# Patient Record
Sex: FEMALE | Race: WHITE | NOT HISPANIC OR LATINO | Employment: FULL TIME | URBAN - METROPOLITAN AREA
[De-identification: names, ages, dates, MRNs, and addresses within clinical notes are randomized per-mention and may not be internally consistent; named-entity substitution may affect disease eponyms.]

---

## 2017-05-22 DIAGNOSIS — E89.0 HYPOTHYROIDISM, POSTSURGICAL: ICD-10-CM

## 2017-05-22 NOTE — TELEPHONE ENCOUNTER
levothyroxine (SYNTHROID) 150 MCG tablet     Last Written Prescription Date: 4/1/2016  Last Quantity: 90, # refills: 3  Last Office Visit with [facility], [facility] or [facility] prescribing provider: 4/1/2016        TSH   Date Value Ref Range Status   02/18/2016 0.89 0.40 - 4.00 mU/L Final

## 2017-05-23 RX ORDER — LEVOTHYROXINE SODIUM 150 UG/1
TABLET ORAL
Qty: 30 TABLET | Refills: 0 | Status: SHIPPED | OUTPATIENT
Start: 2017-05-23 | End: 2017-06-22

## 2017-05-23 NOTE — TELEPHONE ENCOUNTER
Medication is being filled for 1 time refill only due to:  Patient needs labs TSH. Patient needs to be seen because it has been more than one year since last visit.     Lashell Lozano RN

## 2017-06-22 ENCOUNTER — OFFICE VISIT (OUTPATIENT)
Dept: PEDIATRICS | Facility: CLINIC | Age: 42
End: 2017-06-22
Payer: COMMERCIAL

## 2017-06-22 VITALS
HEART RATE: 66 BPM | OXYGEN SATURATION: 99 % | TEMPERATURE: 98.1 F | BODY MASS INDEX: 36.56 KG/M2 | HEIGHT: 66 IN | DIASTOLIC BLOOD PRESSURE: 68 MMHG | SYSTOLIC BLOOD PRESSURE: 102 MMHG | WEIGHT: 227.5 LBS

## 2017-06-22 DIAGNOSIS — Z01.419 ENCOUNTER FOR GYNECOLOGICAL EXAMINATION WITHOUT ABNORMAL FINDING: Primary | ICD-10-CM

## 2017-06-22 DIAGNOSIS — Z11.3 SCREEN FOR STD (SEXUALLY TRANSMITTED DISEASE): ICD-10-CM

## 2017-06-22 DIAGNOSIS — Z12.31 ENCOUNTER FOR SCREENING MAMMOGRAM FOR BREAST CANCER: ICD-10-CM

## 2017-06-22 DIAGNOSIS — E55.9 VITAMIN D DEFICIENCY: ICD-10-CM

## 2017-06-22 DIAGNOSIS — Z83.3 FAMILY HISTORY OF DIABETES MELLITUS: ICD-10-CM

## 2017-06-22 DIAGNOSIS — E89.0 HYPOTHYROIDISM, POSTSURGICAL: ICD-10-CM

## 2017-06-22 DIAGNOSIS — M54.50 CHRONIC BILATERAL LOW BACK PAIN WITHOUT SCIATICA: ICD-10-CM

## 2017-06-22 DIAGNOSIS — G47.00 INSOMNIA, UNSPECIFIED TYPE: ICD-10-CM

## 2017-06-22 DIAGNOSIS — G89.29 CHRONIC BILATERAL LOW BACK PAIN WITHOUT SCIATICA: ICD-10-CM

## 2017-06-22 LAB
HBA1C MFR BLD: 5 % (ref 4.3–6)
T4 FREE SERPL-MCNC: 1.62 NG/DL (ref 0.76–1.46)
TSH SERPL DL<=0.05 MIU/L-ACNC: 0.14 MU/L (ref 0.4–4)

## 2017-06-22 PROCEDURE — 82306 VITAMIN D 25 HYDROXY: CPT | Performed by: INTERNAL MEDICINE

## 2017-06-22 PROCEDURE — 84443 ASSAY THYROID STIM HORMONE: CPT | Performed by: INTERNAL MEDICINE

## 2017-06-22 PROCEDURE — 87491 CHLMYD TRACH DNA AMP PROBE: CPT | Performed by: INTERNAL MEDICINE

## 2017-06-22 PROCEDURE — 36415 COLL VENOUS BLD VENIPUNCTURE: CPT | Performed by: INTERNAL MEDICINE

## 2017-06-22 PROCEDURE — 99396 PREV VISIT EST AGE 40-64: CPT | Performed by: INTERNAL MEDICINE

## 2017-06-22 PROCEDURE — G0145 SCR C/V CYTO,THINLAYER,RESCR: HCPCS | Performed by: INTERNAL MEDICINE

## 2017-06-22 PROCEDURE — 83036 HEMOGLOBIN GLYCOSYLATED A1C: CPT | Performed by: INTERNAL MEDICINE

## 2017-06-22 PROCEDURE — 87591 N.GONORRHOEAE DNA AMP PROB: CPT | Performed by: INTERNAL MEDICINE

## 2017-06-22 PROCEDURE — 84439 ASSAY OF FREE THYROXINE: CPT | Performed by: INTERNAL MEDICINE

## 2017-06-22 PROCEDURE — 87624 HPV HI-RISK TYP POOLED RSLT: CPT | Performed by: INTERNAL MEDICINE

## 2017-06-22 PROCEDURE — 87389 HIV-1 AG W/HIV-1&-2 AB AG IA: CPT | Performed by: INTERNAL MEDICINE

## 2017-06-22 PROCEDURE — 86780 TREPONEMA PALLIDUM: CPT | Performed by: INTERNAL MEDICINE

## 2017-06-22 RX ORDER — LEVOTHYROXINE SODIUM 150 UG/1
150 TABLET ORAL DAILY
Qty: 30 TABLET | Refills: 0 | Status: SHIPPED | OUTPATIENT
Start: 2017-06-22 | End: 2017-06-23

## 2017-06-22 RX ORDER — TRAZODONE HYDROCHLORIDE 100 MG/1
100 TABLET ORAL
Qty: 30 TABLET | Refills: 3 | Status: SHIPPED | OUTPATIENT
Start: 2017-06-22 | End: 2018-09-07

## 2017-06-22 NOTE — NURSING NOTE
"Chief Complaint   Patient presents with     Physical       Initial /68 (BP Location: Right arm, Patient Position: Chair, Cuff Size: Adult Large)  Pulse 66  Temp 98.1  F (36.7  C) (Tympanic)  Ht 5' 5.5\" (1.664 m)  Wt 227 lb 8 oz (103.2 kg)  LMP 05/30/2017  SpO2 99%  BMI 37.28 kg/m2 Estimated body mass index is 37.28 kg/(m^2) as calculated from the following:    Height as of this encounter: 5' 5.5\" (1.664 m).    Weight as of this encounter: 227 lb 8 oz (103.2 kg).  Medication Reconciliation: complete   Luzmaria Paniagua CMA    "

## 2017-06-22 NOTE — PROGRESS NOTES
SUBJECTIVE:     CC: Kaleigh Nam is an 42 year old woman who presents for preventive health visit.     Physical   Annual:     Getting at least 3 servings of Calcium per day::  Yes    Bi-annual eye exam::  Yes    Dental care twice a year::  Yes    Sleep apnea or symptoms of sleep apnea::  None    Diet::  Regular (no restrictions)    Frequency of exercise::  4-5 days/week    Duration of exercise::  45-60 minutes    Taking medications regularly::  Yes    Medication side effects::  Not applicable    Additional concerns today::  YES (spotting after period, back stiffness,STD)    1) back stiffness, worse with getting out of bed in the am,  Has a hard time standing upright.  Has increased in past 6 months.  No injury.  Is working on core strengthening. No radiation of pan down legs, no change in bowel or bladder.    2)  had an affair, wants STD testing.   3) having some spotting intermittently between periods.  Spotting lasts 1-2 days.      Hypothyroidism Follow-up      Since last visit, patient describes the following symptoms: Weight stable, no hair loss, no skin changes, no constipation, no loose stools       Today's PHQ-2 Score:   PHQ-2 ( 1999 Pfizer) 6/22/2017   Q1: Little interest or pleasure in doing things 0   Q2: Feeling down, depressed or hopeless 0   PHQ-2 Score 0   Q1: Little interest or pleasure in doing things Not at all   Q2: Feeling down, depressed or hopeless Not at all   PHQ-2 Score 0       Abuse: Current or Past(Physical, Sexual or Emotional)- NO  Do you feel safe in your environment - YES    Social History   Substance Use Topics     Smoking status: Never Smoker     Smokeless tobacco: Never Used     Alcohol use 1.8 oz/week     3 Standard drinks or equivalent per week      Comment: 1 month     The patient does not drink >3 drinks per day nor >7 drinks per week.    Recent Labs   Lab Test  02/18/16   0954 05/31/12   CHOL  153  172   HDL  74  63   LDL  69  100   TRIG  52  44   NHDL  79   --         Reviewed orders with patient.  Reviewed health maintenance and updated orders accordingly - Yes    Mammo Decision Support:  Patient under age 50, mutual decision reflected in health maintenance.      Pertinent mammograms are reviewed under the imaging tab.  History of abnormal Pap smear: NO - age 30-65 PAP every 5 years with negative HPV co-testing recommended    Reviewed and updated as needed this visit by clinical staff  Tobacco  Allergies  Meds  Med Hx  Surg Hx  Fam Hx  Soc Hx        Reviewed and updated as needed this visit by Provider        Past Medical History:   Diagnosis Date     Allergic rhinitis, cause unspecified      Anxiety state, unspecified      Graves's Disease      Multinodular Goiter      Severe pre-eclampsia, unspecified as to episode of care       Past Surgical History:   Procedure Laterality Date     C  DELIVERY ONLY      , Low Cervical     C  DELIVERY ONLY      , Low Cervical     C  DELIVERY ONLY      , Low Cervical     C THYROIDECTOMY         ROS:  C: NEGATIVE for fever, chills, change in weight  I: NEGATIVE for worrisome rashes, moles or lesions  E: NEGATIVE for vision changes or irritation  ENT: NEGATIVE for ear, mouth and throat problems  R: NEGATIVE for significant cough or SOB  B: NEGATIVE for masses, tenderness or discharge  CV: NEGATIVE for chest pain, palpitations or peripheral edema  GI: NEGATIVE for nausea, abdominal pain, heartburn, or change in bowel habits  : NEGATIVE for unusual urinary or vaginal symptoms. Periods are regular.  M: NEGATIVE for significant arthralgias or myalgia  N: NEGATIVE for weakness, dizziness or paresthesias  P: NEGATIVE for changes in mood or affect    Problem list, Medication list, Allergies, and Medical/Social/Surgical histories reviewed in [facility] and updated as appropriate.  OBJECTIVE:     /68 (BP Location: Right arm, Patient Position: Chair, Cuff Size: Adult Large)  Pulse 66  " Temp 98.1  F (36.7  C) (Tympanic)  Ht 5' 5.5\" (1.664 m)  Wt 227 lb 8 oz (103.2 kg)  LMP 05/30/2017  SpO2 99%  BMI 37.28 kg/m2  EXAM:  GENERAL: healthy, alert and no distress  EYES: Eyes grossly normal to inspection, PERRL and conjunctivae and sclerae normal  HENT: ear canals and TM's normal, nose and mouth without ulcers or lesions  NECK: no adenopathy, no asymmetry, masses, or scars and thyroid normal to palpation  RESP: lungs clear to auscultation - no rales, rhonchi or wheezes  BREAST: normal without masses, tenderness or nipple discharge and no palpable axillary masses or adenopathy  CV: regular rate and rhythm, normal S1 S2, no S3 or S4, no murmur, click or rub, no peripheral edema and peripheral pulses strong  ABDOMEN: soft, nontender, no hepatosplenomegaly, no masses and bowel sounds normal   (female): normal female external genitalia, normal urethral meatus, vaginal mucosa pink, moist, well rugated, and normal cervix/adnexa/uterus without masses or discharge  MS: no gross musculoskeletal defects noted, no edema  SKIN: no suspicious lesions or rashes  NEURO: Normal strength and tone, mentation intact and speech normal  PSYCH: mentation appears normal, affect normal/bright    ASSESSMENT/PLAN:     (Z01.419) Encounter for gynecological examination without abnormal finding  (primary encounter diagnosis)  -pap today  -mammo due  -imm utd  Plan: Pap imaged thin layer screen with HPV -         recommended age 30 - 65 years (select HPV order        below), HPV High Risk Types DNA Cervical        (M54.5,  G89.29) Chronic bilateral low back pain without sciatica  -pain is muscular by history  -will have her start PT to work on stretching and strengthening  -if worsening or not improving would have her see ortho  Plan: [facility] PT, HAND, AND CHIROPRACTIC REFERRAL      (E89.0) Hypothyroidism, postsurgical  -due for labs  Plan: TSH, T4 FREE, levothyroxine         (SYNTHROID/LEVOTHROID) 150 MCG tablet         (G47.00) " "Insomnia, unspecified type  -doing well on trazodone despite multiple social stressors  Plan: traZODone (DESYREL) 100 MG tablet        (E55.9) Vitamin D deficiency  Plan: Vitamin D Deficiency         (Z83.3) Family history of diabetes mellitus  Plan: Hemoglobin A1c          (Z12.31) Encounter for screening mammogram for breast cancer  Plan: *MA Screening Digital Bilateral         (Z11.3) Screen for STD (sexually transmitted disease)  Plan: Neisseria gonorrhoeae PCR, Chlamydia         trachomatis PCR, HIV Antigen Antibody Combo,         Anti Treponema        COUNSELING:  Reviewed preventive health counseling, as reflected in patient instructions       Regular exercise       Healthy diet/nutrition         reports that she has never smoked. She has never used smokeless tobacco.    Estimated body mass index is 37.28 kg/(m^2) as calculated from the following:    Height as of this encounter: 5' 5.5\" (1.664 m).    Weight as of this encounter: 227 lb 8 oz (103.2 kg).   Weight management plan: pt doing weight watchers currently     Counseling Resources:  ATP IV Guidelines  Pooled Cohorts Equation Calculator  Breast Cancer Risk Calculator  FRAX Risk Assessment  ICSI Preventive Guidelines  Dietary Guidelines for Americans, 2010  USDA's MyPlate  ASA Prophylaxis  Lung CA Screening    Anai Dorantes MD  [facility] [place]  "

## 2017-06-22 NOTE — MR AVS SNAPSHOT
After Visit Summary   6/22/2017    Kaleigh Nam    MRN: [id number]           Patient Information     Date Of Birth          1975        Visit Information        Provider Department      6/22/2017 8:40 AM Anai Dorantes MD [facility]        Today's Diagnoses     Encounter for gynecological examination without abnormal finding    -  1    Chronic bilateral low back pain without sciatica        Hypothyroidism, postsurgical        Insomnia, unspecified type        Vitamin D deficiency        Family history of diabetes mellitus        Encounter for screening mammogram for breast cancer        Screen for STD (sexually transmitted disease)          Care Instructions      Preventive Health Recommendations  Female Ages 40 to 49    Yearly exam:     See your health care provider every year in order to  1. Review health changes.   2. Discuss preventive care.    3. Review your medicines if your doctor prescribed any.      Get a Pap test every three years (unless you have an abnormal result and your provider advises testing more often).      If you get Pap tests with HPV test, you only need to test every 5 years, unless you have an abnormal result. You do not need a Pap test if your uterus was removed (hysterectomy) and you have not had cancer.      You should be tested each year for STDs (sexually transmitted diseases), if you're at risk.       Ask your doctor if you should have a mammogram.      Have a colonoscopy (test for colon cancer) if someone in your family has had colon cancer or polyps before age 50.       Have a cholesterol test every 5 years.       Have a diabetes test (fasting glucose) after age 45. If you are at risk for diabetes, you should have this test every 3 years.    Shots: Get a flu shot each year. Get a tetanus shot every 10 years.     Nutrition:     Eat at least 5 servings of fruits and vegetables each day.    Eat whole-grain bread, whole-wheat pasta and brown  rice instead of white grains and rice.    Talk to your provider about Calcium and Vitamin D.     Lifestyle    Exercise at least 150 minutes a week (an average of 30 minutes a day, 5 days a week). This will help you control your weight and prevent disease.    Limit alcohol to one drink per day.    No smoking.     Wear sunscreen to prevent skin cancer.    See your dentist every six months for an exam and cleaning.          Follow-ups after your visit        Additional Services     [facility] PT, HAND, AND CHIROPRACTIC REFERRAL       **This order will print in the [facility] Scheduling Office**    Physical Therapy, Hand Therapy and Chiropractic Care are available through:    *[facility]  *[facility]  *[facility]    Call one number to schedule at any of the above locations: [phone].    Your provider has referred you to: Physical Therapy at [facility] or [facility]    Indication/Reason for Referral: Low Back Pain  Onset of Illness: 6 months   Therapy Orders: Evaluate and Treat  Special Programs: None  Special Request:     Kristen Peterson      Additional Comments for the Therapist or Chiropractor:     Please be aware that coverage of these services is subject to the terms and limitations of your health insurance plan.  Call member services at your health plan with any benefit or coverage questions.      Please bring the following to your appointment:    *Your personal calendar for scheduling future appointments  *Comfortable clothing                  Future tests that were ordered for you today     Open Future Orders        Priority Expected Expires Ordered    *MA Screening Digital Bilateral Routine  6/20/2018 6/22/2017            Who to contact     If you have questions or need follow up information about today's clinic visit or your schedule please contact [facility] [place] directly at [phone].  Normal or non-critical lab and imaging results will be communicated to you by  "MyChart, letter or phone within 4 business days after the clinic has received the results. If you do not hear from us within 7 days, please contact the clinic through [employer] or phone. If you have a critical or abnormal lab result, we will notify you by phone as soon as possible.  Submit refill requests through [employer] or call your pharmacy and they will forward the refill request to us. Please allow 3 business days for your refill to be completed.          Additional Information About Your Visit        [employer] Information     [employer] gives you secure access to your electronic health record. If you see a primary care provider, you can also send messages to your care team and make appointments. If you have questions, please call your primary care clinic.  If you do not have a primary care provider, please call [phone] and they will assist you.        Care EveryWhere ID     This is your Care EveryWhere ID. This could be used by other organizations to access your [facility] medical records  [id number]        Your Vitals Were     Pulse Temperature Height Last Period Pulse Oximetry BMI (Body Mass Index)    66 98.1  F (36.7  C) (Tympanic) 5' 5.5\" (1.664 m) 05/30/2017 99% 37.28 kg/m2       Blood Pressure from Last 3 Encounters:   06/22/17 102/68   04/01/16 110/70   02/18/16 102/68    Weight from Last 3 Encounters:   06/22/17 227 lb 8 oz (103.2 kg)   04/01/16 232 lb 5 oz (105.4 kg)   02/18/16 230 lb 1.6 oz (104.4 kg)              We Performed the Following     Anti Treponema     Chlamydia trachomatis PCR     Hemoglobin A1c     HIV Antigen Antibody Combo     HPV High Risk Types DNA Cervical     [facility] PT, HAND, AND CHIROPRACTIC REFERRAL     Neisseria gonorrhoeae PCR     Pap imaged thin layer screen with HPV - recommended age 30 - 65 years (select HPV order below)     T4 FREE     TSH     Vitamin D Deficiency          Today's Medication Changes          These changes are accurate as of: 6/22/17  9:23 AM.  If you have any " questions, ask your nurse or doctor.               These medicines have changed or have updated prescriptions.        Dose/Directions    levothyroxine 150 MCG tablet   Commonly known as:  SYNTHROID/LEVOTHROID   This may have changed:  See the new instructions.   Used for:  Hypothyroidism, postsurgical   Changed by:  Anai Dorantes MD        Dose:  150 mcg   Take 1 tablet (150 mcg) by mouth daily   Quantity:  30 tablet   Refills:  0            Where to get your medicines      These medications were sent to [facility] - [address]   [address], [place] [address]     Phone:  [phone]     levothyroxine 150 MCG tablet    traZODone 100 MG tablet                Primary Care Provider Office Phone # Fax #    Anai Dorantes -[phone]       [facility] [address] [place] [address]        Equal Access to Services     [facility]: Hadii aad ku hadasho Soomaali, waaxda luqadaha, qaybta kaalmada adeegyada, waxay idiin hayaan lyudmila lela fernandez . So [facility] [phone].    ATENCIÓN: Si habla español, tiene a nina disposición servicios gratuitos de asistencia lingüística. [facility] [phone].    We comply with applicable federal civil rights laws and [place] laws. We do not discriminate on the basis of race, color, national origin, age, disability sex, sexual orientation or gender identity.            Thank you!     Thank you for choosing [facility]  for your care. Our goal is always to provide you with excellent care. Hearing back from our patients is one way we can continue to improve our services. Please take a few minutes to complete the written survey that you may receive in the mail after your visit with us. Thank you!             Your Updated Medication List - Protect others around you: Learn how to safely use, store and throw away your medicines at www.disposemymeds.org.           This list is accurate as of: 6/22/17  9:23 AM.  Always use your most recent med list.                   Brand Name Dispense Instructions for use Diagnosis    ALPRAZolam 0.25 MG tablet    XANAX    10 tablet    Take 1 tablet (0.25 mg) by mouth 3 times daily as needed for anxiety    Adjustment disorder with mixed emotional features       CLARITIN 10 MG tablet   Generic drug:  loratadine      Take 10 mg by mouth daily as needed.        levothyroxine 150 MCG tablet    SYNTHROID/LEVOTHROID    30 tablet    Take 1 tablet (150 mcg) by mouth daily    Hypothyroidism, postsurgical       traZODone 100 MG tablet    DESYREL    30 tablet    Take 1 tablet (100 mg) by mouth nightly as needed for sleep    Insomnia, unspecified type

## 2017-06-23 LAB
C TRACH DNA SPEC QL NAA+PROBE: NORMAL
DEPRECATED CALCIDIOL+CALCIFEROL SERPL-MC: 20 UG/L (ref 20–75)
HIV 1+2 AB+HIV1 P24 AG SERPL QL IA: NORMAL
N GONORRHOEA DNA SPEC QL NAA+PROBE: NORMAL
SPECIMEN SOURCE: NORMAL
SPECIMEN SOURCE: NORMAL
T PALLIDUM IGG+IGM SER QL: NEGATIVE

## 2017-06-23 RX ORDER — LEVOTHYROXINE SODIUM 137 UG/1
137 TABLET ORAL DAILY
Qty: 30 TABLET | Refills: 1 | Status: SHIPPED | OUTPATIENT
Start: 2017-06-23 | End: 2017-09-10

## 2017-06-27 ENCOUNTER — THERAPY VISIT (OUTPATIENT)
Dept: PHYSICAL THERAPY | Facility: CLINIC | Age: 42
End: 2017-06-27
Payer: COMMERCIAL

## 2017-06-27 DIAGNOSIS — M54.50 LUMBAGO: Primary | ICD-10-CM

## 2017-06-27 LAB
COPATH REPORT: NORMAL
PAP: NORMAL

## 2017-06-27 PROCEDURE — 97110 THERAPEUTIC EXERCISES: CPT | Mod: GP | Performed by: PHYSICAL THERAPIST

## 2017-06-27 PROCEDURE — 97161 PT EVAL LOW COMPLEX 20 MIN: CPT | Mod: GP | Performed by: PHYSICAL THERAPIST

## 2017-06-27 NOTE — MR AVS SNAPSHOT
After Visit Summary   6/27/2017    Kaleigh Nam    MRN: [id number]           Patient Information     Date Of Birth          1975        Visit Information        Provider Department      6/27/2017 12:40 PM Michael Panchal PT [facility] [place]        Today's Diagnoses     Lumbago    -  1       Follow-ups after your visit        Your next 10 appointments already scheduled     Jul 03, 2017  1:20 PM CDT   [facility] Spine with Michael Panchal PT   [facility] [place] ([facility] [place]  )    [address] [place] [address]  [address]  [facility] [address]   [phone]            Jul 10, 2017  1:20 PM CDT   [facility] Spine with Michael Panchal PT   [facility] [place] ([facility] [place]  )    [address] [place] [address]  [address]  [facility] [address]   [phone]              Who to contact     If you have questions or need follow up information about today's clinic visit or your schedule please contact [facility] [place] directly at [phone].  Normal or non-critical lab and imaging results will be communicated to you by MyChart, letter or phone within 4 business days after the clinic has received the results. If you do not hear from us within 7 days, please contact the clinic through [employer] or phone. If you have a critical or abnormal lab result, we will notify you by phone as soon as possible.  Submit refill requests through [employer] or call your pharmacy and they will forward the refill request to us. Please allow 3 business days for your refill to be completed.          Additional Information About Your Visit        MyChart Information     [employer] gives you secure access to your electronic health record. If you see a primary care provider, you can also send messages to your care team and make appointments. If you have questions, please call your primary care clinic.  If you do not have a primary care provider, please call [phone] and they will  assist you.        Care EveryWhere ID     This is your Care EveryWhere ID. This could be used by other organizations to access your [facility] medical records  [id number]        Your Vitals Were     Last Period                   05/30/2017            Blood Pressure from Last 3 Encounters:   06/22/17 102/68   04/01/16 110/70   02/18/16 102/68    Weight from Last 3 Encounters:   06/22/17 103.2 kg (227 lb 8 oz)   04/01/16 105.4 kg (232 lb 5 oz)   02/18/16 104.4 kg (230 lb 1.6 oz)              We Performed the Following     HC PT EVAL, LOW COMPLEXITY     [facility] INITIAL EVAL REPORT     THERAPEUTIC EXERCISES        Primary Care Provider Office Phone # Fax #    Anai Dorantes [phone]       [facility] [address] [place] [address]        Equal Access to Services     [facility]: Hadii aad ku hadasho Soomaali, waaxda luqadaha, qaybta kaalmada adeegyada, waxay idiin haydarlinn christal fernandez . So [facility] [phone].    ATENCIÓN: Si habla español, tiene a nina disposición servicios gratuitos de asistencia lingüística. Llame al [phone].    We comply with applicable federal civil rights laws and [place] laws. We do not discriminate on the basis of race, color, national origin, age, disability sex, sexual orientation or gender identity.            Thank you!     Thank you for choosing [facility]  for your care. Our goal is always to provide you with excellent care. Hearing back from our patients is one way we can continue to improve our services. Please take a few minutes to complete the written survey that you may receive in the mail after your visit with us. Thank you!             Your Updated Medication List - Protect others around you: Learn how to safely use, store and throw away your medicines at www.disposemymeds.org.          This list is accurate as of: 6/27/17  1:49 PM.  Always use your most recent med list.                   Brand  Name Dispense Instructions for use Diagnosis    ALPRAZolam 0.25 MG tablet    XANAX    10 tablet    Take 1 tablet (0.25 mg) by mouth 3 times daily as needed for anxiety    Adjustment disorder with mixed emotional features       cholecalciferol 67488 UNITS capsule    VITAMIN D3    8 capsule    Take 1 capsule (50,000 Units) by mouth once a week    Vitamin D deficiency       CLARITIN 10 MG tablet   Generic drug:  loratadine      Take 10 mg by mouth daily as needed.        levothyroxine 137 MCG tablet    SYNTHROID/LEVOTHROID    30 tablet    Take 1 tablet (137 mcg) by mouth daily    Hypothyroidism, postsurgical       traZODone 100 MG tablet    DESYREL    30 tablet    Take 1 tablet (100 mg) by mouth nightly as needed for sleep    Insomnia, unspecified type

## 2017-06-27 NOTE — PROGRESS NOTES
Subjective:    Patient is a 42 year old female presenting with rehab back hpi.   Kaleigh Nam is a 42 year old female with a lumbar condition.      This is a new condition  Patient reports that started to have low back pain about 1/1/17.  Hard time getting straight up in the morning, and gets stiff often.  3-4 episodes in adult life.  All self limited.   Dad had similar problems when she was younger.  Likes to do yoga.  Gets really stiff the next day.   Pain is in central low back (been a little more right lately).  Worse: sitting, bending, yoga, working out, rolling over in bed, bad chairs  Better:  Sitting supported, some movement (walking).    Walking is ok. .        Pain is described as aching and is intermittent and reported as 3/10.        Since onset symptoms are unchanged.        General health as reported by patient is excellent.  Pertinent medical history includes:  Overweight and thyroid problems.  Medical allergies: no.  Other surgeries include:  Other and orthopedic surgery.  Current medications:  Thyroid medication.  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  Patient is working in normal job without restrictions.  Primary job tasks include:  Prolonged sitting.    Barriers include:  None as reported by the patient.    Red flags:  None as reported by the patient.                        Objective:    System         Lumbar/SI Evaluation  ROM:    AROM Lumbar:   Flexion:          Nil restriction  Ext:                    Mod restriction   Side Bend:        Left:     Right:   Rotation:           Left:     Right:   Side Glide:        Left:  Nil    Right:  Nil        Strength: Glute medius 4-/5, glute max 4-/5.   Lumbar Myotomes:  normal            Lumbar DTR's:  not assessed      Cord Signs:  normal    Lumbar Dermtomes:  normal                        Spinal Segmental Conclusions:     Level: noted at L2, L3, L4, L5 and S1                                                     Consuelo Lumbar  Evaluation    Posture:  Sitting: good        Correction of Posture: better      Test Movements:        EIL: During: produces  After: no worse  Mechanical Response: no effect  Repeat EIL: During: produces  After: no worse  Mechanical Response: no effect                                                 ROS    Assessment/Plan:      Patient is a 42 year old female with lumbar complaints.    Patient has the following significant findings with corresponding treatment plan.                Diagnosis 1:  Lumbar Pain   Pain -  hot/cold therapy, US, manual therapy, self management, education, directional preference exercise and home program  Decreased ROM/flexibility - manual therapy, therapeutic exercise and home program  Decreased joint mobility - manual therapy, therapeutic exercise and home program  Decreased strength - therapeutic exercise, therapeutic activities and home program  Impaired muscle performance - neuro re-education and home program  Decreased function - therapeutic activities and home program    Therapy Evaluation Codes:   1) History comprised of:   Personal factors that impact the plan of care:      Time since onset of symptoms.    Comorbidity factors that impact the plan of care are:      None.     Medications impacting care: None.  2) Examination of Body Systems comprised of:   Body structures and functions that impact the plan of care:      Lumbar spine.   Activity limitations that impact the plan of care are:      Bending, Sitting and Sleeping.  3) Clinical presentation characteristics are:   Stable/Uncomplicated.  4) Decision-Making    Low complexity using standardized patient assessment instrument and/or measureable assessment of functional outcome.  Cumulative Therapy Evaluation is: Low complexity.    Previous and current functional limitations:  (See Goal Flow Sheet for this information)    Short term and Long term goals: (See Goal Flow Sheet for this information)     Communication ability:  Patient  appears to be able to clearly communicate and understand verbal and written communication and follow directions correctly.  Treatment Explanation - The following has been discussed with the patient:   RX ordered/plan of care  Anticipated outcomes  Possible risks and side effects  This patient would benefit from PT intervention to resume normal activities.   Rehab potential is excellent.    Frequency:  1 X week, once daily  Duration:  for 5 weeks  Discharge Plan:  Achieve all LTG.  Independent in home treatment program.  Reach maximal therapeutic benefit.    Please refer to the daily flowsheet for treatment today, total treatment time and time spent performing 1:1 timed codes.

## 2017-06-29 LAB
FINAL DIAGNOSIS: NORMAL
HPV HR 12 DNA CVX QL NAA+PROBE: NEGATIVE
HPV16 DNA SPEC QL NAA+PROBE: NEGATIVE
HPV18 DNA SPEC QL NAA+PROBE: NEGATIVE
SPECIMEN DESCRIPTION: NORMAL

## 2017-07-03 ENCOUNTER — THERAPY VISIT (OUTPATIENT)
Dept: PHYSICAL THERAPY | Facility: CLINIC | Age: 42
End: 2017-07-03
Payer: COMMERCIAL

## 2017-07-03 DIAGNOSIS — M54.50 LUMBAGO: ICD-10-CM

## 2017-07-03 PROCEDURE — 97112 NEUROMUSCULAR REEDUCATION: CPT | Mod: GP | Performed by: PHYSICAL THERAPIST

## 2017-07-03 PROCEDURE — 97110 THERAPEUTIC EXERCISES: CPT | Mod: GP | Performed by: PHYSICAL THERAPIST

## 2017-07-17 ENCOUNTER — THERAPY VISIT (OUTPATIENT)
Dept: PHYSICAL THERAPY | Facility: CLINIC | Age: 42
End: 2017-07-17
Payer: COMMERCIAL

## 2017-07-17 DIAGNOSIS — M54.50 LUMBAGO: ICD-10-CM

## 2017-07-17 PROCEDURE — 97110 THERAPEUTIC EXERCISES: CPT | Mod: GP | Performed by: PHYSICAL THERAPIST

## 2017-07-17 PROCEDURE — 97112 NEUROMUSCULAR REEDUCATION: CPT | Mod: GP | Performed by: PHYSICAL THERAPIST

## 2017-07-17 NOTE — MR AVS SNAPSHOT
After Visit Summary   7/17/2017    Kaleigh Nam    MRN: [id number]           Patient Information     Date Of Birth          1975        Visit Information        Provider Department      7/17/2017 2:40 PM Michael Panchal PT [facility]        Today's Diagnoses     Lumbago           Follow-ups after your visit        Who to contact     If you have questions or need follow up information about today's clinic visit or your schedule please contact [facility] directly at [phone].  Normal or non-critical lab and imaging results will be communicated to you by BleepBleepshart, letter or phone within 4 business days after the clinic has received the results. If you do not hear from us within 7 days, please contact the clinic through [employer] or phone. If you have a critical or abnormal lab result, we will notify you by phone as soon as possible.  Submit refill requests through [employer] or call your pharmacy and they will forward the refill request to us. Please allow 3 business days for your refill to be completed.          Additional Information About Your Visit        MyChart Information     [employer] gives you secure access to your electronic health record. If you see a primary care provider, you can also send messages to your care team and make appointments. If you have questions, please call your primary care clinic.  If you do not have a primary care provider, please call [phone] and they will assist you.        Care EveryWhere ID     This is your Care EveryWhere ID. This could be used by other organizations to access your [facility] medical records  [id number]        Your Vitals Were     Last Period                   05/30/2017            Blood Pressure from Last 3 Encounters:   06/22/17 102/68   04/01/16 110/70   02/18/16 102/68    Weight from Last 3 Encounters:   06/22/17 103.2 kg (227 lb 8 oz)   04/01/16 105.4 kg (232 lb 5 oz)   02/18/16  104.4 kg (230 lb 1.6 oz)              We Performed the Following     NEUROMUSCULAR RE-EDUCATION     THERAPEUTIC EXERCISES        Primary Care Provider Office Phone # Fax #    Anai Dorantes [phone]       [facility] [address] [place] [address] [address] [place] [address]        Equal Access to Services     [facility]: Hadii aad ku hadasho Soomaali, waaxda luqadaha, qaybta kaalmada adeegyada, waxay amandeepin rodgern lyudmilamichelle vogel larosalinamandie . So [facility] [phone].    ATENCIÓN: Si habla español, tiene a nina disposición servicios gratuitos de asistencia lingüística. [facility] [phone].    We comply with applicable federal civil rights laws and [place] laws. We do not discriminate on the basis of race, color, national origin, age, disability sex, sexual orientation or gender identity.            Thank you!     Thank you for choosing [facility]  for your care. Our goal is always to provide you with excellent care. Hearing back from our patients is one way we can continue to improve our services. Please take a few minutes to complete the written survey that you may receive in the mail after your visit with us. Thank you!             Your Updated Medication List - Protect others around you: Learn how to safely use, store and throw away your medicines at www.disposemymeds.org.          This list is accurate as of: 7/17/17  3:14 PM.  Always use your most recent med list.                   Brand Name Dispense Instructions for use Diagnosis    ALPRAZolam 0.25 MG tablet    XANAX    10 tablet    Take 1 tablet (0.25 mg) by mouth 3 times daily as needed for anxiety    Adjustment disorder with mixed emotional features       cholecalciferol 86448 UNITS capsule    VITAMIN D3    8 capsule    Take 1 capsule (50,000 Units) by mouth once a week    Vitamin D deficiency       CLARITIN 10 MG tablet   Generic drug:  loratadine      Take 10 mg by mouth daily as needed.         levothyroxine 137 MCG tablet    SYNTHROID/LEVOTHROID    30 tablet    Take 1 tablet (137 mcg) by mouth daily    Hypothyroidism, postsurgical       traZODone 100 MG tablet    DESYREL    30 tablet    Take 1 tablet (100 mg) by mouth nightly as needed for sleep    Insomnia, unspecified type

## 2017-09-06 DIAGNOSIS — E89.0 HYPOTHYROIDISM, POSTSURGICAL: ICD-10-CM

## 2017-09-06 DIAGNOSIS — E55.9 VITAMIN D DEFICIENCY: ICD-10-CM

## 2017-09-06 LAB
T4 FREE SERPL-MCNC: 1.34 NG/DL (ref 0.76–1.46)
TSH SERPL DL<=0.005 MIU/L-ACNC: 1.48 MU/L (ref 0.4–4)

## 2017-09-06 PROCEDURE — 84443 ASSAY THYROID STIM HORMONE: CPT | Performed by: INTERNAL MEDICINE

## 2017-09-06 PROCEDURE — 82306 VITAMIN D 25 HYDROXY: CPT | Performed by: INTERNAL MEDICINE

## 2017-09-06 PROCEDURE — 36415 COLL VENOUS BLD VENIPUNCTURE: CPT | Performed by: INTERNAL MEDICINE

## 2017-09-06 PROCEDURE — 84439 ASSAY OF FREE THYROXINE: CPT | Performed by: INTERNAL MEDICINE

## 2017-09-07 LAB — DEPRECATED CALCIDIOL+CALCIFEROL SERPL-MC: 47 UG/L (ref 20–75)

## 2017-09-10 RX ORDER — LEVOTHYROXINE SODIUM 137 UG/1
137 TABLET ORAL DAILY
Qty: 90 TABLET | Refills: 3 | Status: SHIPPED | OUTPATIENT
Start: 2017-09-10 | End: 2018-09-09

## 2017-09-14 ENCOUNTER — RADIANT APPOINTMENT (OUTPATIENT)
Dept: MAMMOGRAPHY | Facility: CLINIC | Age: 42
End: 2017-09-14
Attending: INTERNAL MEDICINE
Payer: COMMERCIAL

## 2017-09-14 DIAGNOSIS — Z12.31 ENCOUNTER FOR SCREENING MAMMOGRAM FOR BREAST CANCER: ICD-10-CM

## 2017-09-14 PROCEDURE — G0202 SCR MAMMO BI INCL CAD: HCPCS | Mod: TC

## 2017-10-10 PROBLEM — M54.50 LUMBAGO: Status: RESOLVED | Noted: 2017-06-27 | Resolved: 2017-10-10

## 2017-10-10 NOTE — PROGRESS NOTES
Discharge Note    Progress reporting period is from initial eval to Jul 17, 2017.     Kaleigh failed to return for next follow up visit and current status is unknown.  Please see information below for last relevant information on current status.  Patient seen for 3 visits.  SUBJECTIVE  Subjective changes noted by patient:  Worked over the weekend and was a little sore from that.    .  Current pain level is  .     Previous pain level was  3/10.   Changes in function:  Yes (See Goal flowsheet attached for changes in current functional level)  Adverse reaction to treatment or activity: None    OBJECTIVE  Changes noted in objective findings: Glute medius 4+/5, glute max 4/5 bilaterally.  Lumbar flexion min restriction patient has delayed curve reversal in upper lumbar.      ASSESSMENT/PLAN  Diagnosis: Lumbar   DIAGP:  The encounter diagnosis was Lumbago.  Updated problem list and treatment plan:   Pain - HEP  Decreased ROM/flexibility - HEP  Decreased strength - HEP  STG/LTGs have been met or progress has been made towards goals:  Yes, please see goal flowsheet for most current information  Assessment of Progress: current status is unknown.    Last current status: Pt is progressing well   Self Management Plans:  HEP  I have re-evaluated this patient and find that the nature, scope, duration and intensity of the therapy is appropriate for the medical condition of the patient.  Kaleigh continues to require the following intervention to meet STG and LTG's:  HEP.    Recommendations:  Discharge with current home program.  Patient to follow up with MD as needed.    Please refer to the daily flowsheet for treatment today, total treatment time and time spent performing 1:1 timed codes.

## 2018-06-11 ENCOUNTER — OFFICE VISIT (OUTPATIENT)
Dept: DERMATOLOGY | Facility: CLINIC | Age: 43
End: 2018-06-11
Payer: COMMERCIAL

## 2018-06-11 VITALS — DIASTOLIC BLOOD PRESSURE: 85 MMHG | SYSTOLIC BLOOD PRESSURE: 122 MMHG | OXYGEN SATURATION: 100 % | HEART RATE: 55 BPM

## 2018-06-11 DIAGNOSIS — D22.9 MULTIPLE BENIGN NEVI: ICD-10-CM

## 2018-06-11 DIAGNOSIS — L73.8 SENILE SEBACEOUS GLAND HYPERPLASIA: ICD-10-CM

## 2018-06-11 DIAGNOSIS — D48.5 NEOPLASM OF UNCERTAIN BEHAVIOR OF SKIN: ICD-10-CM

## 2018-06-11 DIAGNOSIS — D18.01 CHERRY ANGIOMA: ICD-10-CM

## 2018-06-11 DIAGNOSIS — L71.0 DERMATITIS, PERIORAL: Primary | ICD-10-CM

## 2018-06-11 DIAGNOSIS — L81.4 LENTIGINES: ICD-10-CM

## 2018-06-11 PROCEDURE — 11100 HC BIOPSY SKIN/SUBQ/MUC MEM, SINGLE LESION: CPT | Performed by: PHYSICIAN ASSISTANT

## 2018-06-11 PROCEDURE — 99203 OFFICE O/P NEW LOW 30 MIN: CPT | Mod: 25 | Performed by: PHYSICIAN ASSISTANT

## 2018-06-11 PROCEDURE — 88305 TISSUE EXAM BY PATHOLOGIST: CPT | Mod: TC | Performed by: PHYSICIAN ASSISTANT

## 2018-06-11 RX ORDER — METRONIDAZOLE 7.5 MG/G
LOTION TOPICAL
Qty: 59 ML | Refills: 11 | Status: SHIPPED | OUTPATIENT
Start: 2018-06-11 | End: 2019-04-26

## 2018-06-11 RX ORDER — TACROLIMUS 0.3 MG/G
OINTMENT TOPICAL
Qty: 60 G | Refills: 1 | Status: SHIPPED | OUTPATIENT
Start: 2018-06-11 | End: 2019-04-26

## 2018-06-11 NOTE — PATIENT INSTRUCTIONS
Perioral dermatitis:  Apply topical protopic to affected area on face in the am x 4-6 weeks  Apply topical metronidazole to affected area on face in the pm x 4-6 weeks.                 Wound Care Instructions     FOR SUPERFICIAL WOUNDS     [facility] [phone]    [facility] [phone]          AFTER 24 HOURS YOU SHOULD REMOVE THE BANDAGE AND BEGIN DAILY DRESSING CHANGES AS FOLLOWS:     1) Remove Dressing.     2) Clean and dry the area with tap water using a Q-tip or sterile gauze pad.     3) Apply Vaseline, Aquaphor, Polysporin ointment or Bacitracin ointment over entire wound.  Do NOT use Neosporin ointment.     4) Cover the wound with a band-aid, or a sterile non-stick gauze pad and micropore paper tape      REPEAT THESE INSTRUCTIONS AT LEAST ONCE A DAY UNTIL THE WOUND HAS COMPLETELY HEALED.    It is an old wives tale that a wound heals better when it is exposed to air and allowed to dry out. The wound will heal faster with a better cosmetic result if it is kept moist with ointment and covered with a bandage.    **Do not let the wound dry out.**      Supplies Needed:      *Cotton tipped applicators (Q-tips)    *Polysporin Ointment or Bacitracin Ointment (NOT NEOSPORIN)    *Band-aids or non-stick gauze pads and micropore paper tape.      PATIENT INFORMATION:    During the healing process you will notice a number of changes. All wounds develop a small halo of redness surrounding the wound.  This means healing is occurring. Severe itching with extensive redness usually indicates sensitivity to the ointment or bandage tape used to dress the wound.  You should call our office if this develops.      Swelling  and/or discoloration around your surgical site is common, particularly when performed around the eye.    All wounds normally drain.  The larger the wound the more drainage there will be.  After 7-10 days, you will notice the wound beginning to shrink and new skin will begin to  grow.  The wound is healed when you can see skin has formed over the entire area.  A healed wound has a healthy, shiny look to the surface and is red to dark pink in color to normalize.  Wounds may take approximately 4-6 weeks to heal.  Larger wounds may take 6-8 weeks.  After the wound is healed you may discontinue dressing changes.    You may experience a sensation of tightness as your wound heals. This is normal and will gradually subside.    Your healed wound may be sensitive to temperature changes. This sensitivity improves with time, but if you re having a lot of discomfort, try to avoid temperature extremes.    Patients frequently experience itching after their wound appears to have healed because of the continue healing under the skin.  Plain Vaseline will help relieve the itching.        POSSIBLE COMPLICATIONS    BLEEDIN. Leave the bandage in place.  2. Use tightly rolled up gauze or a cloth to apply direct pressure over the bandage for 30  minutes.  3. Reapply pressure for an additional 30 minutes if necessary  4. Use additional gauze and tape to maintain pressure once the bleeding has stopped.

## 2018-06-11 NOTE — NURSING NOTE
"Initial /85  Pulse 55  SpO2 100% Estimated body mass index is 37.28 kg/(m^2) as calculated from the following:    Height as of 6/22/17: 1.664 m (5' 5.5\").    Weight as of 6/22/17: 103.2 kg (227 lb 8 oz). .  ERENDIRA Merida-BSN  [facility]  [phone]  "

## 2018-06-11 NOTE — LETTER
2018         RE: Kaleigh Nam  [address] [address]        Dear Colleague,    Thank you for referring your patient, Kaleigh Nam, to the [facility]. Please see a copy of my visit note below.    HPI:  Kaleigh Nam is a 43 year old year old female patient here today for red scaly pimples around eyes.    Duration: last few months on and off  Symptoms:  painful     Previous treatments: none     Alleviating/aggravating factors: none    Associated symptoms: none  Additional findings:new spot on left thigh x a few weeks  Patient has no other skin complaints today.  Remainder of the HPI, Meds, PMH, Allergies, FH, and SH was reviewed in chart.      Past Medical History:   Diagnosis Date     Allergic rhinitis, cause unspecified      Anxiety state, unspecified      Graves's Disease      Multinodular Goiter      Severe pre-eclampsia, unspecified as to episode of care        Past Surgical History:   Procedure Laterality Date     C  DELIVERY ONLY      , Low Cervical     C  DELIVERY ONLY      , Low Cervical     C  DELIVERY ONLY      , Low Cervical     C THYROIDECTOMY          Family History   Problem Relation Age of Onset     Thyroid Disease Mother      Other - See Comments Mother      prediabetes     OSTEOPOROSIS Mother      Hypertension Sister      DIABETES Sister      Hypertension Sister      Congenital Anomalies Son      autism, cognitive delay       Social History     Social History     Marital status:      Spouse name: N/A     Number of children: N/A     Years of education: N/A     Occupational History     Not on file.     Social History Main Topics     Smoking status: Never Smoker     Smokeless tobacco: Never Used     Alcohol use 1.8 oz/week     3 Standard drinks or equivalent per week      Comment: 1 month     Drug use: No     Sexual activity: Yes     Partners: Male     Birth control/  protection: Surgical     Other Topics Concern     Parent/Sibling W/ Cabg, Mi Or Angioplasty Before 65f 55m? No     Social History Narrative    , .  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         Outpatient Encounter Prescriptions as of 2018   Medication Sig Dispense Refill     metroNIDAZOLE 0.75 % LOTN Apply to affected area on face in the am x 4-6 weeks 59 mL 11     tacrolimus (PROTOPIC) 0.03 % ointment Apply to affected area at night x 4-6 weeks 60 g 1     ALPRAZolam (XANAX) 0.25 MG tablet Take 1 tablet (0.25 mg) by mouth 3 times daily as needed for anxiety (Patient not taking: Reported on 2018) 10 tablet 0     cholecalciferol (VITAMIN D3) 39419 UNITS capsule Take 1 capsule (50,000 Units) by mouth once a week 8 capsule 0     levothyroxine (SYNTHROID/LEVOTHROID) 137 MCG tablet Take 1 tablet (137 mcg) by mouth daily 90 tablet 3     loratadine (CLARITIN) 10 MG tablet Take 10 mg by mouth daily as needed.       traZODone (DESYREL) 100 MG tablet Take 1 tablet (100 mg) by mouth nightly as needed for sleep (Patient not taking: Reported on 2018) 30 tablet 3     No facility-administered encounter medications on file as of 2018.        Review Of Systems:  Skin: As above  Eyes: negative  Ears/Nose/Throat: negative  Respiratory: No shortness of breath, dyspnea on exertion, cough, or hemoptysis  Cardiovascular: negative  Gastrointestinal: negative  Genitourinary: negative  Musculoskeletal: negative  Neurologic: negative  Psychiatric: negative  Hematologic/Lymphatic/Immunologic: negative  Endocrine: negative      Objective:     /85  Pulse 55  SpO2 100%  Eyes: Conjunctivae/lids: Normal   ENT: Lips:  Normal  MSK: Normal  Cardiovascular: Peripheral edema none  Pulm: Breathing Normal  Neuro/Psych: Orientation: Normal; Mood/Affect: Normal, NAD, WDWN  Following areas examined:   Scalp, face, eyelids, lips, neck, chest, abdomen, back, buttocks, and R&L upper and lower  extremities.      Findings:  1)Well circumscribed macules with symmetric color distribution on trunk and extremities.  2)Flesh colored papule/s with yellow lobules and central depression on face  3) smooth white WD papules perioral  4)Tan WD smooth macules on face, neck, trunk, and extremities.  5) WD dark brown smooth macule on Left medial proximal thigh 0.15cm  6) red papules periorbital    Assessment and Plan:  1-4) benign nevi, cherry angiomas, SGH and lentigines  I discussed the specifics of tumor, prognosis, and genetics of benign lesions.  I explained that treatment of these lesions would be purely cosmetic and not medically neccessary.  I discussed with patient different removal options including excision, cryotherapy, cautery and /or laser.      5)Neoplasm of uncertain behavior on Left medial proximal thigh 0.15cm    Rule out atypical nevus  TANGENTIAL BIOPSY:  After consent, anesthesia with LEC and prep, tangential biopsy performed.  No complications and routine wound care.  May grow back and will get a scar. Based on lesion type may need to completely remove lesion. Patient will be notified in 7-10 days of results. Wound care directions given.    6)  periorbital dermatitis  Disc etiology and course.   Disc topicals and oral agents. Pt elects topicals.   Apply topical protopic to affected area on face in the am x 4-6 weeks  Apply topical metronidazole to affected area on face in the pm x 4-6 weeks.      Signs and Symptoms of non-melanoma skin cancer and ABCDEs of melanoma reviewed with patient. Patient encouraged to perform monthly self skin exams. UV precautions reviewed with patient.       Follow up in 6 weeks. Yearly for FBE      Again, thank you for allowing me to participate in the care of your patient.        Sincerely,        Altagracia Bose PA-C

## 2018-06-11 NOTE — MR AVS SNAPSHOT
After Visit Summary   6/11/2018    Kaleigh Nam    MRN: [id number]           Patient Information     Date Of Birth          1975        Visit Information        Provider Department      6/11/2018 10:40 AM Altagracia Bose PA-C [facility]        Today's Diagnoses     Dermatitis, perioral    -  1    Neoplasm of uncertain behavior of skin          Care Instructions    Perioral dermatitis:  Apply topical protopic to affected area on face in the am x 4-6 weeks  Apply topical metronidazole to affected area on face in the pm x 4-6 weeks.                 Wound Care Instructions     FOR SUPERFICIAL WOUNDS     [facility] [phone]    [facility] [phone]          AFTER 24 HOURS YOU SHOULD REMOVE THE BANDAGE AND BEGIN DAILY DRESSING CHANGES AS FOLLOWS:     1) Remove Dressing.     2) Clean and dry the area with tap water using a Q-tip or sterile gauze pad.     3) Apply Vaseline, Aquaphor, Polysporin ointment or Bacitracin ointment over entire wound.  Do NOT use Neosporin ointment.     4) Cover the wound with a band-aid, or a sterile non-stick gauze pad and micropore paper tape      REPEAT THESE INSTRUCTIONS AT LEAST ONCE A DAY UNTIL THE WOUND HAS COMPLETELY HEALED.    It is an old wives tale that a wound heals better when it is exposed to air and allowed to dry out. The wound will heal faster with a better cosmetic result if it is kept moist with ointment and covered with a bandage.    **Do not let the wound dry out.**      Supplies Needed:      *Cotton tipped applicators (Q-tips)    *Polysporin Ointment or Bacitracin Ointment (NOT NEOSPORIN)    *Band-aids or non-stick gauze pads and micropore paper tape.      PATIENT INFORMATION:    During the healing process you will notice a number of changes. All wounds develop a small halo of redness surrounding the wound.  This means healing is occurring. Severe itching with extensive redness  usually indicates sensitivity to the ointment or bandage tape used to dress the wound.  You should call our office if this develops.      Swelling  and/or discoloration around your surgical site is common, particularly when performed around the eye.    All wounds normally drain.  The larger the wound the more drainage there will be.  After 7-10 days, you will notice the wound beginning to shrink and new skin will begin to grow.  The wound is healed when you can see skin has formed over the entire area.  A healed wound has a healthy, shiny look to the surface and is red to dark pink in color to normalize.  Wounds may take approximately 4-6 weeks to heal.  Larger wounds may take 6-8 weeks.  After the wound is healed you may discontinue dressing changes.    You may experience a sensation of tightness as your wound heals. This is normal and will gradually subside.    Your healed wound may be sensitive to temperature changes. This sensitivity improves with time, but if you re having a lot of discomfort, try to avoid temperature extremes.    Patients frequently experience itching after their wound appears to have healed because of the continue healing under the skin.  Plain Vaseline will help relieve the itching.        POSSIBLE COMPLICATIONS    BLEEDIN. Leave the bandage in place.  2. Use tightly rolled up gauze or a cloth to apply direct pressure over the bandage for 30  minutes.  3. Reapply pressure for an additional 30 minutes if necessary  4. Use additional gauze and tape to maintain pressure once the bleeding has stopped.            Follow-ups after your visit        Who to contact     If you have questions or need follow up information about today's clinic visit or your schedule please contact [facility] directly at [phone].  Normal or non-critical lab and imaging results will be communicated to you by MyChart, letter or phone within 4 business days after the clinic has received  the results. If you do not hear from us within 7 days, please contact the clinic through [employer] or phone. If you have a critical or abnormal lab result, we will notify you by phone as soon as possible.  Submit refill requests through [employer] or call your pharmacy and they will forward the refill request to us. Please allow 3 business days for your refill to be completed.          Additional Information About Your Visit        [employer] Information     [employer] gives you secure access to your electronic health record. If you see a primary care provider, you can also send messages to your care team and make appointments. If you have questions, please call your primary care clinic.  If you do not have a primary care provider, please call [phone] and they will assist you.        Care EveryWhere ID     This is your Care EveryWhere ID. This could be used by other organizations to access your [place] medical records  [id number]        Your Vitals Were     Pulse Pulse Oximetry                55 100%           Blood Pressure from Last 3 Encounters:   06/11/18 122/85   06/22/17 102/68   04/01/16 110/70    Weight from Last 3 Encounters:   06/22/17 103.2 kg (227 lb 8 oz)   04/01/16 105.4 kg (232 lb 5 oz)   02/18/16 104.4 kg (230 lb 1.6 oz)              We Performed the Following     BIOPSY SKIN/SUBQ/MUC MEM, SINGLE LESION     Dermatological path order and indications          Today's Medication Changes          These changes are accurate as of 6/11/18 11:13 AM.  If you have any questions, ask your nurse or doctor.               Start taking these medicines.        Dose/Directions    metroNIDAZOLE 0.75 % Lotn   Used for:  Dermatitis, perioral   Started by:  Altagracia Bose PA-C        Apply to affected area on face in the am x 4-6 weeks   Quantity:  59 mL   Refills:  11       tacrolimus 0.03 % ointment   Commonly known as:  PROTOPIC   Used for:  Dermatitis, perioral   Started by:  Altagracia Bose PA-C         Apply to affected area at night x 4-6 weeks   Quantity:  60 g   Refills:  1            Where to get your medicines      These medications were sent to [facility] [address], [place] [address]     Phone:  [phone]     metroNIDAZOLE 0.75 % Lotn    tacrolimus 0.03 % ointment                Primary Care Provider Office Phone # Fax #    Anai Dorantes [phone]       [address] [place] [address]        Equal Access to Services     [facility]: Hadii aad ku hadasho Soomaali, waaxda luqadaha, qaybta kaalmada adeegyada, waxay idiin hayaan adeeg lela fernandez . So [facility] [phone].    ATENCIÓN: Si habla español, tiene a nina disposición servicios gratuitos de asistencia lingüística. [facility] [phone].    We comply with applicable federal civil rights laws and [place] laws. We do not discriminate on the basis of race, color, national origin, age, disability, sex, sexual orientation, or gender identity.            Thank you!     Thank you for choosing [facility]  for your care. Our goal is always to provide you with excellent care. Hearing back from our patients is one way we can continue to improve our services. Please take a few minutes to complete the written survey that you may receive in the mail after your visit with us. Thank you!             Your Updated Medication List - Protect others around you: Learn how to safely use, store and throw away your medicines at www.disposemymeds.org.          This list is accurate as of 6/11/18 11:13 AM.  Always use your most recent med list.                   Brand Name Dispense Instructions for use Diagnosis    ALPRAZolam 0.25 MG tablet    XANAX    10 tablet    Take 1 tablet (0.25 mg) by mouth 3 times daily as needed for anxiety    Adjustment disorder with mixed emotional features       cholecalciferol 27476 units  capsule    VITAMIN D3    8 capsule    Take 1 capsule (50,000 Units) by mouth once a week    Vitamin D deficiency       CLARITIN 10 MG tablet   Generic drug:  loratadine      Take 10 mg by mouth daily as needed.        levothyroxine 137 MCG tablet    SYNTHROID/LEVOTHROID    90 tablet    Take 1 tablet (137 mcg) by mouth daily    Hypothyroidism, postsurgical       metroNIDAZOLE 0.75 % Lotn     59 mL    Apply to affected area on face in the am x 4-6 weeks    Dermatitis, perioral       tacrolimus 0.03 % ointment    PROTOPIC    60 g    Apply to affected area at night x 4-6 weeks    Dermatitis, perioral       traZODone 100 MG tablet    DESYREL    30 tablet    Take 1 tablet (100 mg) by mouth nightly as needed for sleep    Insomnia, unspecified type

## 2018-06-11 NOTE — PROGRESS NOTES
HPI:  Kaleigh Nam is a 43 year old year old female patient here today for red scaly pimples around eyes.    Duration: last few months on and off  Symptoms:  painful     Previous treatments: none     Alleviating/aggravating factors: none    Associated symptoms: none  Additional findings:new spot on left thigh x a few weeks  Patient has no other skin complaints today.  Remainder of the HPI, Meds, PMH, Allergies, FH, and SH was reviewed in chart.      Past Medical History:   Diagnosis Date     Allergic rhinitis, cause unspecified      Anxiety state, unspecified      Graves's Disease      Multinodular Goiter      Severe pre-eclampsia, unspecified as to episode of care        Past Surgical History:   Procedure Laterality Date     C  DELIVERY ONLY      , Low Cervical     C  DELIVERY ONLY      , Low Cervical     C  DELIVERY ONLY      , Low Cervical     C THYROIDECTOMY          Family History   Problem Relation Age of Onset     Thyroid Disease Mother      Other - See Comments Mother      prediabetes     OSTEOPOROSIS Mother      Hypertension Sister      DIABETES Sister      Hypertension Sister      Congenital Anomalies Son      autism, cognitive delay       Social History     Social History     Marital status:      Spouse name: N/A     Number of children: N/A     Years of education: N/A     Occupational History     Not on file.     Social History Main Topics     Smoking status: Never Smoker     Smokeless tobacco: Never Used     Alcohol use 1.8 oz/week     3 Standard drinks or equivalent per week      Comment: 1 month     Drug use: No     Sexual activity: Yes     Partners: Male     Birth control/ protection: Surgical     Other Topics Concern     Parent/Sibling W/ Cabg, Mi Or Angioplasty Before 65f 55m? No     Social History Narrative    , .  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         Outpatient Encounter Prescriptions as of 2018    Medication Sig Dispense Refill     metroNIDAZOLE 0.75 % LOTN Apply to affected area on face in the am x 4-6 weeks 59 mL 11     tacrolimus (PROTOPIC) 0.03 % ointment Apply to affected area at night x 4-6 weeks 60 g 1     ALPRAZolam (XANAX) 0.25 MG tablet Take 1 tablet (0.25 mg) by mouth 3 times daily as needed for anxiety (Patient not taking: Reported on 6/11/2018) 10 tablet 0     cholecalciferol (VITAMIN D3) 23724 UNITS capsule Take 1 capsule (50,000 Units) by mouth once a week 8 capsule 0     levothyroxine (SYNTHROID/LEVOTHROID) 137 MCG tablet Take 1 tablet (137 mcg) by mouth daily 90 tablet 3     loratadine (CLARITIN) 10 MG tablet Take 10 mg by mouth daily as needed.       traZODone (DESYREL) 100 MG tablet Take 1 tablet (100 mg) by mouth nightly as needed for sleep (Patient not taking: Reported on 6/11/2018) 30 tablet 3     No facility-administered encounter medications on file as of 6/11/2018.        Review Of Systems:  Skin: As above  Eyes: negative  Ears/Nose/Throat: negative  Respiratory: No shortness of breath, dyspnea on exertion, cough, or hemoptysis  Cardiovascular: negative  Gastrointestinal: negative  Genitourinary: negative  Musculoskeletal: negative  Neurologic: negative  Psychiatric: negative  Hematologic/Lymphatic/Immunologic: negative  Endocrine: negative      Objective:     /85  Pulse 55  SpO2 100%  Eyes: Conjunctivae/lids: Normal   ENT: Lips:  Normal  MSK: Normal  Cardiovascular: Peripheral edema none  Pulm: Breathing Normal  Neuro/Psych: Orientation: Normal; Mood/Affect: Normal, NAD, WDWN  Following areas examined:   Scalp, face, eyelids, lips, neck, chest, abdomen, back, buttocks, and R&L upper and lower extremities.      Findings:  1)Well circumscribed macules with symmetric color distribution on trunk and extremities.  2)Flesh colored papule/s with yellow lobules and central depression on face  3) smooth white WD papules perioral  4)Tan WD smooth macules on face, neck, trunk, and  extremities.  5) WD dark brown smooth macule on Left medial proximal thigh 0.15cm  6) red papules periorbital    Assessment and Plan:  1-4) benign nevi, cherry angiomas, SGH and lentigines  I discussed the specifics of tumor, prognosis, and genetics of benign lesions.  I explained that treatment of these lesions would be purely cosmetic and not medically neccessary.  I discussed with patient different removal options including excision, cryotherapy, cautery and /or laser.      5)Neoplasm of uncertain behavior on Left medial proximal thigh 0.15cm    Rule out atypical nevus  TANGENTIAL BIOPSY:  After consent, anesthesia with LEC and prep, tangential biopsy performed.  No complications and routine wound care.  May grow back and will get a scar. Based on lesion type may need to completely remove lesion. Patient will be notified in 7-10 days of results. Wound care directions given.    6)  periorbital dermatitis  Disc etiology and course.   Disc topicals and oral agents. Pt elects topicals.   Apply topical protopic to affected area on face in the am x 4-6 weeks  Apply topical metronidazole to affected area on face in the pm x 4-6 weeks.      Signs and Symptoms of non-melanoma skin cancer and ABCDEs of melanoma reviewed with patient. Patient encouraged to perform monthly self skin exams. UV precautions reviewed with patient.       Follow up in 6 weeks. Yearly for FBE

## 2018-06-16 LAB — COPATH REPORT: NORMAL

## 2018-06-18 ENCOUNTER — TELEPHONE (OUTPATIENT)
Dept: DERMATOLOGY | Facility: CLINIC | Age: 43
End: 2018-06-18

## 2018-06-18 NOTE — TELEPHONE ENCOUNTER
Called and LMf or patient to call back in regards to biopsy results.  [place] RN-BSN  [facility]  [phone]

## 2018-06-18 NOTE — TELEPHONE ENCOUNTER
Notes Recorded by Altagracia Bose PA-C on 6/18/2018 at 11:39 AM  Skin, left medial proximal thigh:   - Lentiginous compound melanocytic nevus (normal benign mole)  This is a benign lesion that does not need any additional treatment. Please let me know if you have any questions.

## 2018-06-18 NOTE — TELEPHONE ENCOUNTER
bubl Message Sent:    Dimitry Del Rosario-    Your biopsy results came back on your left medial proximal thigh:     This is a benign lesion that does not need any additional treatment.    Please let me know if you have any questions.     ERENDIRA Merida-BSN  [facility]  [phone]

## 2018-07-19 ENCOUNTER — RADIANT APPOINTMENT (OUTPATIENT)
Dept: GENERAL RADIOLOGY | Facility: CLINIC | Age: 43
End: 2018-07-19
Attending: FAMILY MEDICINE
Payer: COMMERCIAL

## 2018-07-19 ENCOUNTER — OFFICE VISIT (OUTPATIENT)
Dept: ORTHOPEDICS | Facility: CLINIC | Age: 43
End: 2018-07-19
Payer: COMMERCIAL

## 2018-07-19 VITALS
DIASTOLIC BLOOD PRESSURE: 78 MMHG | BODY MASS INDEX: 36.48 KG/M2 | SYSTOLIC BLOOD PRESSURE: 114 MMHG | WEIGHT: 227 LBS | HEIGHT: 66 IN

## 2018-07-19 DIAGNOSIS — M25.561 ARTHRALGIA OF RIGHT LOWER LEG: Primary | ICD-10-CM

## 2018-07-19 DIAGNOSIS — M25.561 ARTHRALGIA OF RIGHT LOWER LEG: ICD-10-CM

## 2018-07-19 DIAGNOSIS — M22.2X1 PATELLOFEMORAL PAIN SYNDROME OF RIGHT KNEE: ICD-10-CM

## 2018-07-19 DIAGNOSIS — R29.898 WEAKNESS OF RIGHT HIP: ICD-10-CM

## 2018-07-19 PROCEDURE — 73562 X-RAY EXAM OF KNEE 3: CPT

## 2018-07-19 PROCEDURE — 99213 OFFICE O/P EST LOW 20 MIN: CPT | Performed by: FAMILY MEDICINE

## 2018-07-19 NOTE — MR AVS SNAPSHOT
After Visit Summary   7/19/2018    Kaleigh Nam    MRN: [id number]           Patient Information     Date Of Birth          1975        Visit Information        Provider Department      7/19/2018 1:20 PM Cecilia Almanza, DO [facility]        Today's Diagnoses     Arthralgia of right lower leg    -  1    Patellofemoral pain syndrome of right knee        Weakness of right hip          Care Instructions    1. Arthralgia of right lower leg    2. Patellofemoral pain syndrome of right knee    3. Weakness of right hip      Discussed xray - no significant arthritis, lateral position to your kneecap with effects how it tracks in the groove (patellofemoral joint)  Discussed exam - Need to strengthen your thigh, hip and buttock. Knee is otherwise stable and no fluid in the joint  Would also benefit from kinesiotaping and therapy can show you how to do this  Activity modification as discussed - squats, lunges, stair, sit-to-stand and kneeling will bother you  Would not do more than 3x/week with squats/lunges for exercise. Use lighter weight and more reps.  Physical therapy: [facility] - [phone]  Can take up to 8 weeks to change your strength    Follow up after 8 PT sessions.          Follow-ups after your visit        Additional Services     [facility] PT, HAND, AND CHIROPRACTIC REFERRAL       **This order will print in the [facility] Scheduling Office**    Physical Therapy, Hand Therapy and Chiropractic Care are available through:    *[facility]  *[facility]  *[facility]    Call one number to schedule at any of the above locations: [phone].    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 Please also include pain-free closed chain/isometric/isotonic strengthening with use of modalities (including kinesioptaping) as needed/deemed appropriate with home exercise prescription.      Please be aware that coverage of these services is subject to the terms and limitations of your health insurance plan.  Call member services at your health plan with any benefit or coverage questions.      Please bring the following to your appointment:    *Your personal calendar for scheduling future appointments  *Comfortable clothing                  Who to contact     If you have questions or need follow up information about today's clinic visit or your schedule please contact [facility] directly at [phone].  Normal or non-critical lab and imaging results will be communicated to you by Azendo[employer]art, letter or phone within 4 business days after the clinic has received the results. If you do not hear from us within 7 days, please contact the clinic through [employer] or phone. If you have a critical or abnormal lab result, we will notify you by phone as soon as possible.  Submit refill requests through [employer] or call your pharmacy and they will forward the refill request to us. Please allow 3 business days for your refill to be completed.          Additional Information About Your Visit        [employer] Information     [employer] gives you secure access to your electronic health record. If you see a primary care provider, you can also send messages to your care team and make appointments. If you have questions, please call your primary care clinic.  If you do not have a primary care provider, please call [phone] and they will assist you.        Care EveryWhere ID     This is your Care  "EveryWhere ID. This could be used by other organizations to access your [place] medical records  [id number]        Your Vitals Were     Height BMI (Body Mass Index)                5' 5.5\" (1.664 m) 37.2 kg/m2           Blood Pressure from Last 3 Encounters:   07/19/18 114/78   06/11/18 122/85   06/22/17 102/68    Weight from Last 3 Encounters:   07/19/18 227 lb (103 kg)   06/22/17 227 lb 8 oz (103.2 kg)   04/01/16 232 lb 5 oz (105.4 kg)              We Performed the Following     [facility] PT, HAND, AND CHIROPRACTIC REFERRAL        Primary Care Provider Office Phone # Fax #    Anai Dorantes [phone] [address] [place] [address]        Equal Access to Services     [facility]: Hadii aad ku hadasho Soomaali, waaxda luqadaha, qaybta kaalmada adeegyada, gómez wilson haygeovany fernandez . So [facility] [phone].    ATENCIÓN: Si habla español, tiene a nina disposición servicios gratuitos de asistencia lingüística. Llame al [phone].    We comply with applicable federal civil rights laws and [place] laws. We do not discriminate on the basis of race, color, national origin, age, disability, sex, sexual orientation, or gender identity.            Thank you!     Thank you for choosing [facility]  for your care. Our goal is always to provide you with excellent care. Hearing back from our patients is one way we can continue to improve our services. Please take a few minutes to complete the written survey that you may receive in the mail after your visit with us. Thank you!             Your Updated Medication List - Protect others around you: Learn how to safely use, store and throw away your medicines at www.disposemymeds.org.          This list is accurate as of 7/19/18  2:01 PM.  Always use your most recent med list.                   Brand Name Dispense Instructions for use Diagnosis    ALPRAZolam 0.25 MG tablet    XANAX    10 tablet    Take 1 " tablet (0.25 mg) by mouth 3 times daily as needed for anxiety    Adjustment disorder with mixed emotional features       cholecalciferol 97537 units capsule    VITAMIN D3    8 capsule    Take 1 capsule (50,000 Units) by mouth once a week    Vitamin D deficiency       CLARITIN 10 MG tablet   Generic drug:  loratadine      Take 10 mg by mouth daily as needed.        levothyroxine 137 MCG tablet    SYNTHROID/LEVOTHROID    90 tablet    Take 1 tablet (137 mcg) by mouth daily    Hypothyroidism, postsurgical       metroNIDAZOLE 0.75 % Lotn     59 mL    Apply to affected area on face in the am x 4-6 weeks    Dermatitis, perioral       tacrolimus 0.03 % ointment    PROTOPIC    60 g    Apply to affected area at night x 4-6 weeks    Dermatitis, perioral       traZODone 100 MG tablet    DESYREL    30 tablet    Take 1 tablet (100 mg) by mouth nightly as needed for sleep    Insomnia, unspecified type

## 2018-07-19 NOTE — LETTER
7/19/2018         RE: Kaleigh Nam  [address]  [place] [address]        Dear Colleague,    Thank you for referring your patient, Kaleigh Nam, to the [facility]. Please see a copy of my visit note below.    ASSESSMENT & PLAN    1. Arthralgia of right lower leg    2. Patellofemoral pain syndrome of right knee    3. Weakness of right hip      Discussed xray - no significant arthritis, lateral patellar tilt/subluxation  Discussed exam - Need to strengthen your thigh, hip and buttock. Knee is otherwise stable and no effusion  Would also benefit from kinesiotaping   Activity modification as discussed - squats, lunges, stair, sit-to-stand and kneeling will bother you  Would not do more than 3x/week with squats/lunges for exercise. Use lighter weight and more reps.  Physical therapy: [facility] - [phone]  Can take up to 8 weeks to change your strength    Follow up after 8 PT sessions.    -----    SUBJECTIVE  Kaleigh Nam is a/an 43 year old female who is seen as a self referral for evaluation of right knee pain. The patient is seen by themselves.    Onset: 5 month(s) ago. Denies any trauma, started exercising more the beginning of the year including a lot of lunges and squats  Location of Pain: right anterior knee pain that has spread to the medial and lateral joint lines  Rating of Pain at worst: 7/10  Rating of Pain Currently: 1/10  Worsened by: stairs, squatting, hiking, pounding exercises, mowing, walking on uneven ground  Better with: rest  Treatments tried: rest/activity avoidance, ice and stretching  Associated symptoms: mild swelling,  loss of knee flexion, weakness/instability, catching  Orthopedic history: left knee meniscal tear  Relevant surgical history: L knee arthroscopy, partial lateral meniscectomy, DOS 4/6/2016, Dr. Fernandez  Patient Social History: works as a NICU nurse    Patient's past medical, surgical, social, and family  "histories were reviewed today and no changes are noted.    REVIEW OF SYSTEMS:  10 point ROS is negative other than symptoms noted above in HPI, Past Medical History or as stated below  Constitutional: NEGATIVE for fever, chills, change in weight  Skin: NEGATIVE for worrisome rashes, moles or lesions  GI/: NEGATIVE for bowel or bladder changes  Neuro: NEGATIVE for weakness, dizziness or paresthesias    OBJECTIVE:  /78  Ht 5' 5.5\" (1.664 m)  Wt 227 lb (103 kg)  BMI 37.2 kg/m2   General: healthy, alert and in no distress  HEENT: no scleral icterus or conjunctival erythema  Skin: no suspicious lesions or rash. No jaundice.  CV: no pedal edema  Resp: normal respiratory effort without conversational dyspnea   Psych: normal mood and affect  Gait: normal steady gait with appropriate coordination and balance  Neuro: Normal light sensory exam of lower extremity  MSK:  RIGHT KNEE  Inspection:    normal alignment  Palpation:    Tender about the medial patellar facet and medial joint line. Remainder of bony and ligamentous landmarks are nontender.    No effusion is present    Patellofemoral crepitus is Present  Range of Motion:     00 extension to 1350 flexion  Strength:    Quadriceps and gluteus weakness     Extensor mechanism intact  Special Tests:    Negative: MCL/valgus stress (0 & 30 deg), LCL/varus stress (0 & 30 deg), Lachman's, posterior drawer, Jeaneth's    Independent visualization of the below image:  Recent Results (from the past 24 hour(s))   XR Knee Standing AP Bilat [place] Bilat Lat Right    Narrative    KNEE STANDING AP BILATERAL SUNRISE BILATERAL LATERAL RIGHT 7/19/2018  1:47 PM     HISTORY: Arthralgia of right lower leg.       Impression    IMPRESSION:  1. Right knee 3 views: Joint effusion. There appears to be cystic  resorptive change at the tibial spine region. Mild lateral patellar  subluxation and tilting.  2. Left knee 2 views: Mild lateral compartment narrowing.    RA MANZO MD "     Patient's conditions were thoroughly discussed during today's visit with greater than 50% of the visit spent counseling the patient with total time spent face-to-face with the patient being 20 minutes.    Cecilia Almanza DO [facility]      Again, thank you for allowing me to participate in the care of your patient.        Sincerely,        Cecilia Almanza, DO

## 2018-07-19 NOTE — PATIENT INSTRUCTIONS
1. Arthralgia of right lower leg    2. Patellofemoral pain syndrome of right knee    3. Weakness of right hip      Discussed xray - no significant arthritis, lateral position to your kneecap with effects how it tracks in the groove (patellofemoral joint)  Discussed exam - Need to strengthen your thigh, hip and buttock. Knee is otherwise stable and no fluid in the joint  Would also benefit from kinesiotaping and therapy can show you how to do this  Activity modification as discussed - squats, lunges, stair, sit-to-stand and kneeling will bother you  Would not do more than 3x/week with squats/lunges for exercise. Use lighter weight and more reps.  Physical therapy: [facility] - [phone]  Can take up to 8 weeks to change your strength    Follow up after 8 PT sessions.

## 2018-08-06 ENCOUNTER — THERAPY VISIT (OUTPATIENT)
Dept: PHYSICAL THERAPY | Facility: CLINIC | Age: 43
End: 2018-08-06
Payer: COMMERCIAL

## 2018-08-06 DIAGNOSIS — M22.2X1 PATELLOFEMORAL PAIN SYNDROME OF RIGHT KNEE: Primary | ICD-10-CM

## 2018-08-06 PROCEDURE — 97161 PT EVAL LOW COMPLEX 20 MIN: CPT | Mod: GP | Performed by: PHYSICAL THERAPIST

## 2018-08-06 PROCEDURE — 97112 NEUROMUSCULAR REEDUCATION: CPT | Mod: GP | Performed by: PHYSICAL THERAPIST

## 2018-08-06 PROCEDURE — 97110 THERAPEUTIC EXERCISES: CPT | Mod: GP | Performed by: PHYSICAL THERAPIST

## 2018-08-06 NOTE — PROGRESS NOTES
[facility] Initial Evaluation  Subjective:  Patient is a 43 year old female presenting with rehab right knee hpi. The history is provided by the patient.   Kaleigh Nam is a 43 year old female with a right knee condition.  Condition occurred with:  Repetition/overuse.  Condition occurred: during recreation/sport.  This is a new condition  Patient began having knee pain in April while working out which involved high repetition weighted squats and lunges and some pyrometrics. She was also having pain with lawn care. She has had reduced symptoms recently as she has limited her activity. MD referral on 7/19/18.  .    Patient reports pain:  Anterior and lateral.    Pain is described as aching and is intermittent and reported as 4/10.  Associated symptoms:  Loss of strength, edema and loss of motion/stiffness. Pain is the same all the time.  Symptoms are exacerbated by ascending stairs, descending stairs, sitting, kneeling, bending/squatting, standing and running and relieved by rest and NSAID's.  Since onset symptoms are gradually improving.  Special tests:  X-ray.      General health as reported by patient is good.  Pertinent medical history includes:  Overweight and thyroid problems.  Medical allergies: no.  Other surgeries include:  Other.  Current medications:  Thyroid medication.  Current occupation is RN in the NICU.  Patient is working in normal job without restrictions.  Primary job tasks include:  Prolonged standing, prolonged sitting and repetitive tasks.    Barriers include:  None as reported by the patient.    Red flags:  Pain at night/rest.                        Objective:  Standing Alignment:              Knee:  Genu valgus R and genu valgus L (Laterally titled and shifted patella bilaterally.)                                                       Hip Evaluation    Hip Strength:      Extension:  Left: 4/5  Pain:Right: 4/5    Pain:    Abduction:  Left: 4/5     Pain:Right: 4/5     Pain:      External Rotation:  Left: 4/5   Pain:  Right: 4/5   Pain:                     Knee Evaluation:  ROM:  AROM: normal  Strength:  Normal            Ligament Testing:  Normal                Special Tests: Normal      Palpation:  Normal      Edema:  Normal    Mobility Testing:  Normal            Functional Testing:          Quad:    Single Leg Squat:  Left:       Right:        Bilateral Leg Squat:  65 degrees  Femoral IR and normal control              General     ROS    Assessment/Plan:    Patient is a 43 year old female with right side knee complaints.    Patient has the following significant findings with corresponding treatment plan.                Diagnosis 1:  R Patellofemoral Pain Syndrome  Pain -  hot/cold therapy, US, electric stimulation, manual therapy, splint/taping/bracing/orthotics, self management, education and home program  Impaired muscle performance - electric stimulation, neuro re-education and home program  Decreased function - therapeutic activities and home program  Impaired posture - neuro re-education, therapeutic activities and home program    Therapy Evaluation Codes:   1) History comprised of:   Personal factors that impact the plan of care:      Time since onset of symptoms.    Comorbidity factors that impact the plan of care are:      Overweight, Pain at night/rest and Thyroid problems.     Medications impacting care: Thyroid.  2) Examination of Body Systems comprised of:   Body structures and functions that impact the plan of care:      Knee.   Activity limitations that impact the plan of care are:      Lifting, Running, Sports, Squatting/kneeling, Stairs, Standing and Working.  3) Clinical presentation characteristics are:   Stable/Uncomplicated.  4) Decision-Making    Low complexity using standardized patient assessment instrument and/or measureable assessment of functional outcome.  Cumulative Therapy Evaluation is: Low complexity.    Previous and current functional  limitations:  (See Goal Flow Sheet for this information)    Short term and Long term goals: (See Goal Flow Sheet for this information)     Communication ability:  Patient appears to be able to clearly communicate and understand verbal and written communication and follow directions correctly.  Treatment Explanation - The following has been discussed with the patient:   RX ordered/plan of care  Anticipated outcomes  Possible risks and side effects  This patient would benefit from PT intervention to resume normal activities.   Rehab potential is good.    Frequency:  1 X week, once daily  Duration:  for 4 weeks tapering to 2 X a month over 4 weeks  Discharge Plan:  Achieve all LTG.  Independent in home treatment program.  Reach maximal therapeutic benefit.    Please refer to the daily flowsheet for treatment today, total treatment time and time spent performing 1:1 timed codes.

## 2018-08-07 ASSESSMENT — ACTIVITIES OF DAILY LIVING (ADL)
KNEE_ACTIVITY_OF_DAILY_LIVING_SCORE: 85.71
SWELLING: THE SYMPTOM AFFECTS MY ACTIVITY SLIGHTLY
PAIN: I DO NOT HAVE THE SYMPTOM
GO DOWN STAIRS: ACTIVITY IS MINIMALLY DIFFICULT
SIT WITH YOUR KNEE BENT: ACTIVITY IS MINIMALLY DIFFICULT
STAND: ACTIVITY IS NOT DIFFICULT
WALK: ACTIVITY IS NOT DIFFICULT
GIVING WAY, BUCKLING OR SHIFTING OF KNEE: I DO NOT HAVE THE SYMPTOM
WEAKNESS: THE SYMPTOM AFFECTS MY ACTIVITY SLIGHTLY
SQUAT: ACTIVITY IS NOT DIFFICULT
AS_A_RESULT_OF_YOUR_KNEE_INJURY,_HOW_WOULD_YOU_RATE_YOUR_CURRENT_LEVEL_OF_DAILY_ACTIVITY?: ABNORMAL
HOW_WOULD_YOU_RATE_THE_OVERALL_FUNCTION_OF_YOUR_KNEE_DURING_YOUR_USUAL_DAILY_ACTIVITIES?: ABNORMAL
RAW_SCORE: 60
STIFFNESS: THE SYMPTOM AFFECTS MY ACTIVITY SLIGHTLY
KNEEL ON THE FRONT OF YOUR KNEE: ACTIVITY IS MINIMALLY DIFFICULT
GO UP STAIRS: ACTIVITY IS NOT DIFFICULT
HOW_WOULD_YOU_RATE_THE_CURRENT_FUNCTION_OF_YOUR_KNEE_DURING_YOUR_USUAL_DAILY_ACTIVITIES_ON_A_SCALE_FROM_0_TO_100_WITH_100_BEING_YOUR_LEVEL_OF_KNEE_FUNCTION_PRIOR_TO_YOUR_INJURY_AND_0_BEING_THE_INABILITY_TO_PERFORM_ANY_OF_YOUR_USUAL_DAILY_ACTIVITIES?: 80
RISE FROM A CHAIR: ACTIVITY IS MINIMALLY DIFFICULT
LIMPING: I DO NOT HAVE THE SYMPTOM
KNEE_ACTIVITY_OF_DAILY_LIVING_SUM: 60

## 2018-09-07 ENCOUNTER — OFFICE VISIT (OUTPATIENT)
Dept: PEDIATRICS | Facility: CLINIC | Age: 43
End: 2018-09-07
Payer: COMMERCIAL

## 2018-09-07 VITALS
HEIGHT: 66 IN | BODY MASS INDEX: 37.24 KG/M2 | SYSTOLIC BLOOD PRESSURE: 118 MMHG | HEART RATE: 64 BPM | TEMPERATURE: 98.4 F | DIASTOLIC BLOOD PRESSURE: 78 MMHG | WEIGHT: 231.7 LBS | OXYGEN SATURATION: 99 %

## 2018-09-07 DIAGNOSIS — Z12.31 ENCOUNTER FOR SCREENING MAMMOGRAM FOR BREAST CANCER: ICD-10-CM

## 2018-09-07 DIAGNOSIS — Z00.00 ROUTINE GENERAL MEDICAL EXAMINATION AT A HEALTH CARE FACILITY: Primary | ICD-10-CM

## 2018-09-07 DIAGNOSIS — E89.0 HYPOTHYROIDISM, POSTSURGICAL: ICD-10-CM

## 2018-09-07 DIAGNOSIS — E66.9 OBESITY (BMI 30-39.9): ICD-10-CM

## 2018-09-07 PROCEDURE — 99396 PREV VISIT EST AGE 40-64: CPT | Performed by: INTERNAL MEDICINE

## 2018-09-07 PROCEDURE — 84443 ASSAY THYROID STIM HORMONE: CPT | Performed by: INTERNAL MEDICINE

## 2018-09-07 PROCEDURE — 36415 COLL VENOUS BLD VENIPUNCTURE: CPT | Performed by: INTERNAL MEDICINE

## 2018-09-07 PROCEDURE — 84439 ASSAY OF FREE THYROXINE: CPT | Performed by: INTERNAL MEDICINE

## 2018-09-07 ASSESSMENT — ENCOUNTER SYMPTOMS
PARESTHESIAS: 0
ARTHRALGIAS: 0
CHILLS: 0
DYSURIA: 0
HEMATOCHEZIA: 0
FREQUENCY: 0
BREAST MASS: 0
JOINT SWELLING: 0
COUGH: 0
WEAKNESS: 0
SORE THROAT: 1
ABDOMINAL PAIN: 0
CONSTIPATION: 0
FEVER: 0
HEMATURIA: 0
HEADACHES: 0
DIARRHEA: 0
PALPITATIONS: 0
NERVOUS/ANXIOUS: 0
MYALGIAS: 0
NAUSEA: 0
SHORTNESS OF BREATH: 0
EYE PAIN: 0
HEARTBURN: 0
DIZZINESS: 0

## 2018-09-07 NOTE — PROGRESS NOTES
SUBJECTIVE:   CC: Kaleigh Nam is an 43 year old woman who presents for preventive health visit.     Physical   Annual:     Getting at least 3 servings of Calcium per day:  Yes    Bi-annual eye exam:  Yes    Dental care twice a year:  Yes    Sleep apnea or symptoms of sleep apnea:  None    Diet:  Regular (no restrictions)    Frequency of exercise:  4-5 days/week    Duration of exercise:  30-45 minutes    Taking medications regularly:  Yes    Medication side effects:  None    Additional concerns today:  No    Patient reports she is still having pain in her knees. Is able to exercise with less weight and has improved pain. Eats a healthy balanced diet, likes vegetables and whole grains; walks and does yoga and pillaties for exercise.         Today's PHQ-2 Score:   PHQ-2 ( 1999 [employer]) 9/7/2018   Q1: Little interest or pleasure in doing things 0   Q2: Feeling down, depressed or hopeless 0   PHQ-2 Score 0   Q1: Little interest or pleasure in doing things Not at all   Q2: Feeling down, depressed or hopeless Not at all   PHQ-2 Score 0       Abuse: Current or Past(Physical, Sexual or Emotional)- No  Do you feel safe in your environment - Yes    Social History   Substance Use Topics     Smoking status: Never Smoker     Smokeless tobacco: Never Used     Alcohol use 1.8 oz/week     3 Standard drinks or equivalent per week      Comment: 1 month     Alcohol Use 9/7/2018   If you drink alcohol do you typically have greater than 3 drinks per day OR greater than 7 drinks per week? No   No flowsheet data found.    Reviewed orders with patient.  Reviewed health maintenance and updated orders accordingly - Yes  Labs reviewed in EPIC  BP Readings from Last 3 Encounters:   09/07/18 118/78   07/19/18 114/78   06/11/18 122/85    Wt Readings from Last 3 Encounters:   09/07/18 105.1 kg (231 lb 11.2 oz)   07/19/18 103 kg (227 lb)   06/22/17 103.2 kg (227 lb 8 oz)                  Patient Active Problem List   Diagnosis      CARDIOVASCULAR SCREENING; LDL GOAL LESS THAN 160     Hypothyroidism, postsurgical     Family history of diabetes mellitus     Obesity (BMI 30-39.9)     Vitamin D deficiency     Patellofemoral pain syndrome of right knee     Past Surgical History:   Procedure Laterality Date     C  DELIVERY ONLY      , Low Cervical     C  DELIVERY ONLY      , Low Cervical     C  DELIVERY ONLY      , Low Cervical     C THYROIDECTOMY         Social History   Substance Use Topics     Smoking status: Never Smoker     Smokeless tobacco: Never Used     Alcohol use 1.8 oz/week     3 Standard drinks or equivalent per week      Comment: 1 month     Family History   Problem Relation Age of Onset     Thyroid Disease Mother      Other - See Comments Mother      prediabetes     Osteoporosis Mother      Hypertension Sister      Diabetes Sister      Hypertension Sister      Congenital Anomalies Son      autism, cognitive delay         Current Outpatient Prescriptions   Medication Sig Dispense Refill     cholecalciferol (VITAMIN D3) 50414 UNITS capsule Take 1 capsule (50,000 Units) by mouth once a week 8 capsule 0     levothyroxine (SYNTHROID/LEVOTHROID) 137 MCG tablet Take 1 tablet (137 mcg) by mouth daily 90 tablet 3     loratadine (CLARITIN) 10 MG tablet Take 10 mg by mouth daily as needed.       metroNIDAZOLE 0.75 % LOTN Apply to affected area on face in the am x 4-6 weeks (Patient not taking: Reported on 2018) 59 mL 11     tacrolimus (PROTOPIC) 0.03 % ointment Apply to affected area at night x 4-6 weeks (Patient not taking: Reported on 2018) 60 g 1     Allergies   Allergen Reactions     No Known Drug Allergies        Patient under age 50, mutual decision reflected in health maintenance.      Pertinent mammograms are reviewed under the imaging tab.  History of abnormal Pap smear:   Last 3 Pap Results:   PAP (no units)   Date Value   2017 NIL   2014 NIL   2007  "NIL     PAP / HPV Latest Ref Rng & Units 6/22/2017 7/24/2014 12/4/2007   PAP - NIL NIL NIL   HPV 16 DNA NEG Negative - -   HPV 18 DNA NEG Negative - -   OTHER HR HPV NEG Negative - -     Reviewed and updated as needed this visit by clinical staff  Tobacco  Allergies  Meds  Med Hx  Surg Hx  Fam Hx  Soc Hx        Reviewed and updated as needed this visit by Provider            Review of Systems   Constitutional: Negative for chills and fever.   HENT: Positive for sore throat. Negative for congestion, ear pain and hearing loss.    Eyes: Negative for pain and visual disturbance.   Respiratory: Negative for cough and shortness of breath.    Cardiovascular: Negative for chest pain, palpitations and peripheral edema.   Gastrointestinal: Negative for abdominal pain, constipation, diarrhea, heartburn, hematochezia and nausea.   Breasts:  Negative for tenderness, breast mass and discharge.   Genitourinary: Positive for vaginal bleeding. Negative for dysuria, frequency, genital sores, hematuria, pelvic pain, urgency and vaginal discharge.   Musculoskeletal: Negative for arthralgias, joint swelling and myalgias.   Skin: Negative for rash.   Neurological: Negative for dizziness, weakness, headaches and paresthesias.   Psychiatric/Behavioral: Negative for mood changes. The patient is not nervous/anxious.        This document serves as a record of the services and decisions personally performed and made by Anai Dorantes MD. It was created on her behalf by Florencia Siddiqui, a trained medical scribe. The creation of this document is based the provider's statements to the medical scribe.    Florencia Siddiqui September 7, 2018 11:41 AM   OBJECTIVE:   /78 (BP Location: Right arm, Patient Position: Sitting, Cuff Size: Adult Regular)  Pulse 64  Temp 98.4  F (36.9  C) (Oral)  Ht 1.664 m (5' 5.5\")  Wt 105.1 kg (231 lb 11.2 oz)  SpO2 99%  BMI 37.97 kg/m2  Physical Exam  GENERAL: healthy, alert and no distress  EYES: Eyes " grossly normal to inspection, PERRL and conjunctivae and sclerae normal  HENT: ear canals and TM's normal, nose and mouth without ulcers or lesions  NECK: no adenopathy, no asymmetry, masses, or scars and thyroid normal to palpation  RESP: lungs clear to auscultation - no rales, rhonchi or wheezes  BREAST: normal without masses, tenderness or nipple discharge and no palpable axillary masses or adenopathy  CV: regular rate and rhythm, normal S1 S2, no S3 or S4, no murmur, click or rub   ABDOMEN: soft, nontender,  and bowel sounds normal.  Unable to assess HSM or masses due to body habitus.    MS: no gross musculoskeletal defects noted, no edema  SKIN: no suspicious lesions or rashes  NEURO: Normal strength and tone, mentation intact and speech normal  PSYCH: mentation appears normal, affect normal/bright    Diagnostic Test Results:  No results found for this or any previous visit (from the past 24 hour(s)).    ASSESSMENT/PLAN:   (Z00.00) Routine general medical examination at a health care facility  (primary encounter diagnosis)  -- immunizations utd; flu declined   -- pap utd; mammo due   -- encouraged regular exercise and balanced diet         (E89.0) Hypothyroidism, postsurgical  -- recheck thyroid labs today   -- will refill medication after results   Plan: TSH, T4 FREE            (E66.9) Obesity (BMI 30-39.9)  -- encouraged regular exercise and balanced diet     (Z12.31) Encounter for screening mammogram for breast cancer  -- schedule mammogram   -- recommended 3D mammogram if covered since patient has dense breasts   Plan: *MA Screening Digital Bilateral        Follow up for annual care or as needed       COUNSELING:  Reviewed preventive health counseling, as reflected in patient instructions       Regular exercise       Healthy diet/nutrition    BP Readings from Last 1 Encounters:   09/07/18 118/78     Estimated body mass index is 37.97 kg/(m^2) as calculated from the following:    Height as of this encounter:  "1.664 m (5' 5.5\").    Weight as of this encounter: 105.1 kg (231 lb 11.2 oz).      Weight management plan: Discussed healthy diet and exercise guidelines and patient will follow up in 12 months in clinic to re-evaluate.     reports that she has never smoked. She has never used smokeless tobacco.      Counseling Resources:  ATP IV Guidelines  Pooled Cohorts Equation Calculator  Breast Cancer Risk Calculator  FRAX Risk Assessment  ICSI Preventive Guidelines  Dietary Guidelines for Americans, 2010  USDA's MyPlate  ASA Prophylaxis  Lung CA Screening    The information in this document, created by the medical scribe for me, accurately reflects the services I personally performed and the decisions made by me. I have reviewed and approved this document for accuracy prior to leaving the patient care area.  Anai Dorantes MD  [facility]  Answers for HPI/ROS submitted by the patient on 9/7/2018   PHQ-2 Score: 0    "

## 2018-09-07 NOTE — MR AVS SNAPSHOT
After Visit Summary   9/7/2018    Kaleigh Nam    MRN: [id number]           Patient Information     Date Of Birth          1975        Visit Information        Provider Department      9/7/2018 11:00 AM Anai Dorantes MD [facility]        Today's Diagnoses     Routine general medical examination at a health care facility    -  1    Hypothyroidism, postsurgical        Obesity (BMI 30-39.9)        Encounter for screening mammogram for breast cancer          Care Instructions    Schedule mammogram.       Preventive Health Recommendations  Female Ages 40 to 49    Yearly exam:     See your health care provider every year in order to  1. Review health changes.   2. Discuss preventive care.    3. Review your medicines if your doctor prescribed any.      Get a Pap test every three years (unless you have an abnormal result and your provider advises testing more often).      If you get Pap tests with HPV test, you only need to test every 5 years, unless you have an abnormal result. You do not need a Pap test if your uterus was removed (hysterectomy) and you have not had cancer.      You should be tested each year for STDs (sexually transmitted diseases), if you're at risk.     Ask your doctor if you should have a mammogram.      Have a colonoscopy (test for colon cancer) if someone in your family has had colon cancer or polyps before age 50.       Have a cholesterol test every 5 years.       Have a diabetes test (fasting glucose) after age 45. If you are at risk for diabetes, you should have this test every 3 years.    Shots: Get a flu shot each year. Get a tetanus shot every 10 years.     Nutrition:     Eat at least 5 servings of fruits and vegetables each day.    Eat whole-grain bread, whole-wheat pasta and brown rice instead of white grains and rice.    Get adequate Calcium and Vitamin D.      Lifestyle    Exercise at least 150 minutes a week (an average of 30 minutes a day,  5 days a week). This will help you control your weight and prevent disease.    Limit alcohol to one drink per day.    No smoking.     Wear sunscreen to prevent skin cancer.    See your dentist every six months for an exam and cleaning.          Follow-ups after your visit        Follow-up notes from your care team     Return in about 1 year (around 9/7/2019) for Physical Exam.      Future tests that were ordered for you today     Open Future Orders        Priority Expected Expires Ordered    *MA Screening Digital Bilateral Routine  9/7/2019 9/7/2018            Who to contact     If you have questions or need follow up information about today's clinic visit or your schedule please contact [facility] directly at [phone].  Normal or non-critical lab and imaging results will be communicated to you by GraphOnhart, letter or phone within 4 business days after the clinic has received the results. If you do not hear from us within 7 days, please contact the clinic through GraphOnhart or phone. If you have a critical or abnormal lab result, we will notify you by phone as soon as possible.  Submit refill requests through Alltuition or call your pharmacy and they will forward the refill request to us. Please allow 3 business days for your refill to be completed.          Additional Information About Your Visit        GraphOnharModanisa Information     Alltuition gives you secure access to your electronic health record. If you see a primary care provider, you can also send messages to your care team and make appointments. If you have questions, please call your primary care clinic.  If you do not have a primary care provider, please call [phone] and they will assist you.        Care EveryWhere ID     This is your Care EveryWhere ID. This could be used by other organizations to access your [place] medical records  [id number]        Your Vitals Were     Pulse Temperature Height Pulse Oximetry BMI (Body Mass Index)       64 98.4  " F (36.9  C) (Oral) 5' 5.5\" (1.664 m) 99% 37.97 kg/m2        Blood Pressure from Last 3 Encounters:   09/07/18 118/78   07/19/18 114/78   06/11/18 122/85    Weight from Last 3 Encounters:   09/07/18 231 lb 11.2 oz (105.1 kg)   07/19/18 227 lb (103 kg)   06/22/17 227 lb 8 oz (103.2 kg)              We Performed the Following     T4 FREE     TSH          Today's Medication Changes          These changes are accurate as of 9/7/18 11:48 AM.  If you have any questions, ask your nurse or doctor.               Stop taking these medicines if you haven't already. Please contact your care team if you have questions.     ALPRAZolam 0.25 MG tablet   Commonly known as:  XANAX   Stopped by:  Anai Dorantes MD           traZODone 100 MG tablet   Commonly known as:  DESYREL   Stopped by:  Anai Dorantes MD                    Primary Care Provider Office Phone # Fax #    Anai Dorantes [phone] [address] [place] [address]        Equal Access to Services     [facility] AH: Hadii moises costa hadkumaro Sodesmondali, waaxda luqadaha, qaybta kaalmada adeegyada, gómez wen. So [facility] [phone].    ATENCIÓN: Si habla español, tiene a nina disposición servicios gratuitos de asistencia lingüística. [facility] [phone].    We comply with applicable federal civil rights laws and [place] laws. We do not discriminate on the basis of race, color, national origin, age, disability, sex, sexual orientation, or gender identity.            Thank you!     Thank you for choosing [facility]  for your care. Our goal is always to provide you with excellent care. Hearing back from our patients is one way we can continue to improve our services. Please take a few minutes to complete the written survey that you may receive in the mail after your visit with us. Thank you!             Your Updated Medication List - Protect others around you: Learn how " to safely use, store and throw away your medicines at www.disposemymeds.org.          This list is accurate as of 9/7/18 11:48 AM.  Always use your most recent med list.                   Brand Name Dispense Instructions for use Diagnosis    cholecalciferol 69917 units capsule    VITAMIN D3    8 capsule    Take 1 capsule (50,000 Units) by mouth once a week    Vitamin D deficiency       CLARITIN 10 MG tablet   Generic drug:  loratadine      Take 10 mg by mouth daily as needed.        levothyroxine 137 MCG tablet    SYNTHROID/LEVOTHROID    90 tablet    Take 1 tablet (137 mcg) by mouth daily    Hypothyroidism, postsurgical       metroNIDAZOLE 0.75 % Lotn     59 mL    Apply to affected area on face in the am x 4-6 weeks    Dermatitis, perioral       tacrolimus 0.03 % ointment    PROTOPIC    60 g    Apply to affected area at night x 4-6 weeks    Dermatitis, perioral

## 2018-09-07 NOTE — PATIENT INSTRUCTIONS
Schedule mammogram.       Preventive Health Recommendations  Female Ages 40 to 49    Yearly exam:     See your health care provider every year in order to  1. Review health changes.   2. Discuss preventive care.    3. Review your medicines if your doctor prescribed any.      Get a Pap test every three years (unless you have an abnormal result and your provider advises testing more often).      If you get Pap tests with HPV test, you only need to test every 5 years, unless you have an abnormal result. You do not need a Pap test if your uterus was removed (hysterectomy) and you have not had cancer.      You should be tested each year for STDs (sexually transmitted diseases), if you're at risk.     Ask your doctor if you should have a mammogram.      Have a colonoscopy (test for colon cancer) if someone in your family has had colon cancer or polyps before age 50.       Have a cholesterol test every 5 years.       Have a diabetes test (fasting glucose) after age 45. If you are at risk for diabetes, you should have this test every 3 years.    Shots: Get a flu shot each year. Get a tetanus shot every 10 years.     Nutrition:     Eat at least 5 servings of fruits and vegetables each day.    Eat whole-grain bread, whole-wheat pasta and brown rice instead of white grains and rice.    Get adequate Calcium and Vitamin D.      Lifestyle    Exercise at least 150 minutes a week (an average of 30 minutes a day, 5 days a week). This will help you control your weight and prevent disease.    Limit alcohol to one drink per day.    No smoking.     Wear sunscreen to prevent skin cancer.    See your dentist every six months for an exam and cleaning.

## 2018-09-08 LAB
T4 FREE SERPL-MCNC: 1.28 NG/DL (ref 0.76–1.46)
TSH SERPL DL<=0.005 MIU/L-ACNC: 1.62 MU/L (ref 0.4–4)

## 2018-09-09 RX ORDER — LEVOTHYROXINE SODIUM 137 UG/1
137 TABLET ORAL DAILY
Qty: 90 TABLET | Refills: 3 | Status: SHIPPED | OUTPATIENT
Start: 2018-09-09 | End: 2019-09-03

## 2018-09-19 ENCOUNTER — RADIANT APPOINTMENT (OUTPATIENT)
Dept: MAMMOGRAPHY | Facility: CLINIC | Age: 43
End: 2018-09-19
Attending: INTERNAL MEDICINE
Payer: COMMERCIAL

## 2018-09-19 DIAGNOSIS — Z12.31 ENCOUNTER FOR SCREENING MAMMOGRAM FOR BREAST CANCER: ICD-10-CM

## 2018-09-19 PROCEDURE — 77063 BREAST TOMOSYNTHESIS BI: CPT | Mod: TC

## 2018-09-19 PROCEDURE — 77067 SCR MAMMO BI INCL CAD: CPT | Mod: TC

## 2018-12-05 ENCOUNTER — E-VISIT (OUTPATIENT)
Dept: PEDIATRICS | Facility: CLINIC | Age: 43
End: 2018-12-05

## 2018-12-05 ENCOUNTER — TELEPHONE (OUTPATIENT)
Dept: PEDIATRICS | Facility: CLINIC | Age: 43
End: 2018-12-05

## 2018-12-05 DIAGNOSIS — F32.9 MAJOR DEPRESSIVE DISORDER WITH CURRENT ACTIVE EPISODE, UNSPECIFIED DEPRESSION EPISODE SEVERITY, UNSPECIFIED WHETHER RECURRENT: Primary | ICD-10-CM

## 2018-12-05 PROCEDURE — 99207 ZZC NO BILLABLE SERVICE THIS VISIT: CPT | Performed by: INTERNAL MEDICINE

## 2018-12-05 ASSESSMENT — ANXIETY QUESTIONNAIRES
2. NOT BEING ABLE TO STOP OR CONTROL WORRYING: NEARLY EVERY DAY
GAD7 TOTAL SCORE: 12
5. BEING SO RESTLESS THAT IT IS HARD TO SIT STILL: NOT AT ALL
7. FEELING AFRAID AS IF SOMETHING AWFUL MIGHT HAPPEN: NEARLY EVERY DAY
6. BECOMING EASILY ANNOYED OR IRRITABLE: NOT AT ALL
GAD7 TOTAL SCORE: 12
7. FEELING AFRAID AS IF SOMETHING AWFUL MIGHT HAPPEN: NEARLY EVERY DAY
GAD7 TOTAL SCORE: 12
1. FEELING NERVOUS, ANXIOUS, OR ON EDGE: NEARLY EVERY DAY
3. WORRYING TOO MUCH ABOUT DIFFERENT THINGS: NEARLY EVERY DAY
4. TROUBLE RELAXING: NOT AT ALL

## 2018-12-05 ASSESSMENT — PATIENT HEALTH QUESTIONNAIRE - PHQ9
10. IF YOU CHECKED OFF ANY PROBLEMS, HOW DIFFICULT HAVE THESE PROBLEMS MADE IT FOR YOU TO DO YOUR WORK, TAKE CARE OF THINGS AT HOME, OR GET ALONG WITH OTHER PEOPLE: VERY DIFFICULT
SUM OF ALL RESPONSES TO PHQ QUESTIONS 1-9: 6
SUM OF ALL RESPONSES TO PHQ QUESTIONS 1-9: 7
SUM OF ALL RESPONSES TO PHQ QUESTIONS 1-9: 6

## 2018-12-05 NOTE — TELEPHONE ENCOUNTER
Please call patient ASAP.  She submitted an evisit for depression with suicidal ideation.  This is not an appropriate evisit.  She needs evaluation today for intent and plan for SI, and either schedule an in person visit or I can do a phone visit after 5 pm today.    Anai Dorantes MD

## 2018-12-05 NOTE — TELEPHONE ENCOUNTER
Spoke with the pt.  She states that has been feeling depressed and overwhelmed the past couple of days.  She has tried to manage these symptoms with self care techniques: exercise, eating healthy, and sleeping, with no improvement.    She is still able to carry out day to day obligations and work.  She is feeling overwhelmed and then becomes increasingly tearful.  She has been needing to support and advocate for her son in making appt's,  and is experiencing difficulty in doing this.  She did schedule an appt at the end of the month with a therapist.  The pt has a history of anxiety, but not depression.    Denies respiratory distress, chest pain, abdominal pain, suicide ideations, plan to harm self, plan to harm others, new onset of confusion or delusional thinking.  Pt requesting an appt.  Scheduled appt for pt on 12/6/18 at 0900, with the verbal contract/ agreement, that she will not harm herself.      Frances Matamoros RN

## 2018-12-06 ENCOUNTER — OFFICE VISIT (OUTPATIENT)
Dept: PEDIATRICS | Facility: CLINIC | Age: 43
End: 2018-12-06
Payer: COMMERCIAL

## 2018-12-06 VITALS
TEMPERATURE: 98.7 F | HEIGHT: 66 IN | SYSTOLIC BLOOD PRESSURE: 120 MMHG | DIASTOLIC BLOOD PRESSURE: 80 MMHG | HEART RATE: 70 BPM | BODY MASS INDEX: 38.01 KG/M2 | OXYGEN SATURATION: 97 % | WEIGHT: 236.5 LBS

## 2018-12-06 DIAGNOSIS — F33.1 MODERATE EPISODE OF RECURRENT MAJOR DEPRESSIVE DISORDER (H): Primary | ICD-10-CM

## 2018-12-06 PROCEDURE — 99214 OFFICE O/P EST MOD 30 MIN: CPT | Performed by: INTERNAL MEDICINE

## 2018-12-06 RX ORDER — ESCITALOPRAM OXALATE 20 MG/1
TABLET ORAL
Qty: 30 TABLET | Refills: 0 | Status: SHIPPED | OUTPATIENT
Start: 2018-12-06 | End: 2018-12-13

## 2018-12-06 ASSESSMENT — ANXIETY QUESTIONNAIRES
2. NOT BEING ABLE TO STOP OR CONTROL WORRYING: NEARLY EVERY DAY
6. BECOMING EASILY ANNOYED OR IRRITABLE: SEVERAL DAYS
1. FEELING NERVOUS, ANXIOUS, OR ON EDGE: NEARLY EVERY DAY
GAD7 TOTAL SCORE: 14
3. WORRYING TOO MUCH ABOUT DIFFERENT THINGS: NEARLY EVERY DAY
5. BEING SO RESTLESS THAT IT IS HARD TO SIT STILL: NOT AT ALL
7. FEELING AFRAID AS IF SOMETHING AWFUL MIGHT HAPPEN: NEARLY EVERY DAY

## 2018-12-06 ASSESSMENT — PATIENT HEALTH QUESTIONNAIRE - PHQ9
SUM OF ALL RESPONSES TO PHQ QUESTIONS 1-9: 8
5. POOR APPETITE OR OVEREATING: SEVERAL DAYS

## 2018-12-06 NOTE — PATIENT INSTRUCTIONS
Nice to meet you today - I'm really glad that you came in.     I think your mind is telling us that we need to slow down and focus on taking care of you.   Give yourself sorin to focus on taking care of yourself, your son, and things that give you josh. Okay to say no to other things or delagate.     - start lexapro  - takes 4-6 weeks to see an effect  - take with food, if you have GI side effects these usually go away in 1-2 weeks  - referral for therapy (CBT). [facility] should call you to help schedule. You can also call them (# below).     - sleep  - healthy food  - exercise  ---> all giving your brain good fuel to heal    Phone visit to check in in 1 week, plan for office visit in 1 month.     The [facility] Crisis Response Unit (CRU) provides 24-hour phone and face-to-face crisis intervention and consultation. Call [phone].   Other crisis resources:   Crisis Connection (24-hour hotline for mental health): [phone]   [facility] Hotline for Youth Crisis and Suicide: 7-574-YDAZBSP (1-395.636.5221)

## 2018-12-06 NOTE — PROGRESS NOTES
"  SUBJECTIVE:   Kaleigh Nam is a 43 year old female who presents to clinic today for the following health issues:    Abnormal Mood Symptoms    Duration: years but in the last 2 weeks things have becoming unmanageable     Description:  Depression: YES  Anxiety: YES  Panic attacks: YES     Accompanying signs and symptoms: see PHQ-9 and SAMUEL scores    History (similar episodes/previous evaluation): Yes, about 15 years was on medication then but was dx with hypothyroidism, doesn't remember if meds helped    Precipitating or alleviating factors: Stressors     Therapies tried and outcome: Therapy, meds about 15 years ago    PHQ-9 SCORE 12/5/2018 12/5/2018   PHQ-9 Total Score MyChart - 6 (Mild depression)   PHQ-9 Total Score 6 7     SAMUEL-7 SCORE 5/29/2015 12/5/2018   Total Score 15 -   Total Score - 12 (moderate anxiety)   Total Score - 12     Here today to talk about depression.    She feels like things have been building for months but the last few weeks they have just overfllown.  In general she is a slightly anxious person.  She has a 21-year-old son who is autistic and special needs.  She recently found out that his day program will be ending and they are working to figure out what comes next.  She is spending lots of time trying to navigate the system.  His father is not as helpful.  He will take him but does not adjust his work schedule, does not do any of the phone calls, etc.    She has good social support from girlfriend and her friends and sister.    She denies any SI or HI.  She does sometimes wish that things were just easier.  She does not have any guns in the home.    She works as a NICU nurse and overall think she is still functioning okay.  She is double checking everything at work that she can.  She has the next week off of work.  She works straight day hours.    Had a therapist and \"let this go\" 2 months ago. This person has been helpful as she also has a special needs child however it is not CBT. " She did have CBT several years ago and has been recalling some of these skills. She would be interested in working with someone else with CBT.    14-15 years ago had MH issues, son was nonverbal and it was a lot of stress  - recalls being on Lexapro and seroquel - the lexapro was helpful.  - Was having obsessive thoughts - not struggling with that now    Not sleeping great but only waking up some nights. Not having a lot of trouble going to sleep.  Did used to take trazodone - didn't help her stay asleep    Working on exercise (kickboxing), good nutrition, and sleep.    Problem list and histories reviewed & adjusted, as indicated.  Additional history: as documented    Patient Active Problem List   Diagnosis     CARDIOVASCULAR SCREENING; LDL GOAL LESS THAN 160     Hypothyroidism, postsurgical     Family history of diabetes mellitus     Obesity (BMI 30-39.9)     Vitamin D deficiency     Patellofemoral pain syndrome of right knee     Past Surgical History:   Procedure Laterality Date     C  DELIVERY ONLY      , Low Cervical     C  DELIVERY ONLY      , Low Cervical     C  DELIVERY ONLY      , Low Cervical     C THYROIDECTOMY         Social History   Substance Use Topics     Smoking status: Never Smoker     Smokeless tobacco: Never Used     Alcohol use 1.8 oz/week     3 Standard drinks or equivalent per week      Comment: 1 month     Family History   Problem Relation Age of Onset     Thyroid Disease Mother      Other - See Comments Mother      prediabetes     Osteoporosis Mother      Hypertension Sister      Diabetes Sister      Hypertension Sister      Congenital Anomalies Son      autism, cognitive delay         Current Outpatient Prescriptions   Medication Sig Dispense Refill     cholecalciferol (VITAMIN D3) 46034 UNITS capsule Take 1 capsule (50,000 Units) by mouth once a week 8 capsule 0     escitalopram (LEXAPRO) 20 MG tablet Take 1/2 tablet (10 mg) for 1-2  "weeks, then increase to 1 tablet orally daily 30 tablet 0     levothyroxine (SYNTHROID/LEVOTHROID) 137 MCG tablet Take 1 tablet (137 mcg) by mouth daily 90 tablet 3     loratadine (CLARITIN) 10 MG tablet Take 10 mg by mouth daily as needed.       metroNIDAZOLE 0.75 % LOTN Apply to affected area on face in the am x 4-6 weeks (Patient not taking: Reported on 9/7/2018) 59 mL 11     tacrolimus (PROTOPIC) 0.03 % ointment Apply to affected area at night x 4-6 weeks (Patient not taking: Reported on 9/7/2018) 60 g 1     Allergies   Allergen Reactions     No Known Drug Allergies        Reviewed and updated as needed this visit by clinical staff  Tobacco  Allergies  Meds       Reviewed and updated as needed this visit by Provider         ROS:  Constitutional, HEENT, cardiovascular, pulmonary, gi and gu systems are negative, except as otherwise noted.    OBJECTIVE:     /80  Pulse 70  Temp 98.7  F (37.1  C) (Oral)  Ht 5' 5.5\" (1.664 m)  Wt 236 lb 8 oz (107.3 kg)  SpO2 97%  Breastfeeding? No  BMI 38.76 kg/m2  Body mass index is 38.76 kg/(m^2).  GENERAL: healthy, alert and no distress  PSYCH: mentation appears normal, very tearful crying throughout visit, no active si/hi    Diagnostic Test Results:  Reviewed last normal TSH in Sept 2018, no dose change    ASSESSMENT/PLAN:       ICD-10-CM    1. Moderate episode of recurrent major depressive disorder (H) F33.1 escitalopram (LEXAPRO) 20 MG tablet     MENTAL HEALTH REFERRAL  - Adult; Outpatient Treatment; Individual/Couples/Family/Group Therapy/Health Psychology; Other: [facility] [phone]; We will contact you to schedule the appointment or please call with any questi...     Certainly with acute stress but depression/anxiety has been building over the past few months.  She is doing a great job with working on sleep, exercise, and diet.   Will add in therapy (felt like CBT was most helpful, current therapist doesn't do this, will refer) and " restart lexapro. Considered restarting trazodone for sleep but isn't having too much difficulty going to sleep and want to start one medication at a time. See PI for counseling.   Discussed taking a leave from work to do an intensive outpatient program but she didn't feel this was necessary at this point. She thinks the week off of work now will be enough of a break.   At the end of our visit she remarked she was relived that there was a plan.     Will f/u with a phone visit in 1 week, plan for OV 1 month after starting lexapro.     Patient Instructions   Nice to meet you today - I'm really glad that you came in.     I think your mind is telling us that we need to slow down and focus on taking care of you.   Give yourself sorin to focus on taking care of yourself, your son, and things that give you josh. Okay to say no to other things or delagate.     - start lexapro  - takes 4-6 weeks to see an effect  - take with food, if you have GI side effects these usually go away in 1-2 weeks  - referral for therapy (CBT). [facility] should call you to help schedule. You can also call them (# below).     - sleep  - healthy food  - exercise  ---> all giving your brain good fuel to heal    Phone visit to check in in 1 week, plan for office visit in 1 month.     The [facility] Crisis Response Unit (CRU) provides 24-hour phone and face-to-face crisis intervention and consultation. Call [phone].   Other crisis resources:   Crisis Connection (24-hour hotline for mental health): 100.170.6642   [facility] Hotline for Youth Crisis and Suicide: 5-383-PMAIQSZ (1-630.575.8058)        Dusty Bang MD  [facility]

## 2018-12-06 NOTE — MR AVS SNAPSHOT
After Visit Summary   12/6/2018    Kaleigh Nam    MRN: [id number]           Patient Information     Date Of Birth          1975        Visit Information        Provider Department      12/6/2018 1:30 PM Dusty Bang MD [facility] [place]        Today's Diagnoses     Severe episode of recurrent major depressive disorder, without psychotic features (H)    -  1      Care Instructions    Nice to meet you today - I'm really glad that you came in.     I think your mind is telling us that we need to slow down and focus on taking care of you.   Give yourself sorin to focus on taking care of yourself, your son, and things that give you josh. Okay to say no to other things or delagate.     - start lexapro  - takes 4-6 weeks to see an effect  - take with food, if you have GI side effects these usually go away in 1-2 weeks  - referral for therapy (CBT). Behavioral Healthcare Providers should call you to help schedule. You can also call them (# below).     - sleep  - healthy food  - exercise  ---> all giving your brain good fuel to heal    Phone visit to check in in 1 week, plan for office visit in 1 month.     The [facility] Crisis Response Unit (CRU) provides 24-hour phone and face-to-face crisis intervention and consultation. Call [phone].   Other crisis resources:   Crisis Connection (24-hour hotline for mental health): 580.832.3373   [facility] Hotline for Youth Crisis and Suicide: 7-702-YYHWAQI (1-532.608.2673)            Follow-ups after your visit        Additional Services     MENTAL HEALTH REFERRAL  - Adult; Outpatient Treatment; Individual/Couples/Family/Group Therapy/Health Psychology; Other: Behavioral Healthcare Providers [phone]; We will contact you to schedule the appointment or please call with any questi...       All scheduling is subject to the client's specific insurance plan & benefits, provider/location availability, and provider clinical  specialities.  Please arrive 15 minutes early for your first appointment and bring your completed paperwork.    Please be aware that coverage of these services is subject to the terms and limitations of your health insurance plan.  Call member services at your health plan with any benefit or coverage questions.                            Follow-up notes from your care team     Return in about 7 weeks (around 1/24/2019) for Mental Health Follow Up.      Your next 10 appointments already scheduled     Dec 13, 2018 11:30 AM CST   Telephone Visit with Dusty Bang MD   [facility]    [address] [place] [address]  [address]  [place] [address] [phone]           Note: this is not an onsite visit; there is no need to come to the facility.            Panda 10, 2019 11:10 AM CST   SHORT with Dusty Bang MD   [facility] ([facility])    [address] [place] [address]  [address]  [place] [address] [phone]              Who to contact     If you have questions or need follow up information about today's clinic visit or your schedule please contact [facility] directly at [phone].  Normal or non-critical lab and imaging results will be communicated to you by MyChart, letter or phone within 4 business days after the clinic has received the results. If you do not hear from us within 7 days, please contact the clinic through MyChart or phone. If you have a critical or abnormal lab result, we will notify you by phone as soon as possible.  Submit refill requests through [employer] or call your pharmacy and they will forward the refill request to us. Please allow 3 business days for your refill to be completed.          Additional Information About Your Visit        [employer] Information     [employer] gives you secure access to your electronic health record. If you see a primary care provider, you can also send messages to  "your care team and make appointments. If you have questions, please call your primary care clinic.  If you do not have a primary care provider, please call [phone] and they will assist you.        Care EveryWhere ID     This is your Care EveryWhere ID. This could be used by other organizations to access your [place] medical records  [id number]        Your Vitals Were     Pulse Temperature Height Pulse Oximetry Breastfeeding? BMI (Body Mass Index)    70 98.7  F (37.1  C) (Oral) 5' 5.5\" (1.664 m) 97% No 38.76 kg/m2       Blood Pressure from Last 3 Encounters:   12/06/18 120/80   09/07/18 118/78   07/19/18 114/78    Weight from Last 3 Encounters:   12/06/18 236 lb 8 oz (107.3 kg)   09/07/18 231 lb 11.2 oz (105.1 kg)   07/19/18 227 lb (103 kg)              We Performed the Following     MENTAL HEALTH REFERRAL  - Adult; Outpatient Treatment; Individual/Couples/Family/Group Therapy/Health Psychology; Other: Behavioral Healthcare Providers [phone]; We will contact you to schedule the appointment or please call with any questi...          Today's Medication Changes          These changes are accurate as of 12/6/18  2:17 PM.  If you have any questions, ask your nurse or doctor.               Start taking these medicines.        Dose/Directions    escitalopram 20 MG tablet   Commonly known as:  LEXAPRO   Used for:  Severe episode of recurrent major depressive disorder, without psychotic features (H)   Started by:  Dusty Bang MD        Take 1/2 tablet (10 mg) for 1-2 weeks, then increase to 1 tablet orally daily   Quantity:  30 tablet   Refills:  0            Where to get your medicines      These medications were sent to [facility] - [address]   [address], [place] [address]     Phone:  [phone]     escitalopram 20 MG tablet                Primary Care Provider Office Phone # Fax #    Anai Dorantes [phone] " [phone]       [address] [place] [address]        Equal Access to Services     [facility] : Hadii aad ku hadkumaraidan Almamelida, waaxda lumosheadaha, qaybta vestaaryada radha, góemz nashberthalan wen. So [facility] [phone].    ATENCIÓN: Si habla español, tiene a nina disposición servicios gratuitos de asistencia lingüística. Johnnyame al [phone].    We comply with applicable federal civil rights laws and [place] laws. We do not discriminate on the basis of race, color, national origin, age, disability, sex, sexual orientation, or gender identity.            Thank you!     Thank you for choosing [facility]  for your care. Our goal is always to provide you with excellent care. Hearing back from our patients is one way we can continue to improve our services. Please take a few minutes to complete the written survey that you may receive in the mail after your visit with us. Thank you!             Your Updated Medication List - Protect others around you: Learn how to safely use, store and throw away your medicines at www.disposemymeds.org.          This list is accurate as of 12/6/18  2:17 PM.  Always use your most recent med list.                   Brand Name Dispense Instructions for use Diagnosis    CLARITIN 10 MG tablet   Generic drug:  loratadine      Take 10 mg by mouth daily as needed.        escitalopram 20 MG tablet    LEXAPRO    30 tablet    Take 1/2 tablet (10 mg) for 1-2 weeks, then increase to 1 tablet orally daily    Severe episode of recurrent major depressive disorder, without psychotic features (H)       levothyroxine 137 MCG tablet    SYNTHROID/LEVOTHROID    90 tablet    Take 1 tablet (137 mcg) by mouth daily    Hypothyroidism, postsurgical       metroNIDAZOLE 0.75 % external lotion    METROLOTION    59 mL    Apply to affected area on face in the am x 4-6 weeks    Dermatitis, perioral       tacrolimus 0.03 % external ointment    PROTOPIC    60 g    Apply to  affected area at night x 4-6 weeks    Dermatitis, perioral       vitamin D3 52080 units capsule    CHOLECALCIFEROL    8 capsule    Take 1 capsule (50,000 Units) by mouth once a week    Vitamin D deficiency

## 2018-12-07 ASSESSMENT — PATIENT HEALTH QUESTIONNAIRE - PHQ9: SUM OF ALL RESPONSES TO PHQ QUESTIONS 1-9: 6

## 2018-12-07 ASSESSMENT — ANXIETY QUESTIONNAIRES
GAD7 TOTAL SCORE: 12
GAD7 TOTAL SCORE: 14

## 2018-12-10 PROBLEM — F33.1 MODERATE EPISODE OF RECURRENT MAJOR DEPRESSIVE DISORDER (H): Status: ACTIVE | Noted: 2018-12-10

## 2018-12-10 PROBLEM — F33.1 MODERATE EPISODE OF RECURRENT MAJOR DEPRESSIVE DISORDER (H): Status: ACTIVE | Noted: 2018-12-06

## 2018-12-13 ENCOUNTER — VIRTUAL VISIT (OUTPATIENT)
Dept: PEDIATRICS | Facility: CLINIC | Age: 43
End: 2018-12-13
Payer: COMMERCIAL

## 2018-12-13 DIAGNOSIS — F33.1 MODERATE EPISODE OF RECURRENT MAJOR DEPRESSIVE DISORDER (H): Primary | ICD-10-CM

## 2018-12-13 DIAGNOSIS — F41.9 ANXIETY: ICD-10-CM

## 2018-12-13 PROCEDURE — 99441 ZZC PHYSICIAN TELEPHONE EVALUATION 5-10 MIN: CPT | Performed by: INTERNAL MEDICINE

## 2018-12-13 RX ORDER — ESCITALOPRAM OXALATE 20 MG/1
20 TABLET ORAL DAILY
Qty: 90 TABLET | Refills: 0 | Status: SHIPPED | OUTPATIENT
Start: 2018-12-13 | End: 2019-03-20

## 2018-12-13 ASSESSMENT — ANXIETY QUESTIONNAIRES
1. FEELING NERVOUS, ANXIOUS, OR ON EDGE: SEVERAL DAYS
3. WORRYING TOO MUCH ABOUT DIFFERENT THINGS: NOT AT ALL
2. NOT BEING ABLE TO STOP OR CONTROL WORRYING: SEVERAL DAYS
GAD7 TOTAL SCORE: 3
5. BEING SO RESTLESS THAT IT IS HARD TO SIT STILL: NOT AT ALL
7. FEELING AFRAID AS IF SOMETHING AWFUL MIGHT HAPPEN: SEVERAL DAYS
6. BECOMING EASILY ANNOYED OR IRRITABLE: NOT AT ALL

## 2018-12-13 ASSESSMENT — PATIENT HEALTH QUESTIONNAIRE - PHQ9
5. POOR APPETITE OR OVEREATING: NOT AT ALL
SUM OF ALL RESPONSES TO PHQ QUESTIONS 1-9: 2

## 2018-12-13 NOTE — PROGRESS NOTES
"Kaleigh Nam is a 43 year old female who is being evaluated via a telephone visit.      The patient has been notified of following (by JB Fernández.     \"We have found that certain health care needs can be provided without the need for a physical exam.  This service lets us provide the care you need with a short phone conversation.  If a prescription is necessary we can send it directly to your pharmacy.  If lab work is needed we can place an order for that and you can then stop by our lab to have the test done at a later time.    This telephone visit will be conducted via 3 way call with the you (the patient) , the physician/provider, and a me all on the line at the same time.  This allows your physician/provider to have the phone conversation with you while I will be taking notes for your medical record.  We will have full access to your [place] medical record during this entire phone call.    Since this is like an office visit,  will bill your insurance company for this service.  Please check with your medical insurance if this type of telephone/virtual is covered . You may be responsible for the cost of this service if insurance coverage is denied.  The typical cost is $30 (10min), $59(11-20min) and $85 (21-30min)     If during the course of the call the physician/provider feels a telephone visit is not appropriate, you will not be charged for this service\"    Consent has been obtained for this service by care team member: yes.  See the scanned image in the medical record.    SUBJECTIVE:   Kaleigh Nam is a 43 year old female who presents to clinic today for the following health issues:      Depression and Anxiety Follow-Up    Status since last visit: Improved     Other associated symptoms:None    Complicating factors:     Significant life event: No     Current substance abuse: None    PHQ-9 SCORE 12/5/2018 12/6/2018 12/13/2018   PHQ-9 Total Score MyChart 6 (Mild depression) - -   PHQ-9 Total " Score 7 8 2     SAMUEL-7 SCORE 12/5/2018 12/6/2018 12/13/2018   Total Score - - -   Total Score 12 (moderate anxiety) - -   Total Score 12 14 3     PHQ-9  English  PHQ-9   Any Language  SAMUEL-7  Suicide Assessment Five-step Evaluation and Treatment (SAFE-T)    Problems taking medications regularly: No    Medication side effects: none    ===================================================    I have reviewed the note as documented above.  This accurately captures the substance of my conversation with the patient Kaleigh Nam.    Everything feels a little bit less hopeless. She's really happy with how things have turned around.    Felt like medication started working right away. Just went up to full tab of lexapro yesterday.  Had a big meeting about her son's health yesterday and made it through without crying. This was huge to her- first time she felt in control of her emotions in some time.  Goes back to work tomorrow - feels like it will be okay.    Saw her old therapist on Monday, has another appointment next week. Didn't feel the [facility] therapist was going to be a good fit. Will ask her therapist about CBT.    Waking up in the middle of the night 2-3 nights a week and does have trouble going back to sleep.     Still finding time for kickboxing, walking. Added in some yoga.     Assessment/Plan:  (F33.1) Moderate episode of recurrent major depressive disorder (H)  (primary encounter diagnosis)  (F41.9) Anxiety  Comment: Doing much better than 1 week ago. Feeling more in control of her emotions. Feels like the lexapro is helping though it has only been a week; working with therapist weekly. No SI/HI. Will continue lexapro for now. Consider addition of trazodone if still having trouble with sleep. Continue focus on wellness - yoga, diet, exercise, etc. F/u in 1 month or sooner if needed.  Plan: escitalopram (LEXAPRO) 20 MG tablet    Total time of call between patient and provider was 9 minutes     SUREKHA He  MD [facility]  Internal Medicine-Pediatrics

## 2018-12-14 ASSESSMENT — ANXIETY QUESTIONNAIRES: GAD7 TOTAL SCORE: 3

## 2019-01-18 ENCOUNTER — OFFICE VISIT (OUTPATIENT)
Dept: PODIATRY | Facility: CLINIC | Age: 44
End: 2019-01-18
Payer: COMMERCIAL

## 2019-01-18 ENCOUNTER — ANCILLARY PROCEDURE (OUTPATIENT)
Dept: GENERAL RADIOLOGY | Facility: CLINIC | Age: 44
End: 2019-01-18
Payer: COMMERCIAL

## 2019-01-18 VITALS — SYSTOLIC BLOOD PRESSURE: 118 MMHG | WEIGHT: 234.6 LBS | BODY MASS INDEX: 38.45 KG/M2 | DIASTOLIC BLOOD PRESSURE: 78 MMHG

## 2019-01-18 DIAGNOSIS — M79.671 RIGHT FOOT PAIN: ICD-10-CM

## 2019-01-18 DIAGNOSIS — M21.41 PES PLANUS OF BOTH FEET: ICD-10-CM

## 2019-01-18 DIAGNOSIS — M21.42 PES PLANUS OF BOTH FEET: ICD-10-CM

## 2019-01-18 DIAGNOSIS — G57.61 MORTON'S NEUROMA, RIGHT: ICD-10-CM

## 2019-01-18 DIAGNOSIS — M79.671 RIGHT FOOT PAIN: Primary | ICD-10-CM

## 2019-01-18 PROCEDURE — 64455 NJX AA&/STRD PLTR COM DG NRV: CPT | Mod: RT | Performed by: PODIATRIST

## 2019-01-18 PROCEDURE — 99203 OFFICE O/P NEW LOW 30 MIN: CPT | Mod: 25 | Performed by: PODIATRIST

## 2019-01-18 PROCEDURE — 73630 X-RAY EXAM OF FOOT: CPT | Mod: RT

## 2019-01-18 ASSESSMENT — PAIN SCALES - GENERAL: PAINLEVEL: MODERATE PAIN (5)

## 2019-01-18 NOTE — PROGRESS NOTES
PATIENT HISTORY:  Kaleigh Nam is a 43 year old female who presents to clinic for pain to the outside of the right foot. Notes that she has had it for about 3 months. Denies injury. Pain is 6/10, usually after she has been on it for a while. Will ache, and stab or cause shooting pain. Has tired icing and stretching without relief. Wondering what is causing the pain and what can be done about it.     Review of Systems:  Patient denies fever, chills, rash, wound, stiffness, numbness, weakness, heart burn, blood in stool, chest pain with activity, calf pain when walking, shortness of breath with activity, chronic cough, easy bleeding/bruising, swelling of ankles, excessive thirst, fatigue, depression, anxiety.  Patient admits to limping at times. .     PAST MEDICAL HISTORY:   Past Medical History:   Diagnosis Date     Allergic rhinitis, cause unspecified      Anxiety state, unspecified      Graves's Disease      Multinodular Goiter      Severe pre-eclampsia, unspecified as to episode of care         PAST SURGICAL HISTORY:   Past Surgical History:   Procedure Laterality Date     C  DELIVERY ONLY      , Low Cervical     C  DELIVERY ONLY      , Low Cervical     C  DELIVERY ONLY      , Low Cervical     C THYROIDECTOMY          MEDICATIONS:   Current Outpatient Medications:      cholecalciferol (VITAMIN D3) 69515 UNITS capsule, Take 1 capsule (50,000 Units) by mouth once a week, Disp: 8 capsule, Rfl: 0     escitalopram (LEXAPRO) 20 MG tablet, Take 1 tablet (20 mg) by mouth daily, Disp: 90 tablet, Rfl: 0     levothyroxine (SYNTHROID/LEVOTHROID) 137 MCG tablet, Take 1 tablet (137 mcg) by mouth daily, Disp: 90 tablet, Rfl: 3     loratadine (CLARITIN) 10 MG tablet, Take 10 mg by mouth daily as needed., Disp: , Rfl:      metroNIDAZOLE 0.75 % LOTN, Apply to affected area on face in the am x 4-6 weeks (Patient not taking: Reported on 2018), Disp: 59 mL, Rfl:  11     tacrolimus (PROTOPIC) 0.03 % ointment, Apply to affected area at night x 4-6 weeks (Patient not taking: Reported on 2018), Disp: 60 g, Rfl: 1     ALLERGIES:    Allergies   Allergen Reactions     No Known Drug Allergies         SOCIAL HISTORY:   Social History     Socioeconomic History     Marital status:      Spouse name: Not on file     Number of children: Not on file     Years of education: Not on file     Highest education level: Not on file   Social Needs     Financial resource strain: Not on file     Food insecurity - worry: Not on file     Food insecurity - inability: Not on file     Transportation needs - medical: Not on file     Transportation needs - non-medical: Not on file   Occupational History     Not on file   Tobacco Use     Smoking status: Never Smoker     Smokeless tobacco: Never Used   Substance and Sexual Activity     Alcohol use: Yes     Alcohol/week: 1.8 oz     Types: 3 Standard drinks or equivalent per week     Comment: 1 month     Drug use: No     Sexual activity: Yes     Partners: Male     Birth control/protection: Surgical   Other Topics Concern     Parent/sibling w/ CABG, MI or angioplasty before 65F 55M? No   Social History Narrative    , .  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          FAMILY HISTORY:   Family History   Problem Relation Age of Onset     Thyroid Disease Mother      Other - See Comments Mother         prediabetes     Osteoporosis Mother      Hypertension Sister      Diabetes Sister      Hypertension Sister      Congenital Anomalies Son         autism, cognitive delay        EXAM:Vitals: Wt 106.4 kg (234 lb 9.6 oz)   BMI 38.45 kg/m    BMI= Body mass index is 38.45 kg/m .    General appearance: Patient is alert and fully cooperative with history & exam.  No sign of distress is noted during the visit.     Psychiatric: Affect is pleasant & appropriate.  Patient appears motivated to improve health.     Respiratory: Breathing is regular &  unlabored while sitting.     HEENT: Hearing is intact to spoken word.  Speech is clear.  No gross evidence of visual impairment that would impact ambulation.     Dermatologic: Skin is intact to both lower extremities without significant lesions, rash or abrasion.  No paronychia or evidence of soft tissue infection is noted.     Vascular: DP & PT pulses are intact & regular bilaterally.  No significant edema or varicosities noted.  CFT and skin temperature is normal to both lower extremities.     Neurologic: Lower extremity sensation is intact to light touch.  No evidence of weakness or contracture in the lower extremities.  No evidence of neuropathy.     Musculoskeletal: Patient is ambulatory without assistive device or brace.  Pain on palpation of the right 3rd intermetatarsal space and with lateral compression of the foot. Decrease arch height.     Radiographs:  I personally reviewed the left footxrays. - no fractures noted. Decrease calcaneal inclination angle. Minimal arthritic changes to midfoot.      ASSESSMENT:    Right foot pain  Tang's neuroma, right  Pes planus of both feet     PLAN:  Reviewed patient[facility] in [facility]. Reviewed xrays.  We discussed causes and treatments of neuromas.  These included shoe gear, orthotics, metatarsal pads, cortisone injections, ice, physical therapy, and possible surgical removal.  Patient would like to try injection today.. Also recommended orthotics, nsaids and topical pain cream. If pain continues, recommend MRI.     Procedure:  After verbal consent, the right 3rd inter metatarsal space was marked out.  The foot was prepped and draped using sterile technique.  A mixture of 1cc of 2% lidocaine plain and 1 1/2 cc of kenalog -40 was injected into the max point of tenderness.  Patient tolerated procedure and anesthesia well.        Brittani Reed DPM, Podiatry/Foot and Ankle Surgery    Weight management plan: Patient was referred to their PCP to discuss a diet and exercise  plan.    Patient to follow up with Primary Care provider regarding elevated blood pressure.

## 2019-01-18 NOTE — PATIENT INSTRUCTIONS
Thank you for choosing [facility]!    DR. BULLARD'S CLINIC SCHEDULE  MONDAY AM - [place] TUESDAY - [place]   [address] [address] [place] [address] [address] [place] [address]   [phone] / [phone] / [phone]       WEDNESDAY - [place] FRIDAY AM - WOUND CENTER   [address]   [place] [address] [place] [address] [address]   [phone] / [phone]       FRIDAY PM - [place] SCHEDULE SURGERY: [phone]   [address] BILLING QUESTIONS: [phone]   [place] [address] AFTER HOURS: [phone]   [phone] / [phone] APPOINTMENTS: [phone]     Consumer Price Line (CPL) [phone]       TANG'S NEUROMA   Tang's neuroma is an enlargement or thickening of a nerve in the foot. It is also sometimes referred to as an intermetatarsal neuroma, interdigital neuroma, Tang's metatarsalgia (pain in the metatarsal head area), dominique-neural fibrosis (scar tissue around a nerve) or entrapment neuropathy (abnormal nerve due to compression). A Tang's neuroma most commonly occurs in the third interspace between the third and fourth toes, followed by the second interspace between the second and third toes. Tang's neuromas have also occurred in the fourth and first interspaces, but these are rare. If you have a Tang's neuroma, there is a 15% chance it will occur bilaterally (on both feet). Tang's neuromas occur most commonly in women who are between 30 to 50 years old. The reason they are more common in women is thought to be due to the shoes women wear.   CAUSES: A Tang's neuroma is thought to be caused by trauma to the nerve, but scientists are still not sure about the exact cause of the trauma. The trauma may be caused by the metatarsal heads, the deep transverse intermetatarsal ligament (holds the metatarsal heads together) or an intermetatarsal bursa (fluid-filled sac).  "All of these structures can cause compression/trauma on the nerve which initially causes swelling and injury in the nerve. Over time if the compression/trauma continues, the nerve repairs itself with very fibrous tissue that leads to enlargement and thickening of the nerve. Other causes of trauma to the nerve may include; overpronation (foot rolls inward), hypermobility (too much motion), cavo varus (high arch foot) and excessive dorsiflexion (toes bend upward) of the toes. These biomechanical (howthe foot moves) factors may cause trauma to the nerve with every step. If the nerve becomes irritated and enlarged then it takes up more space and gets even more compressed and irritated. It becomes a vicious cycle.   SIGNS & SYMPTOMS  - Pain (sharp, stabbing, throbbing, shooting)   - Numbness   - Tingling or \"pins & needles\"   - Burning   - Cramping   - Feeling that you are stepping on something or that something is in your shoe   - Initially the symptoms may happen once in a while, but as the condition gets     worse, the symptoms may happen all of the time   - It usually feels better by taking off your shoe and massaging your foot     DIAGNOSIS: Your podiatrist will ask many questions about your signs and symptoms and will perform a physical exam. Some of the exams may include a web space compression test. This is done by squeezing the metatarsals together with one hand and using the thumb and index finger of the other hand to compress the affected web space to reproduce the pain/symptoms. A palpable click (Ivy's click) is usually present. This test may also cause pain to shoot into the toes and that is called a Tinel's sign. Eva's test involves squeezing the metatarsals together and moving the toes up and down for 30 seconds. This will usually cause pain or it will bring on your other symptoms. Hughes's sign is positive when you stand and the affected toes spread apart. A Tang's neuroma is usually diagnosed " based on the history and physical exam findings, but sometimes other tests such as an x-ray, ultrasound or an MRI are needed.   TREATMENT  1.  Footwear Changes: Wear shoes that are wide and deep in the toe box so they  do not put pressure on your toes and metatarsals. Avoid wearing high heels because they cause increased pressure on the ball of your foot (forefoot).    2.  Metatarsal Pads: These help to lift and separate the metatarsal heads to take pressure off of the nerve. They are placed just behind where you feel the pain, not on top of the painful spot.   3.  Activity modification: For example, you may try swimming instead of running until your symptoms go away.   4.  Taping   5.  Icing   6.  NSAIDs (anti-inflammatories): aleve, ibuprofen, etc.   7.  Arch Supports or Orthotics: These help to control some of the abnormal motion in your feet. The abnormal motion can lead to extra torque and pressure on the nerve.   8.  Physical Therapy  9.  Cortisone Injection: Helps to decrease the size of the irritated, enlarged nerve.   10.  Sclerosing Alcohol Injection: Helps to destroy the nerve chemically, which causes permanent numbness    SURGERY  If conservative treatment does not help surgery may be needed. Surgery may involve cutting out the nerve or cutting the intermetatarsalligament. Studies have shown surgery has an 80-85% success rate.  Will result in numbness    PREVENTION  -Avoid wearing narrow, pointed toe shoes, or high heels      Body Mass Index (BMI)  Many things can cause foot and ankle problems. Foot structure, activity level, foot mechanics and injuries are common causes of pain.  One very important issue that often goes unmentioned, is body weight. Extra weight can cause increased stress on muscles, ligaments, bones and tendons.  Sometimes just a few extra pounds is all it takes to put one over her/his threshold. Without reducing that stress, it can be difficult to alleviate pain. Some people are  uncomfortable addressing this issue, but we feel it is important for you to think about it. As Foot &  Ankle specialists, our job is addressing the lower extremity problem and possible causes. Regarding extra body weight, we encourage patients to discuss diet and weight management plans with their primary care doctors. It is this team approach that gives you the best opportunity for pain relief and getting you back on your feet.

## 2019-01-18 NOTE — LETTER
2019         RE: Kaleigh Nam  [address] [facility] [phone]        Dear Colleague,    Thank you for referring your patient, Kaleigh Nam, to the [facility]. Please see a copy of my visit note below.    PATIENT HISTORY:  Kaleigh Nam is a 43 year old female who presents to clinic for pain to the outside of the right foot. Notes that she has had it for about 3 months. Denies injury. Pain is 6/10, usually after she has been on it for a while. Will ache, and stab or cause shooting pain. Has tired icing and stretching without relief. Wondering what is causing the pain and what can be done about it.     Review of Systems:  Patient denies fever, chills, rash, wound, stiffness, numbness, weakness, heart burn, blood in stool, chest pain with activity, calf pain when walking, shortness of breath with activity, chronic cough, easy bleeding/bruising, swelling of ankles, excessive thirst, fatigue, depression, anxiety.  Patient admits to limping at times. .     PAST MEDICAL HISTORY:   Past Medical History:   Diagnosis Date     Allergic rhinitis, cause unspecified      Anxiety state, unspecified      Graves's Disease      Multinodular Goiter      Severe pre-eclampsia, unspecified as to episode of care         PAST SURGICAL HISTORY:   Past Surgical History:   Procedure Laterality Date     C  DELIVERY ONLY      , Low Cervical     C  DELIVERY ONLY      , Low Cervical     C  DELIVERY ONLY      , Low Cervical     C THYROIDECTOMY          MEDICATIONS:   Current Outpatient Medications:      cholecalciferol (VITAMIN D3) 60331 UNITS capsule, Take 1 capsule (50,000 Units) by mouth once a week, Disp: 8 capsule, Rfl: 0     escitalopram (LEXAPRO) 20 MG tablet, Take 1 tablet (20 mg) by mouth daily, Disp: 90 tablet, Rfl: 0     levothyroxine (SYNTHROID/LEVOTHROID) 137 MCG tablet, Take 1 tablet (137 mcg) by mouth daily, Disp: 90 tablet, Rfl:  3     loratadine (CLARITIN) 10 MG tablet, Take 10 mg by mouth daily as needed., Disp: , Rfl:      metroNIDAZOLE 0.75 % LOTN, Apply to affected area on face in the am x 4-6 weeks (Patient not taking: Reported on 2018), Disp: 59 mL, Rfl: 11     tacrolimus (PROTOPIC) 0.03 % ointment, Apply to affected area at night x 4-6 weeks (Patient not taking: Reported on 2018), Disp: 60 g, Rfl: 1     ALLERGIES:    Allergies   Allergen Reactions     No Known Drug Allergies         SOCIAL HISTORY:   Social History     Socioeconomic History     Marital status:      Spouse name: Not on file     Number of children: Not on file     Years of education: Not on file     Highest education level: Not on file   Social Needs     Financial resource strain: Not on file     Food insecurity - worry: Not on file     Food insecurity - inability: Not on file     Transportation needs - medical: Not on file     Transportation needs - non-medical: Not on file   Occupational History     Not on file   Tobacco Use     Smoking status: Never Smoker     Smokeless tobacco: Never Used   Substance and Sexual Activity     Alcohol use: Yes     Alcohol/week: 1.8 oz     Types: 3 Standard drinks or equivalent per week     Comment: 1 month     Drug use: No     Sexual activity: Yes     Partners: Male     Birth control/protection: Surgical   Other Topics Concern     Parent/sibling w/ CABG, MI or angioplasty before 65F 55M? No   Social History Narrative    , .  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          FAMILY HISTORY:   Family History   Problem Relation Age of Onset     Thyroid Disease Mother      Other - See Comments Mother         prediabetes     Osteoporosis Mother      Hypertension Sister      Diabetes Sister      Hypertension Sister      Congenital Anomalies Son         autism, cognitive delay        EXAM:Vitals: Wt 106.4 kg (234 lb 9.6 oz)   BMI 38.45 kg/m     BMI= Body mass index is 38.45 kg/m .    General appearance: Patient is  alert and fully cooperative with history & exam.  No sign of distress is noted during the visit.     Psychiatric: Affect is pleasant & appropriate.  Patient appears motivated to improve health.     Respiratory: Breathing is regular & unlabored while sitting.     HEENT: Hearing is intact to spoken word.  Speech is clear.  No gross evidence of visual impairment that would impact ambulation.     Dermatologic: Skin is intact to both lower extremities without significant lesions, rash or abrasion.  No paronychia or evidence of soft tissue infection is noted.     Vascular: DP & PT pulses are intact & regular bilaterally.  No significant edema or varicosities noted.  CFT and skin temperature is normal to both lower extremities.     Neurologic: Lower extremity sensation is intact to light touch.  No evidence of weakness or contracture in the lower extremities.  No evidence of neuropathy.     Musculoskeletal: Patient is ambulatory without assistive device or brace.  Pain on palpation of the right 3rd intermetatarsal space and with lateral compression of the foot. Decrease arch height.     Radiographs:  I personally reviewed the left footxrays. - no fractures noted. Decrease calcaneal inclination angle. Minimal arthritic changes to midfoot.      ASSESSMENT:    Right foot pain  Tang's neuroma, right  Pes planus of both feet     PLAN:  Reviewed [facility] in [facility]. Reviewed xrays.  We discussed causes and treatments of neuromas.  These included shoe gear, orthotics, metatarsal pads, cortisone injections, ice, physical therapy, and possible surgical removal.  Patient would like to try injection today.. Also recommended orthotics, nsaids and topical pain cream. If pain continues, recommend MRI.     Procedure:  After verbal consent, the right 3rd inter metatarsal space was marked out.  The foot was prepped and draped using sterile technique.  A mixture of 1cc of 2% lidocaine plain and 1 1/2 cc of kenalog -40 was injected  into the max point of tenderness.  Patient tolerated procedure and anesthesia well.        Brittani Reed DPM, Podiatry/Foot and Ankle Surgery    Weight management plan: Patient was referred to their PCP to discuss a diet and exercise plan.    Patient to follow up with Primary Care provider regarding elevated blood pressure.        Again, thank you for allowing me to participate in the care of your patient.        Sincerely,        Brittani Reed DPM, Podiatry/Foot and Ankle Surgery

## 2019-03-11 PROBLEM — M22.2X1 PATELLOFEMORAL PAIN SYNDROME OF RIGHT KNEE: Status: RESOLVED | Noted: 2018-08-06 | Resolved: 2019-03-11

## 2019-03-19 DIAGNOSIS — F33.1 MODERATE EPISODE OF RECURRENT MAJOR DEPRESSIVE DISORDER (H): ICD-10-CM

## 2019-03-19 NOTE — TELEPHONE ENCOUNTER
"Requested Prescriptions   Pending Prescriptions Disp Refills     escitalopram (LEXAPRO) 20 MG tablet  Last Written Prescription Date:  12/13/2018  Last Fill Quantity: 90 tablet,  # refills: 0   Last office visit: 12/13/2018 with prescribing provider:  Dusty Bang MD    Future Office Visit:     90 tablet 0     Sig: Take 1 tablet (20 mg) by mouth daily    SSRIs Protocol Passed - 3/19/2019  2:13 PM       Passed - PHQ-9 score less than 5 in past 6 months    Please review last PHQ-9 score.   PHQ-9 SCORE 12/5/2018 12/6/2018 12/13/2018   PHQ-9 Total Score MyChart 6 (Mild depression) - -   PHQ-9 Total Score 7 8 2     SAMUEL-7 SCORE 12/5/2018 12/6/2018 12/13/2018   Total Score - - -   Total Score 12 (moderate anxiety) - -   Total Score 12 14 3              Passed - Medication is active on med list       Passed - Patient is age 18 or older       Passed - No active pregnancy on record       Passed - No positive pregnancy test in last 12 months       Passed - Recent (6 mo) or future (30 days) visit within the authorizing provider's specialty    Patient had office visit in the last 6 months or has a visit in the next 30 days with authorizing provider or within the authorizing provider's specialty.  See \"Patient Info\" tab in inbasket, or \"Choose Columns\" in Meds & Orders section of the refill encounter.              "

## 2019-03-20 RX ORDER — ESCITALOPRAM OXALATE 20 MG/1
20 TABLET ORAL DAILY
Qty: 30 TABLET | Refills: 0 | Status: SHIPPED | OUTPATIENT
Start: 2019-03-20 | End: 2019-04-26

## 2019-04-25 NOTE — PROGRESS NOTES
SUBJECTIVE:   Kaleigh Nam is a 44 year old female who presents to clinic today for the following health issues:    Depression Followup    Status since last visit: Improved     See PHQ-9 for current symptoms.  Other associated symptoms: None    Complicating factors:   Significant life event:  No   Current substance abuse:  None  Anxiety or Panic symptoms:  No    PHQ 12/6/2018 12/13/2018 4/26/2019   PHQ-9 Total Score 8 2 1   Q9: Thoughts of better off dead/self-harm past 2 weeks Several days Not at all Not at all   F/U: Thoughts of suicide or self-harm - No -   F/U: Safety concerns - No -     PHQ-9  English  PHQ-9   Any Language  Suicide Assessment Five-step Evaluation and Treatment (SAFE-T)    Amount of exercise or physical activity: less than normal    Problems taking medications regularly: No    Medication side effects: none    Diet: regular (no restrictions)    OV 12/6/18 depression/anxiety -   - start lexapro  - takes 4-6 weeks to see an effect  - take with food, if you have GI side effects these usually go away in 1-2 weeks  - referral for therapy (CBT). Behavioral Healthcare Providers should call you to help schedule. You can also call them (# below).   - sleep  - healthy food  - exercise  ---> all giving your brain good fuel to heal    Phone visit 12/13/18   Doing much better than 1 week ago. Feeling more in control of her emotions. Feels like the lexapro is helping though it has only been a week; working with therapist weekly. No SI/HI. Will continue lexapro for now. Consider addition of trazodone if still having trouble with sleep. Continue focus on wellness - yoga, diet, exercise, etc. F/u in 1 month or sooner if needed.  Plan: escitalopram (LEXAPRO) 20 MG tablet    Has been feeling really good since February.     Things are going really well with her son- autism and cognitive disability. Was able to explain things at the big meeting. Had the right people at the table.     Has been going to  counseling periodically.    Periods have been a little wonky. + weight gain. Hasn't worked out as much - needs to get back to Podiatry to see if she needs another injection in her foot.     Working straight days in the NICU.    Annual visit in September with Dr. Dorantes.    Additional history: as documented    Reviewed  and updated as needed this visit by clinical staff  Tobacco  Allergies  Meds  Med Hx  Surg Hx  Fam Hx  Soc Hx        Reviewed and updated as needed this visit by Provider         Patient Active Problem List   Diagnosis     CARDIOVASCULAR SCREENING; LDL GOAL LESS THAN 160     Hypothyroidism, postsurgical     Family history of diabetes mellitus     BMI > 40.0 (H)     Vitamin D deficiency     Recurrent major depressive disorder, in full remission (H)     Past Surgical History:   Procedure Laterality Date     C  DELIVERY ONLY      , Low Cervical     C  DELIVERY ONLY      , Low Cervical     C  DELIVERY ONLY      , Low Cervical     C THYROIDECTOMY         Social History     Tobacco Use     Smoking status: Never Smoker     Smokeless tobacco: Never Used   Substance Use Topics     Alcohol use: Yes     Alcohol/week: 1.8 oz     Types: 3 Standard drinks or equivalent per week     Comment: 1 month     Family History   Problem Relation Age of Onset     Thyroid Disease Mother      Other - See Comments Mother         prediabetes     Osteoporosis Mother      Hypertension Sister      Diabetes Sister      Hypertension Sister      Congenital Anomalies Son         autism, cognitive delay         Current Outpatient Medications   Medication Sig Dispense Refill     cholecalciferol (VITAMIN D3) 02880 UNITS capsule Take 1 capsule (50,000 Units) by mouth once a week 8 capsule 0     escitalopram (LEXAPRO) 20 MG tablet Take 1 tablet (20 mg) by mouth daily 90 tablet 1     levothyroxine (SYNTHROID/LEVOTHROID) 137 MCG tablet Take 1 tablet (137 mcg) by mouth daily 90  "tablet 3     loratadine (CLARITIN) 10 MG tablet Take 10 mg by mouth daily as needed.       Allergies   Allergen Reactions     No Known Drug Allergies        ROS:  Constitutional, HEENT, cardiovascular, pulmonary, gi and gu systems are negative, except as otherwise noted.    OBJECTIVE:     /68 (BP Location: Right arm, Patient Position: Sitting, Cuff Size: Adult Regular)   Pulse 64   Temp 97.7  F (36.5  C) (Oral)   Ht 1.67 m (5' 5.75\")   Wt 112.6 kg (248 lb 4.8 oz)   LMP 04/01/2019 (Approximate)   SpO2 98%   Breastfeeding? No   BMI 40.38 kg/m    Body mass index is 40.38 kg/m .  GENERAL: healthy, alert and no distress  RESP: lungs clear to auscultation - no rales, rhonchi or wheezes  CV: regular rate and rhythm, normal S1 S2, no S3 or S4, no murmur, click or rub, no peripheral edema and peripheral pulses strong  SKIN: webs of bilateral hands dry and cracking.  PSYCH: mentation appears normal, affect normal/bright    Diagnostic Test Results:  See results note.      ASSESSMENT/PLAN:     1. Recurrent major depressive disorder, in full remission (H)  2. Anxiety  Doing really well. Keep meds the same for now - can talk about weaning this fall at [facility].  - escitalopram (LEXAPRO) 20 MG tablet; Take 1 tablet (20 mg) by mouth daily  Dispense: 90 tablet; Refill: 1    3. Weight gain  4. BMI > 40.0 (H)  Likely related to diet, decreased activity. Is having some symptoms of thyroid like irregular periods so will recheck today.   - TSH with free T4 reflex    5. Hypothyroidism, postsurgical  - TSH with free T4 reflex    6. Dermatitis  Likely related to foam at work. Talked about stepping up hydration (vaseline + gloves at night) and using new  at work.     Patient Instructions   It was nice to see you in clinic.    I'm glad that things are better- you did good hard work for yourself and for your son.     Keep the lexapro the same for now - let's give you 6 months of really good. Can talk to Dr. Dorantes this " fall about weaning if you'd like.     Therapy is always there.     Goal to lose some weight this summer. Walks! As much as possible avoid the NICU snacking.     Checking thyroid today. I'll be in touch via MyChart.    Dusty Bang MD  [facility] [place]

## 2019-04-26 ENCOUNTER — OFFICE VISIT (OUTPATIENT)
Dept: PEDIATRICS | Facility: CLINIC | Age: 44
End: 2019-04-26
Payer: COMMERCIAL

## 2019-04-26 VITALS
HEART RATE: 64 BPM | OXYGEN SATURATION: 98 % | HEIGHT: 66 IN | DIASTOLIC BLOOD PRESSURE: 68 MMHG | BODY MASS INDEX: 39.91 KG/M2 | WEIGHT: 248.3 LBS | SYSTOLIC BLOOD PRESSURE: 106 MMHG | TEMPERATURE: 97.7 F

## 2019-04-26 DIAGNOSIS — R63.5 WEIGHT GAIN: ICD-10-CM

## 2019-04-26 DIAGNOSIS — L30.9 DERMATITIS: ICD-10-CM

## 2019-04-26 DIAGNOSIS — F33.42 RECURRENT MAJOR DEPRESSIVE DISORDER, IN FULL REMISSION (H): Primary | ICD-10-CM

## 2019-04-26 DIAGNOSIS — F41.9 ANXIETY: ICD-10-CM

## 2019-04-26 DIAGNOSIS — E89.0 HYPOTHYROIDISM, POSTSURGICAL: ICD-10-CM

## 2019-04-26 DIAGNOSIS — E66.01 MORBID OBESITY (H): ICD-10-CM

## 2019-04-26 PROBLEM — F33.1 MODERATE EPISODE OF RECURRENT MAJOR DEPRESSIVE DISORDER (H): Status: RESOLVED | Noted: 2018-12-06 | Resolved: 2019-04-26

## 2019-04-26 LAB — TSH SERPL DL<=0.005 MIU/L-ACNC: 1.42 MU/L (ref 0.4–4)

## 2019-04-26 PROCEDURE — 84443 ASSAY THYROID STIM HORMONE: CPT | Performed by: INTERNAL MEDICINE

## 2019-04-26 PROCEDURE — 36415 COLL VENOUS BLD VENIPUNCTURE: CPT | Performed by: INTERNAL MEDICINE

## 2019-04-26 PROCEDURE — 99214 OFFICE O/P EST MOD 30 MIN: CPT | Performed by: INTERNAL MEDICINE

## 2019-04-26 RX ORDER — ESCITALOPRAM OXALATE 20 MG/1
20 TABLET ORAL DAILY
Qty: 90 TABLET | Refills: 1 | Status: SHIPPED | OUTPATIENT
Start: 2019-04-26 | End: 2019-11-06

## 2019-04-26 ASSESSMENT — PATIENT HEALTH QUESTIONNAIRE - PHQ9: SUM OF ALL RESPONSES TO PHQ QUESTIONS 1-9: 1

## 2019-04-26 ASSESSMENT — MIFFLIN-ST. JEOR: SCORE: 1779.03

## 2019-04-26 NOTE — PATIENT INSTRUCTIONS
It was nice to see you in clinic.    I'm glad that things are better- you did good hard work for yourself and for your son.     Keep the lexapro the same for now - let's give you 6 months of really good. Can talk to Dr. Dorantes this fall about weaning if you'd like.     Therapy is always there.     Goal to lose some weight this summer. Walks! As much as possible avoid the NICU snacking.     Checking thyroid today. I'll be in touch via vidIQ.

## 2019-09-03 DIAGNOSIS — E89.0 HYPOTHYROIDISM, POSTSURGICAL: ICD-10-CM

## 2019-09-04 RX ORDER — LEVOTHYROXINE SODIUM 137 UG/1
TABLET ORAL
Qty: 90 TABLET | Refills: 1 | Status: SHIPPED | OUTPATIENT
Start: 2019-09-04 | End: 2020-03-19

## 2019-09-27 ENCOUNTER — HEALTH MAINTENANCE LETTER (OUTPATIENT)
Age: 44
End: 2019-09-27

## 2019-10-14 ENCOUNTER — ANCILLARY PROCEDURE (OUTPATIENT)
Dept: GENERAL RADIOLOGY | Facility: CLINIC | Age: 44
End: 2019-10-14
Attending: FAMILY MEDICINE
Payer: COMMERCIAL

## 2019-10-14 ENCOUNTER — OFFICE VISIT (OUTPATIENT)
Dept: ORTHOPEDICS | Facility: CLINIC | Age: 44
End: 2019-10-14
Payer: COMMERCIAL

## 2019-10-14 VITALS
HEIGHT: 66 IN | BODY MASS INDEX: 39.86 KG/M2 | WEIGHT: 248 LBS | DIASTOLIC BLOOD PRESSURE: 78 MMHG | SYSTOLIC BLOOD PRESSURE: 118 MMHG

## 2019-10-14 DIAGNOSIS — M25.561 ARTHRALGIA OF RIGHT LOWER LEG: ICD-10-CM

## 2019-10-14 DIAGNOSIS — M25.561 ARTHRALGIA OF RIGHT LOWER LEG: Primary | ICD-10-CM

## 2019-10-14 PROCEDURE — 99204 OFFICE O/P NEW MOD 45 MIN: CPT | Performed by: FAMILY MEDICINE

## 2019-10-14 PROCEDURE — 73590 X-RAY EXAM OF LOWER LEG: CPT | Mod: RT | Performed by: FAMILY MEDICINE

## 2019-10-14 ASSESSMENT — MIFFLIN-ST. JEOR: SCORE: 1777.7

## 2019-10-14 ASSESSMENT — ENCOUNTER SYMPTOMS: MYALGIAS: 1

## 2019-10-14 NOTE — PROGRESS NOTES
[facility]   Clinic Visit s Oct 14, 2019    PCP: Anai Dorantes Heide      Kaleigh is a 44 year old female who is seen as self referral for   Chief Complaint   Patient presents with     Right Leg - Pain       Injury: Reports insidious onset without acute precipitating event.    Location of Pain: right lower leg posterior, nonradiating   Duration of Pain: acute, 3 week(s), worse in the last week  Rating of Pain at worst: 8/10  Rating of Pain Currently: 4/10  Pain is better with: activity avoidance   Pain is worse with: stair climbing and walking   Treatment so far consists of:  ice, rest, foam rolling  Associated symptoms: no distal numbness or tingling; denies swelling or warmth  Recent imaging completed: No recent imaging completed.  Prior History of related problems: ongoing knee and feet issues    No family history of DVT, on no hormonal supplementation, positive immobilization for long car ride to [place] prior to onset.    Social History: is employed as a/an nurse      Past Medical History:   Diagnosis Date     Allergic rhinitis, cause unspecified      Anxiety state, unspecified      Graves's Disease      Moderate episode of recurrent major depressive disorder (H) 12/6/2018     Multinodular Goiter      Severe pre-eclampsia, unspecified as to episode of care        Patient Active Problem List    Diagnosis Date Noted     Recurrent major depressive disorder, in full remission (H) 04/26/2019     Priority: Medium     Vitamin D deficiency 06/28/2013     Priority: Medium     Problem list name updated by automated process. Provider to review and confirm  Imo Update utility       Hypothyroidism, postsurgical 06/26/2013     Priority: Low     Multinodular goiter removed, hyperthyroid previous- some low parathyroid function after surgery.        Family history of diabetes mellitus 06/26/2013     Priority: Low     BMI > 40.0 (H) 06/26/2013     Priority: Low     CARDIOVASCULAR SCREENING;  LDL GOAL LESS THAN 160 02/10/2010     Priority: Low       Family History   Problem Relation Age of Onset     Thyroid Disease Mother      Other - See Comments Mother         prediabetes     Osteoporosis Mother      Hypertension Sister      Diabetes Sister      Hypertension Sister      Congenital Anomalies Son         autism, cognitive delay       Social History     Socioeconomic History     Marital status:      Spouse name: Not on file     Number of children: Not on file     Years of education: Not on file     Highest education level: Not on file   Occupational History     Not on file   Social Needs     Financial resource strain: Not on file     Food insecurity:     Worry: Not on file     Inability: Not on file     Transportation needs:     Medical: Not on file     Non-medical: Not on file   Tobacco Use     Smoking status: Never Smoker     Smokeless tobacco: Never Used   Substance and Sexual Activity     Alcohol use: Yes     Alcohol/week: 3.0 standard drinks     Types: 3 Standard drinks or equivalent per week     Comment: 1 month     Drug use: No     Sexual activity: Yes     Partners: Male     Birth control/protection: Surgical   Lifestyle     Physical activity:     Days per week: Not on file     Minutes per session: Not on file     Stress: Not on file   Relationships     Social connections:     Talks on phone: Not on file     Gets together: Not on file     Attends Zoroastrian service: Not on file     Active member of club or organization: Not on file     Attends meetings of clubs or organizations: Not on file     Relationship status: Not on file     Intimate partner violence:     Fear of current or ex partner: Not on file     Emotionally abused: Not on file     Physically abused: Not on file     Forced sexual activity: Not on file   Other Topics Concern     Parent/sibling w/ CABG, MI or angioplasty before 65F 55M? No   Social History Narrative    , .  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    "      Past Surgical History:   Procedure Laterality Date     C  DELIVERY ONLY  2005    , Low Cervical     C  DELIVERY ONLY      , Low Cervical     C  DELIVERY ONLY      , Low Cervical     HC THYROIDECTOMY             Review of Systems   Musculoskeletal: Positive for myalgias.   All other systems reviewed and are negative.        Physical Exam   /78   Ht 1.67 m (5' 5.75\")   Wt 112.5 kg (248 lb)   BMI 40.33 kg/m    Constitutional:well-developed, well-nourished, and in no distress.   Cardiovascular: Intact distal pulses.    Neurological: alert. Gait Abnormal:   Antalgic gait  Skin: Skin is warm and dry.   Psychiatric: Mood and affect normal.   Respiratory: unlabored, speaks in full sentences  Lymph: no LAD, no lymphangitis            Right Ankle Exam     Tenderness   Right ankle tenderness location: Posterior medial mid calf.    Range of Motion   Dorsiflexion: normal   Plantar flexion: normal   Eversion: normal   Inversion: normal     Muscle Strength   Dorsiflexion:  5/5  Plantar flexion:  5/5  Anterior tibial:  5/5  Posterior tibial:  5/5  Gastrocsoleus:  5/5  Peroneal muscle:  5/5    Tests   Anterior drawer: negative  Varus tilt: negative    Other   Erythema: absent  Scars: absent  Sensation: normal  Pulse: present     Comments:  Painful toe raise on the right.  No swelling.  Circulation intact by palpation.               X-ray images Ordered and independently reviewed by me in the office today with the patient. X-ray shows:     Recent Results (from the past 744 hour(s))   XR Tibia & Fibula Right 2 Views    Narrative    10/14/2019    No fractures or lesion. Normal gross alignment. No soft tissue   abnormalities.       ASSESSMENT/PLAN    ICD-10-CM    1. Arthralgia of right lower leg M25.561 XR Tibia & Fibula Right 2 Views     US Lower Extremity Venous Duplex Right     With history of antecedent relative immobilization in long car drive, DVT is of " consideration.  Venous ultrasound ordered.  If positive, will send patient to ER, if negative, will return for further evaluation.

## 2019-10-14 NOTE — LETTER
10/14/2019         RE: Kaleigh Nam  [address]  [place] [address]        Dear Colleague,    Thank you for referring your patient, Kaleigh Nam, to the  SP[facility]TS MEDICINE. Please see a copy of my visit note below.    [facility]   Clinic Visit s Oct 14, 2019    PCP: Anai Dorantes Heide      Kaleigh is a 44 year old female who is seen as self referral for   Chief Complaint   Patient presents with     Right Leg - Pain       Injury: Reports insidious onset without acute precipitating event.    Location of Pain: right lower leg posterior, nonradiating   Duration of Pain: acute, 3 week(s), worse in the last week  Rating of Pain at worst: 8/10  Rating of Pain Currently: 4/10  Pain is better with: activity avoidance   Pain is worse with: stair climbing and walking   Treatment so far consists of:  ice, rest, foam rolling  Associated symptoms: no distal numbness or tingling; denies swelling or warmth  Recent imaging completed: No recent imaging completed.  Prior History of related problems: ongoing knee and feet issues    No family history of DVT, on no hormonal supplementation, positive immobilization for long car ride to [place] prior to onset.    Social History: is employed as a/an nurse      Past Medical History:   Diagnosis Date     Allergic rhinitis, cause unspecified      Anxiety state, unspecified      Graves's Disease      Moderate episode of recurrent major depressive disorder (H) 12/6/2018     Multinodular Goiter      Severe pre-eclampsia, unspecified as to episode of care        Patient Active Problem List    Diagnosis Date Noted     Recurrent major depressive disorder, in full remission (H) 04/26/2019     Priority: Medium     Vitamin D deficiency 06/28/2013     Priority: Medium     Problem list name updated by automated process. Provider to review and confirm  Imo Update utility       Hypothyroidism, postsurgical 06/26/2013     Priority: Low      Multinodular goiter removed, hyperthyroid previous- some low parathyroid function after surgery.        Family history of diabetes mellitus 06/26/2013     Priority: Low     BMI > 40.0 (H) 06/26/2013     Priority: Low     CARDIOVASCULAR SCREENING; LDL GOAL LESS THAN 160 02/10/2010     Priority: Low       Family History   Problem Relation Age of Onset     Thyroid Disease Mother      Other - See Comments Mother         prediabetes     Osteoporosis Mother      Hypertension Sister      Diabetes Sister      Hypertension Sister      Congenital Anomalies Son         autism, cognitive delay       Social History     Socioeconomic History     Marital status:      Spouse name: Not on file     Number of children: Not on file     Years of education: Not on file     Highest education level: Not on file   Occupational History     Not on file   Social Needs     Financial resource strain: Not on file     Food insecurity:     Worry: Not on file     Inability: Not on file     Transportation needs:     Medical: Not on file     Non-medical: Not on file   Tobacco Use     Smoking status: Never Smoker     Smokeless tobacco: Never Used   Substance and Sexual Activity     Alcohol use: Yes     Alcohol/week: 3.0 standard drinks     Types: 3 Standard drinks or equivalent per week     Comment: 1 month     Drug use: No     Sexual activity: Yes     Partners: Male     Birth control/protection: Surgical   Lifestyle     Physical activity:     Days per week: Not on file     Minutes per session: Not on file     Stress: Not on file   Relationships     Social connections:     Talks on phone: Not on file     Gets together: Not on file     Attends Tenriism service: Not on file     Active member of club or organization: Not on file     Attends meetings of clubs or organizations: Not on file     Relationship status: Not on file     Intimate partner violence:     Fear of current or ex partner: Not on file     Emotionally abused: Not on file      "Physically abused: Not on file     Forced sexual activity: Not on file   Other Topics Concern     Parent/sibling w/ CABG, MI or angioplasty before 65F 55M? No   Social History Narrative    , .  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         Past Surgical History:   Procedure Laterality Date     C  DELIVERY ONLY      , Low Cervical     C  DELIVERY ONLY      , Low Cervical     C  DELIVERY ONLY      , Low Cervical     HC THYROIDECTOMY             Review of Systems   Musculoskeletal: Positive for myalgias.   All other systems reviewed and are negative.        Physical Exam   /78   Ht 1.67 m (5' 5.75\")   Wt 112.5 kg (248 lb)   BMI 40.33 kg/m     Constitutional:well-developed, well-nourished, and in no distress.   Cardiovascular: Intact distal pulses.    Neurological: alert. Gait Abnormal:   Antalgic gait  Skin: Skin is warm and dry.   Psychiatric: Mood and affect normal.   Respiratory: unlabored, speaks in full sentences  Lymph: no LAD, no lymphangitis            Right Ankle Exam     Tenderness   Right ankle tenderness location: Posterior medial mid calf.    Range of Motion   Dorsiflexion: normal   Plantar flexion: normal   Eversion: normal   Inversion: normal     Muscle Strength   Dorsiflexion:  5/5  Plantar flexion:  5/5  Anterior tibial:  5/5  Posterior tibial:  5/5  Gastrocsoleus:  5/5  Peroneal muscle:  5/5    Tests   Anterior drawer: negative  Varus tilt: negative    Other   Erythema: absent  Scars: absent  Sensation: normal  Pulse: present     Comments:  Painful toe raise on the right.  No swelling.  Circulation intact by palpation.               X-ray images Ordered and independently reviewed by me in the office today with the patient. X-ray shows:     Recent Results (from the past 744 hour(s))   XR Tibia & Fibula Right 2 Views    Narrative    10/14/2019    No fractures or lesion. Normal gross alignment. No soft tissue "   abnormalities.       ASSESSMENT/PLAN    ICD-10-CM    1. Arthralgia of right lower leg M25.561 XR Tibia & Fibula Right 2 Views     US Lower Extremity Venous Duplex Right     With history of antecedent relative immobilization in long car drive, DVT is of consideration.  Venous ultrasound ordered.  If positive, will send patient to ER, if negative, will return for further evaluation.      Again, thank you for allowing me to participate in the care of your patient.        Sincerely,        Sandro Eller MD

## 2019-10-15 ENCOUNTER — TELEPHONE (OUTPATIENT)
Dept: ORTHOPEDICS | Facility: CLINIC | Age: 44
End: 2019-10-15

## 2019-10-15 ENCOUNTER — HOSPITAL ENCOUNTER (OUTPATIENT)
Dept: ULTRASOUND IMAGING | Facility: CLINIC | Age: 44
Discharge: HOME | End: 2019-10-15
Attending: FAMILY MEDICINE | Admitting: FAMILY MEDICINE
Payer: COMMERCIAL

## 2019-10-15 DIAGNOSIS — M25.561 ARTHRALGIA OF RIGHT LOWER LEG: ICD-10-CM

## 2019-10-15 PROCEDURE — 93971 EXTREMITY STUDY: CPT | Mod: RT

## 2019-10-15 NOTE — TELEPHONE ENCOUNTER
Notified of normal venous ultrasound indicating no DVT.  Advised cold compresses up to 50 minutes/h.  Recheck appointment requested.

## 2019-10-21 ENCOUNTER — OFFICE VISIT (OUTPATIENT)
Dept: ORTHOPEDICS | Facility: CLINIC | Age: 44
End: 2019-10-21
Payer: COMMERCIAL

## 2019-10-21 VITALS
DIASTOLIC BLOOD PRESSURE: 73 MMHG | SYSTOLIC BLOOD PRESSURE: 110 MMHG | HEIGHT: 66 IN | BODY MASS INDEX: 39.86 KG/M2 | WEIGHT: 248 LBS

## 2019-10-21 DIAGNOSIS — S86.811A STRAIN OF CALF MUSCLE, RIGHT, INITIAL ENCOUNTER: ICD-10-CM

## 2019-10-21 DIAGNOSIS — E66.01 MORBID OBESITY (H): Primary | ICD-10-CM

## 2019-10-21 PROCEDURE — 99213 OFFICE O/P EST LOW 20 MIN: CPT | Performed by: FAMILY MEDICINE

## 2019-10-21 ASSESSMENT — MIFFLIN-ST. JEOR: SCORE: 1777.7

## 2019-10-21 ASSESSMENT — ENCOUNTER SYMPTOMS: MYALGIAS: 1

## 2019-10-21 NOTE — LETTER
10/21/2019         RE: Kaleigh Nam  [address]  [place] [address]        Dear Colleague,    Thank you for referring your patient, Kaleigh Nam, to the  [facility]TS [facility]. Please see a copy of my visit note below.    Musculoskeletal Problem   Associated symptoms include myalgias.        [facility]   Follow-up Visit s Oct 21, 2019    PCP: Anai Dorantes      Subjective:  Kaleigh is a 44 year old female who is seen in follow up for evaluation of   Chief Complaint   Patient presents with     Right Leg - Pain     Her last visit was on 10/14/2019.  Since that time, symptoms have been better than before. Kaleigh Nam is accompanied today by self.       Patient had a US since last visit., neg for DVT.   Patient reports 50% improvement since last visit.  Patient has noticed improved symptoms with rest treatment.    Pain is located right posterior lower leg, medial, getting progressively better.   Still no recall of any type of injury despite symptoms of calf strain.  She talks about difficulties with exercising, and weight gain, and would like to find a way to exercise better.  Patient is using no aids.    Patient denies any new injuries.    Patient's past medical, surgical, social and family histories are reviewed today.    History from previous visit on 10/14/2019  Injury: Reports insidious onset without acute precipitating event.    Location of Pain: right lower leg posterior, nonradiating   Duration of Pain: acute, 3 week(s), worse in the last week  Rating of Pain at worst: 8/10  Rating of Pain Currently: 4/10  Pain is better with: activity avoidance   Pain is worse with: stair climbing and walking   Treatment so far consists of:  ice, rest, foam rolling  Associated symptoms: no distal numbness or tingling; denies swelling or warmth  Recent imaging completed: No recent imaging completed.  Prior History of related problems: ongoing knee and feet issues    No  family history of DVT, on no hormonal supplementation, positive immobilization for long car ride to [place] prior to onset.    Social History: is employed as a/an nurse      Past Medical History:   Diagnosis Date     Allergic rhinitis, cause unspecified      Anxiety state, unspecified      Graves's Disease      Moderate episode of recurrent major depressive disorder (H) 12/6/2018     Multinodular Goiter      Severe pre-eclampsia, unspecified as to episode of care        Patient Active Problem List    Diagnosis Date Noted     Recurrent major depressive disorder, in full remission (H) 04/26/2019     Priority: Medium     Vitamin D deficiency 06/28/2013     Priority: Medium     Problem list name updated by automated process. Provider to review and confirm  Imo Update utility       Hypothyroidism, postsurgical 06/26/2013     Priority: Low     Multinodular goiter removed, hyperthyroid previous- some low parathyroid function after surgery.        Family history of diabetes mellitus 06/26/2013     Priority: Low     BMI > 40.0 (H) 06/26/2013     Priority: Low     CARDIOVASCULAR SCREENING; LDL GOAL LESS THAN 160 02/10/2010     Priority: Low       Family History   Problem Relation Age of Onset     Thyroid Disease Mother      Other - See Comments Mother         prediabetes     Osteoporosis Mother      Hypertension Sister      Diabetes Sister      Hypertension Sister      Congenital Anomalies Son         autism, cognitive delay       Social History     Socioeconomic History     Marital status:      Spouse name: Not on file     Number of children: Not on file     Years of education: Not on file     Highest education level: Not on file   Occupational History     Not on file   Social Needs     Financial resource strain: Not on file     Food insecurity:     Worry: Not on file     Inability: Not on file     Transportation needs:     Medical: Not on file     Non-medical: Not on file   Tobacco Use     Smoking status: Never Smoker  "    Smokeless tobacco: Never Used   Substance and Sexual Activity     Alcohol use: Yes     Alcohol/week: 3.0 standard drinks     Types: 3 Standard drinks or equivalent per week     Comment: 1 month     Drug use: No     Sexual activity: Yes     Partners: Male     Birth control/protection: Surgical   Lifestyle     Physical activity:     Days per week: Not on file     Minutes per session: Not on file     Stress: Not on file   Relationships     Social connections:     Talks on phone: Not on file     Gets together: Not on file     Attends Druze service: Not on file     Active member of club or organization: Not on file     Attends meetings of clubs or organizations: Not on file     Relationship status: Not on file     Intimate partner violence:     Fear of current or ex partner: Not on file     Emotionally abused: Not on file     Physically abused: Not on file     Forced sexual activity: Not on file   Other Topics Concern     Parent/sibling w/ CABG, MI or angioplasty before 65F 55M? No   Social History Narrative    , .  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         Past Surgical History:   Procedure Laterality Date     C  DELIVERY ONLY      , Low Cervical     C  DELIVERY ONLY      , Low Cervical     C  DELIVERY ONLY      , Low Cervical     HC THYROIDECTOMY             Review of Systems   Musculoskeletal: Positive for myalgias.   All other systems reviewed and are negative.        Physical Exam   /73   Ht 1.67 m (5' 5.75\")   Wt 112.5 kg (248 lb)   BMI 40.33 kg/m     Constitutional:well-developed, well-nourished, and in no distress.   Cardiovascular: Intact distal pulses.    Neurological: alert. Gait Normal:   Gait, station, stance, and balance appear normal for age  Skin: Skin is warm and dry.   Psychiatric: Mood and affect normal.   Respiratory: unlabored, speaks in full sentences  Lymph: no LAD, no lymphangitis            Right Ankle " Exam     Tenderness   Right ankle tenderness location: Posterior medial mid calf.  Swelling: none    Range of Motion   Dorsiflexion: normal   Plantar flexion: normal   Eversion: normal   Inversion: normal     Muscle Strength   Dorsiflexion:  5/5  Plantar flexion:  5/5  Anterior tibial:  5/5  Posterior tibial:  4/5  Gastrocsoleus:  4/5  Peroneal muscle:  5/5    Tests   Anterior drawer: negative  Varus tilt: negative    Other   Erythema: absent  Scars: absent  Sensation: normal  Pulse: present     Comments:  Mild medial gastroc atrophy noted today.  Pain and weakness with toe walking and toe raise.               X-ray images Ordered and independently reviewed by me in the office today with the patient. X-ray shows:     Recent Results (from the past 744 hour(s))   XR Tibia & Fibula Right 2 Views    Narrative    10/14/2019    No fractures or lesion. Normal gross alignment. No soft tissue   abnormalities.     US Lower Extremity Venous Duplex Right    Narrative    ULTRASOUND LOWER EXTREMITY VENOUS DUPLEX RIGHT 10/15/2019 2:20 PM     HISTORY: Acute calf pain for 3 weeks, following long car ride,  negative x-rays, no trauma. Arthralgia of right lower leg.     FINDINGS: The deep veins in the right lower extremity are compressible  throughout. The deep veins demonstrate normal venous augmentation,  waveforms and color Doppler flow. No evidence of superficial  thrombophlebitis.      Impression    IMPRESSION: No evidence of DVT.    SANDY NGUYEN MD     ASSESSMENT/PLAN    ICD-10-CM    1. BMI > 40.0 (H) E66.01 [facility]   2. Strain of calf muscle, right, initial encounter S86.811A [facility]     Reassurance, no sign of DVT.  Symptomatology still consistent with medial gastroc strain, etiology uncertain.  Will proceed with physical therapy for strengthening of the calf, and she is very interested in alter G treadmill use to assist with conditioning to promote activity and  weight loss.  Referral placed.    Again, thank you for allowing me to participate in the care of your patient.        Sincerely,        Sandro Eller MD

## 2019-10-21 NOTE — PROGRESS NOTES
Musculoskeletal Problem   Associated symptoms include myalgias.        [facility]   Follow-up Visit s Oct 21, 2019    PCP: Anai Dorantes      Subjective:  Kaleigh is a 44 year old female who is seen in follow up for evaluation of   Chief Complaint   Patient presents with     Right Leg - Pain     Her last visit was on 10/14/2019.  Since that time, symptoms have been better than before. Kaleigh Nam is accompanied today by self.       Patient had a US since last visit., neg for DVT.   Patient reports 50% improvement since last visit.  Patient has noticed improved symptoms with rest treatment.    Pain is located right posterior lower leg, medial, getting progressively better.   Still no recall of any type of injury despite symptoms of calf strain.  She talks about difficulties with exercising, and weight gain, and would like to find a way to exercise better.  Patient is using no aids.    Patient denies any new injuries.    Patient's past medical, surgical, social and family histories are reviewed today.    History from previous visit on 10/14/2019  Injury: Reports insidious onset without acute precipitating event.    Location of Pain: right lower leg posterior, nonradiating   Duration of Pain: acute, 3 week(s), worse in the last week  Rating of Pain at worst: 8/10  Rating of Pain Currently: 4/10  Pain is better with: activity avoidance   Pain is worse with: stair climbing and walking   Treatment so far consists of:  ice, rest, foam rolling  Associated symptoms: no distal numbness or tingling; denies swelling or warmth  Recent imaging completed: No recent imaging completed.  Prior History of related problems: ongoing knee and feet issues    No family history of DVT, on no hormonal supplementation, positive immobilization for long car ride to [place] prior to onset.    Social History: is employed as a/an nurse      Past Medical History:   Diagnosis Date     Allergic rhinitis, cause  unspecified      Anxiety state, unspecified      Graves's Disease      Moderate episode of recurrent major depressive disorder (H) 12/6/2018     Multinodular Goiter      Severe pre-eclampsia, unspecified as to episode of care        Patient Active Problem List    Diagnosis Date Noted     Recurrent major depressive disorder, in full remission (H) 04/26/2019     Priority: Medium     Vitamin D deficiency 06/28/2013     Priority: Medium     Problem list name updated by automated process. Provider to review and confirm  [employer] Update utility       Hypothyroidism, postsurgical 06/26/2013     Priority: Low     Multinodular goiter removed, hyperthyroid previous- some low parathyroid function after surgery.        Family history of diabetes mellitus 06/26/2013     Priority: Low     BMI > 40.0 (H) 06/26/2013     Priority: Low     CARDIOVASCULAR SCREENING; LDL GOAL LESS THAN 160 02/10/2010     Priority: Low       Family History   Problem Relation Age of Onset     Thyroid Disease Mother      Other - See Comments Mother         prediabetes     Osteoporosis Mother      Hypertension Sister      Diabetes Sister      Hypertension Sister      Congenital Anomalies Son         autism, cognitive delay       Social History     Socioeconomic History     Marital status:      Spouse name: Not on file     Number of children: Not on file     Years of education: Not on file     Highest education level: Not on file   Occupational History     Not on file   Social Needs     Financial resource strain: Not on file     Food insecurity:     Worry: Not on file     Inability: Not on file     Transportation needs:     Medical: Not on file     Non-medical: Not on file   Tobacco Use     Smoking status: Never Smoker     Smokeless tobacco: Never Used   Substance and Sexual Activity     Alcohol use: Yes     Alcohol/week: 3.0 standard drinks     Types: 3 Standard drinks or equivalent per week     Comment: 1 month     Drug use: No     Sexual activity: Yes  "    Partners: Male     Birth control/protection: Surgical   Lifestyle     Physical activity:     Days per week: Not on file     Minutes per session: Not on file     Stress: Not on file   Relationships     Social connections:     Talks on phone: Not on file     Gets together: Not on file     Attends Mu-ism service: Not on file     Active member of club or organization: Not on file     Attends meetings of clubs or organizations: Not on file     Relationship status: Not on file     Intimate partner violence:     Fear of current or ex partner: Not on file     Emotionally abused: Not on file     Physically abused: Not on file     Forced sexual activity: Not on file   Other Topics Concern     Parent/sibling w/ CABG, MI or angioplasty before 65F 55M? No   Social History Narrative    , .  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         Past Surgical History:   Procedure Laterality Date     C  DELIVERY ONLY      , Low Cervical     C  DELIVERY ONLY      , Low Cervical     C  DELIVERY ONLY      , Low Cervical     HC THYROIDECTOMY             Review of Systems   Musculoskeletal: Positive for myalgias.   All other systems reviewed and are negative.        Physical Exam   /73   Ht 1.67 m (5' 5.75\")   Wt 112.5 kg (248 lb)   BMI 40.33 kg/m    Constitutional:well-developed, well-nourished, and in no distress.   Cardiovascular: Intact distal pulses.    Neurological: alert. Gait Normal:   Gait, station, stance, and balance appear normal for age  Skin: Skin is warm and dry.   Psychiatric: Mood and affect normal.   Respiratory: unlabored, speaks in full sentences  Lymph: no LAD, no lymphangitis            Right Ankle Exam     Tenderness   Right ankle tenderness location: Posterior medial mid calf.  Swelling: none    Range of Motion   Dorsiflexion: normal   Plantar flexion: normal   Eversion: normal   Inversion: normal     Muscle Strength   Dorsiflexion:  " 5/5  Plantar flexion:  5/5  Anterior tibial:  5/5  Posterior tibial:  4/5  Gastrocsoleus:  4/5  Peroneal muscle:  5/5    Tests   Anterior drawer: negative  Varus tilt: negative    Other   Erythema: absent  Scars: absent  Sensation: normal  Pulse: present     Comments:  Mild medial gastroc atrophy noted today.  Pain and weakness with toe walking and toe raise.               X-ray images Ordered and independently reviewed by me in the office today with the patient. X-ray shows:     Recent Results (from the past 744 hour(s))   XR Tibia & Fibula Right 2 Views    Narrative    10/14/2019    No fractures or lesion. Normal gross alignment. No soft tissue   abnormalities.     US Lower Extremity Venous Duplex Right    Narrative    ULTRASOUND LOWER EXTREMITY VENOUS DUPLEX RIGHT 10/15/2019 2:20 PM     HISTORY: Acute calf pain for 3 weeks, following long car ride,  negative x-rays, no trauma. Arthralgia of right lower leg.     FINDINGS: The deep veins in the right lower extremity are compressible  throughout. The deep veins demonstrate normal venous augmentation,  waveforms and color Doppler flow. No evidence of superficial  thrombophlebitis.      Impression    IMPRESSION: No evidence of DVT.    SANDY NGUYEN MD     ASSESSMENT/PLAN    ICD-10-CM    1. BMI > 40.0 (H) E66.01 [facility]   2. Strain of calf muscle, right, initial encounter S86.811A [facility]     Reassurance, no sign of DVT.  Symptomatology still consistent with medial gastroc strain, etiology uncertain.  Will proceed with physical therapy for strengthening of the calf, and she is very interested in alter G treadmill use to assist with conditioning to promote activity and weight loss.  Referral placed.

## 2019-10-24 ENCOUNTER — THERAPY VISIT (OUTPATIENT)
Dept: PHYSICAL THERAPY | Facility: CLINIC | Age: 44
End: 2019-10-24
Attending: FAMILY MEDICINE
Payer: COMMERCIAL

## 2019-10-24 DIAGNOSIS — M25.562 CHRONIC PAIN OF BOTH KNEES: ICD-10-CM

## 2019-10-24 DIAGNOSIS — S86.811D STRAIN OF CALF MUSCLE, RIGHT, SUBSEQUENT ENCOUNTER: ICD-10-CM

## 2019-10-24 DIAGNOSIS — M25.561 CHRONIC PAIN OF BOTH KNEES: ICD-10-CM

## 2019-10-24 DIAGNOSIS — G89.29 CHRONIC PAIN OF BOTH KNEES: ICD-10-CM

## 2019-10-24 PROBLEM — S86.811A STRAIN OF CALF MUSCLE, RIGHT, INITIAL ENCOUNTER: Status: ACTIVE | Noted: 2019-10-24

## 2019-10-24 PROCEDURE — 97140 MANUAL THERAPY 1/> REGIONS: CPT | Mod: GP | Performed by: PHYSICAL THERAPIST

## 2019-10-24 PROCEDURE — 97110 THERAPEUTIC EXERCISES: CPT | Mod: GP | Performed by: PHYSICAL THERAPIST

## 2019-10-24 PROCEDURE — 97161 PT EVAL LOW COMPLEX 20 MIN: CPT | Mod: GP | Performed by: PHYSICAL THERAPIST

## 2019-10-24 NOTE — PROGRESS NOTES
Physical Therapy Initial Evaluation    Precautions/Restrictions/MD instructions: PT eval and treat.       Subjective History:    Injury/Condition Details:  Presenting Complaint Carin presents with R) calf pain, ongoing for ~1 month.Notices Achilles pain, feels like she can't walk down stairs normally. Tried to go down reciprocally, felt a pop, couldn't walk without a limp after that. Cleared for blood clot, fracture - told to rest. 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 Still noticing some off and on pain with walking. Notes a history of B) patellofemoral syndrome, started having some foot pain also last summer. Continues to have a variety of aching sensations throughout the body - hips, knees, ankles, back. Previously ran half marathons, active with hiking - but has not been active recently due to pain.   Onset Timing/Date ~1 month ago   Mechanism Going down stairs      Symptom Behavior Details   Primary Pain Symptoms Location: R) calf pain; also B) knees, feet  Quality: aching   Frequency: intermittent   Worst Pain 4/10   Best Pain 0/10   Symptom Provocators Going down stairs, walking long distancse   Symptom Relievers rest   Time of day dependent? Can get worse as day goes on   Symptom change since onset? improved      Prior Testing/Intervention for current condition:  Prior Tests ultrasound   Prior Treatment none      Lifestyle & General Medical History  General Health Reported by Patient good   Employment/Activities Works as RN in NICU   Pertinent medical/surgical history Depression, overweight, c-sections   Patient Goals Be able to walk, return to exercise     ANKLE:     AROM L AROM R MMT L MMT R   Dorsiflexion 8 deg 5 deg 5/5 5/5   Plantarflexion 50 deg 50 deg 5/5 4/5   Inversion WNL WNL  5/5   Eversion WNL WNL  5/5     Edema:    General: no edema noted    Palpation: mild tenderness L) posterior tib tendon, medial gastroc    Gait: normal    Other: hip abduction strength 4/5 on  L, 4-/5 on R; difficulty engaging glutes with bridging and prone activities    Patient is a 44 year old female with both sides knee and right side ankle complaints.    Patient has the following significant findings with corresponding treatment plan.                Diagnosis 1:  R calf strain  Pain -  manual therapy, self management, education and home program  Decreased ROM/flexibility - manual therapy and therapeutic exercise  Decreased strength - therapeutic exercise and therapeutic activities  Decreased proprioception - neuro re-education and therapeutic activities  Impaired muscle performance - neuro re-education  Decreased function - therapeutic activities  Diagnosis 2:  B) knee pain   Pain -  manual therapy, self management, education and home program  Decreased strength - therapeutic exercise and therapeutic activities  Decreased function - therapeutic activities    Therapy Evaluation Codes:   1) History comprised of:   Personal factors that impact the plan of care:      None.    Comorbidity factors that impact the plan of care are:      Depression.     Medications impacting care: Anti-depressant.  2) Examination of Body Systems comprised of:   Body structures and functions that impact the plan of care:      Ankle and Knee.   Activity limitations that impact the plan of care are:      Squatting/kneeling, Stairs, Standing and Walking.  3) Clinical presentation characteristics are:   Stable/Uncomplicated.  4) Decision-Making    Low complexity using standardized patient assessment instrument and/or measureable assessment of functional outcome.  Cumulative Therapy Evaluation is: Low complexity.    Previous and current functional limitations:  (See Goal Flow Sheet for this information)    Short term and Long term goals: (See Goal Flow Sheet for this information)     Communication ability:  Patient appears to be able to clearly communicate and understand verbal and written communication and follow directions  correctly.  Treatment Explanation - The following has been discussed with the patient:   RX ordered/plan of care  Anticipated outcomes  Possible risks and side effects  This patient would benefit from PT intervention to resume normal activities.   Rehab potential is good.    Frequency:  2 X week, once daily x 2 weeks then tapering to 1x week x 6 weeks  Duration:  For 8 weeks  Discharge Plan:  Achieve all LTG.  Independent in home treatment program.  Reach maximal therapeutic benefit.    Please refer to the daily flowsheet for treatment today, total treatment time and time spent performing 1:1 timed codes.

## 2019-10-26 ENCOUNTER — HEALTH MAINTENANCE LETTER (OUTPATIENT)
Age: 44
End: 2019-10-26

## 2019-10-29 ENCOUNTER — THERAPY VISIT (OUTPATIENT)
Dept: PHYSICAL THERAPY | Facility: CLINIC | Age: 44
End: 2019-10-29
Payer: COMMERCIAL

## 2019-10-29 DIAGNOSIS — G89.29 CHRONIC PAIN OF BOTH KNEES: ICD-10-CM

## 2019-10-29 DIAGNOSIS — S86.811D STRAIN OF CALF MUSCLE, RIGHT, SUBSEQUENT ENCOUNTER: ICD-10-CM

## 2019-10-29 DIAGNOSIS — M25.562 CHRONIC PAIN OF BOTH KNEES: ICD-10-CM

## 2019-10-29 DIAGNOSIS — M25.561 CHRONIC PAIN OF BOTH KNEES: ICD-10-CM

## 2019-10-29 PROCEDURE — 97110 THERAPEUTIC EXERCISES: CPT | Mod: GP | Performed by: PHYSICAL THERAPY ASSISTANT

## 2019-10-29 PROCEDURE — 97140 MANUAL THERAPY 1/> REGIONS: CPT | Mod: GP | Performed by: PHYSICAL THERAPY ASSISTANT

## 2019-11-01 ENCOUNTER — THERAPY VISIT (OUTPATIENT)
Dept: PHYSICAL THERAPY | Facility: CLINIC | Age: 44
End: 2019-11-01
Payer: COMMERCIAL

## 2019-11-01 DIAGNOSIS — S86.811D STRAIN OF CALF MUSCLE, RIGHT, SUBSEQUENT ENCOUNTER: ICD-10-CM

## 2019-11-01 DIAGNOSIS — M25.561 CHRONIC PAIN OF BOTH KNEES: ICD-10-CM

## 2019-11-01 DIAGNOSIS — M25.562 CHRONIC PAIN OF BOTH KNEES: ICD-10-CM

## 2019-11-01 DIAGNOSIS — G89.29 CHRONIC PAIN OF BOTH KNEES: ICD-10-CM

## 2019-11-01 PROCEDURE — 97140 MANUAL THERAPY 1/> REGIONS: CPT | Mod: GP | Performed by: PHYSICAL THERAPIST

## 2019-11-01 PROCEDURE — 97110 THERAPEUTIC EXERCISES: CPT | Mod: GP | Performed by: PHYSICAL THERAPIST

## 2019-11-05 ENCOUNTER — THERAPY VISIT (OUTPATIENT)
Dept: PHYSICAL THERAPY | Facility: CLINIC | Age: 44
End: 2019-11-05
Payer: COMMERCIAL

## 2019-11-05 DIAGNOSIS — S86.811D STRAIN OF CALF MUSCLE, RIGHT, SUBSEQUENT ENCOUNTER: ICD-10-CM

## 2019-11-05 DIAGNOSIS — G89.29 CHRONIC PAIN OF BOTH KNEES: ICD-10-CM

## 2019-11-05 DIAGNOSIS — F33.42 RECURRENT MAJOR DEPRESSIVE DISORDER, IN FULL REMISSION (H): ICD-10-CM

## 2019-11-05 DIAGNOSIS — F41.9 ANXIETY: ICD-10-CM

## 2019-11-05 DIAGNOSIS — M25.562 CHRONIC PAIN OF BOTH KNEES: ICD-10-CM

## 2019-11-05 DIAGNOSIS — M25.561 CHRONIC PAIN OF BOTH KNEES: ICD-10-CM

## 2019-11-05 PROCEDURE — 97110 THERAPEUTIC EXERCISES: CPT | Mod: GP | Performed by: PHYSICAL THERAPIST

## 2019-11-05 PROCEDURE — 97140 MANUAL THERAPY 1/> REGIONS: CPT | Mod: GP | Performed by: PHYSICAL THERAPIST

## 2019-11-06 RX ORDER — ESCITALOPRAM OXALATE 20 MG/1
20 TABLET ORAL DAILY
Qty: 90 TABLET | Refills: 0 | Status: SHIPPED | OUTPATIENT
Start: 2019-11-06 | End: 2020-03-09

## 2019-11-06 NOTE — TELEPHONE ENCOUNTER
Routing refill request to provider for review/approval because:  Patient needs to be seen because:  Over due for 6 month depression screening and PHQ-9.        Valentin Mckeon RN, BSN, PHN

## 2019-11-06 NOTE — TELEPHONE ENCOUNTER
"Requested Prescriptions   Pending Prescriptions Disp Refills     escitalopram (LEXAPRO) 20 MG tablet 90 tablet 1     Sig: Take 1 tablet (20 mg) by mouth daily  Last Written Prescription Date:  04/26/2019+  Last Fill Quantity: 90 tablet,  # refills: 1   Last Office Visit: 4/26/2019 Beti  Future Office Visit:            SSRIs Protocol Failed - 11/5/2019  6:52 PM        Failed - PHQ-9 score less than 5 in past 6 months     PHQ-9 SCORE 12/6/2018 12/13/2018 4/26/2019   PHQ-9 Total Score MyChart - - -   PHQ-9 Total Score 8 2 1     SAMUEL-7 SCORE 12/5/2018 12/6/2018 12/13/2018   Total Score - - -   Total Score 12 (moderate anxiety) - -   Total Score 12 14 3               Failed - Recent (6 mo) or future (30 days) visit within the authorizing provider's specialty     Patient had office visit in the last 6 months or has a visit in the next 30 days with authorizing provider or within the authorizing provider's specialty.  See \"Patient Info\" tab in inbasket, or \"Choose Columns\" in Meds & Orders section of the refill encounter.            Passed - Medication is active on med list        Passed - Patient is age 18 or older        Passed - No active pregnancy on record        Passed - No positive pregnancy test in last 12 months        "

## 2019-11-06 NOTE — TELEPHONE ENCOUNTER
rx sent without refills. Due for annual physical. Please help patient schedule.    Thanks,  Chapis Tran MD  Internal Medicine/Pediatrics

## 2019-11-12 ENCOUNTER — THERAPY VISIT (OUTPATIENT)
Dept: PHYSICAL THERAPY | Facility: CLINIC | Age: 44
End: 2019-11-12
Payer: COMMERCIAL

## 2019-11-12 DIAGNOSIS — M25.562 CHRONIC PAIN OF BOTH KNEES: ICD-10-CM

## 2019-11-12 DIAGNOSIS — S86.811D STRAIN OF CALF MUSCLE, RIGHT, SUBSEQUENT ENCOUNTER: ICD-10-CM

## 2019-11-12 DIAGNOSIS — M25.561 CHRONIC PAIN OF BOTH KNEES: ICD-10-CM

## 2019-11-12 DIAGNOSIS — G89.29 CHRONIC PAIN OF BOTH KNEES: ICD-10-CM

## 2019-11-12 PROCEDURE — 97140 MANUAL THERAPY 1/> REGIONS: CPT | Mod: GP | Performed by: PHYSICAL THERAPIST

## 2019-11-12 PROCEDURE — 97110 THERAPEUTIC EXERCISES: CPT | Mod: GP | Performed by: PHYSICAL THERAPIST

## 2019-11-13 NOTE — TELEPHONE ENCOUNTER
I called and spoke to the pt and she is aware of her need for an appointment and she will call back at a later date to schedule.   Christy Bryant on 11/13/2019 at 2:15 PM

## 2019-11-15 ENCOUNTER — TELEPHONE (OUTPATIENT)
Dept: PEDIATRICS | Facility: CLINIC | Age: 44
End: 2019-11-15

## 2019-11-15 NOTE — TELEPHONE ENCOUNTER
"Patient sent a web request today:    \"Physical.\"    Patient would like to schedule with Dr. Tran at [facility] Monday or Tuesdays.    Please contact patient.    Thank you.    Central Scheduling  Bonita UMANZOR"

## 2019-11-19 NOTE — TELEPHONE ENCOUNTER
Called patient preferred to schedule with Dr. Alvarado first available Monday March 9th.     //Bibi Coyne MA// November 19, 2019 9:53 AM

## 2019-12-03 ENCOUNTER — THERAPY VISIT (OUTPATIENT)
Dept: PHYSICAL THERAPY | Facility: CLINIC | Age: 44
End: 2019-12-03
Payer: COMMERCIAL

## 2019-12-03 DIAGNOSIS — M25.562 CHRONIC PAIN OF BOTH KNEES: ICD-10-CM

## 2019-12-03 DIAGNOSIS — S86.811D STRAIN OF CALF MUSCLE, RIGHT, SUBSEQUENT ENCOUNTER: ICD-10-CM

## 2019-12-03 DIAGNOSIS — M25.561 CHRONIC PAIN OF BOTH KNEES: ICD-10-CM

## 2019-12-03 DIAGNOSIS — G89.29 CHRONIC PAIN OF BOTH KNEES: ICD-10-CM

## 2019-12-03 PROCEDURE — 97110 THERAPEUTIC EXERCISES: CPT | Mod: GP | Performed by: PHYSICAL THERAPIST

## 2019-12-03 PROCEDURE — 97140 MANUAL THERAPY 1/> REGIONS: CPT | Mod: GP | Performed by: PHYSICAL THERAPIST

## 2019-12-03 NOTE — PROGRESS NOTES
PROGRESS  REPORT    Progress reporting period is from 10/24/19 to 12/3/19.       SUBJECTIVE  Subjective changes noted by patient: Knee was pretty sore for a couple of weeks after last visit, but woke up last week and it was completely gone. Has been able to walk up and down the stairs. Calf is still a little tight but isn't painful. Still feels like she lacks strength and has a hard time getting up/down from floor.    Current pain level is 1/10.     Previous pain level was 6/10.   Changes in function:  Yes (See Goal flowsheet attached for changes in current functional level)  Adverse reaction to treatment or activity: None    OBJECTIVE  Changes noted in objective findings:  Yes-  Objective: no tenderness noted R) calf today, tolerated MT well; able to perform more advanced strengthening without knee pain but continues to demonstrate weakness through LE     ASSESSMENT/PLAN  Updated problem list and treatment plan: Diagnosis 1:  R calf strain, LE weakness  Pain -  manual therapy, self management, education and home program  Decreased ROM/flexibility - manual therapy and therapeutic exercise  Decreased strength - therapeutic exercise and therapeutic activities  Impaired muscle performance - neuro re-education  Decreased function - therapeutic activities  STG/LTGs have been met or progress has been made towards goals:  Yes (See Goal flow sheet completed today.)  Assessment of Progress: The patient's condition is improving.  Self Management Plans:  Patient has been instructed in a home treatment program.  I have re-evaluated this patient and find that the nature, scope, duration and intensity of the therapy is appropriate for the medical condition of the patient.  Kaleigh continues to require the following intervention to meet STG and LTG's:  PT    Recommendations:  This patient would benefit from continued therapy.     Frequency:  2 X a month, once daily  Duration:  for 10 weeks    Please refer to the daily flowsheet for  treatment today, total treatment time and time spent performing 1:1 timed codes.

## 2019-12-16 ENCOUNTER — OFFICE VISIT (OUTPATIENT)
Dept: PEDIATRICS | Facility: CLINIC | Age: 44
End: 2019-12-16
Payer: COMMERCIAL

## 2019-12-16 ENCOUNTER — ANCILLARY PROCEDURE (OUTPATIENT)
Dept: MAMMOGRAPHY | Facility: CLINIC | Age: 44
End: 2019-12-16
Payer: COMMERCIAL

## 2019-12-16 VITALS
DIASTOLIC BLOOD PRESSURE: 72 MMHG | OXYGEN SATURATION: 98 % | TEMPERATURE: 98.9 F | BODY MASS INDEX: 42.41 KG/M2 | HEART RATE: 88 BPM | HEIGHT: 66 IN | SYSTOLIC BLOOD PRESSURE: 108 MMHG | WEIGHT: 263.9 LBS

## 2019-12-16 DIAGNOSIS — G25.81 RESTLESS LEG SYNDROME: ICD-10-CM

## 2019-12-16 DIAGNOSIS — M79.605 BILATERAL LEG PAIN: Primary | ICD-10-CM

## 2019-12-16 DIAGNOSIS — E89.0 HYPOTHYROIDISM, POSTSURGICAL: ICD-10-CM

## 2019-12-16 DIAGNOSIS — M25.562 CHRONIC PAIN OF BOTH KNEES: ICD-10-CM

## 2019-12-16 DIAGNOSIS — F33.42 RECURRENT MAJOR DEPRESSIVE DISORDER, IN FULL REMISSION (H): ICD-10-CM

## 2019-12-16 DIAGNOSIS — Z12.31 VISIT FOR SCREENING MAMMOGRAM: ICD-10-CM

## 2019-12-16 DIAGNOSIS — M25.561 CHRONIC PAIN OF BOTH KNEES: ICD-10-CM

## 2019-12-16 DIAGNOSIS — M79.604 BILATERAL LEG PAIN: Primary | ICD-10-CM

## 2019-12-16 DIAGNOSIS — G89.29 CHRONIC PAIN OF BOTH KNEES: ICD-10-CM

## 2019-12-16 DIAGNOSIS — E03.9 HYPOTHYROIDISM, UNSPECIFIED TYPE: ICD-10-CM

## 2019-12-16 DIAGNOSIS — Z12.31 ENCOUNTER FOR SCREENING MAMMOGRAM FOR BREAST CANCER: ICD-10-CM

## 2019-12-16 PROCEDURE — 77067 SCR MAMMO BI INCL CAD: CPT | Mod: TC

## 2019-12-16 PROCEDURE — 99214 OFFICE O/P EST MOD 30 MIN: CPT | Performed by: NURSE PRACTITIONER

## 2019-12-16 PROCEDURE — 77063 BREAST TOMOSYNTHESIS BI: CPT | Mod: TC

## 2019-12-16 PROCEDURE — 96127 BRIEF EMOTIONAL/BEHAV ASSMT: CPT | Mod: 59 | Performed by: NURSE PRACTITIONER

## 2019-12-16 PROCEDURE — 80048 BASIC METABOLIC PNL TOTAL CA: CPT | Performed by: NURSE PRACTITIONER

## 2019-12-16 PROCEDURE — 82728 ASSAY OF FERRITIN: CPT | Performed by: NURSE PRACTITIONER

## 2019-12-16 PROCEDURE — 84443 ASSAY THYROID STIM HORMONE: CPT | Performed by: NURSE PRACTITIONER

## 2019-12-16 PROCEDURE — 36415 COLL VENOUS BLD VENIPUNCTURE: CPT | Performed by: NURSE PRACTITIONER

## 2019-12-16 RX ORDER — DULOXETIN HYDROCHLORIDE 30 MG/1
CAPSULE, DELAYED RELEASE ORAL
Qty: 53 CAPSULE | Refills: 1 | Status: SHIPPED | OUTPATIENT
Start: 2019-12-16 | End: 2020-02-18

## 2019-12-16 ASSESSMENT — PATIENT HEALTH QUESTIONNAIRE - PHQ9
SUM OF ALL RESPONSES TO PHQ QUESTIONS 1-9: 4
SUM OF ALL RESPONSES TO PHQ QUESTIONS 1-9: 4
10. IF YOU CHECKED OFF ANY PROBLEMS, HOW DIFFICULT HAVE THESE PROBLEMS MADE IT FOR YOU TO DO YOUR WORK, TAKE CARE OF THINGS AT HOME, OR GET ALONG WITH OTHER PEOPLE: NOT DIFFICULT AT ALL

## 2019-12-16 ASSESSMENT — MIFFLIN-ST. JEOR: SCORE: 1849.82

## 2019-12-16 NOTE — PROGRESS NOTES
"Subjective     Kaleigh Nam is a 44 year old female who presents to clinic today for the following health issues:    History of Present Illness        She eats 4 or more servings of fruits and vegetables daily.She consumes 0 sweetened beverage(s) daily.  She is taking medications regularly.     Chronic Pain Follow-Up       Type / Location of Pain: Bilateral leg pain with some joint pain. Heaviness feeling intermittent. Muscle cramping.  Been going to PT for calf strain on R muscle. Did US to r/o blood clot. Sore even though been doing stretches. L knee joint unstable - sometimes shooting pain. L knee worse than R knee but R leg with calf strain.  About a year ago pt has been taking Lexapro. Pt has been having restlessness at night. She reports \"twitches.\" pt decided 3 weeks ago and went down on her dose.  Pt cutting pills in half (10 mg instead of 20mg) The restlessness is still there but better.  Analgesia/pain control:       Recent changes:  Improved with Ibuprofen      Overall control: Tolerable with discomfort  Activity level/function:      Daily activities:  Can do most things most days, with some rest; haven't been able to exercise the way she is used to.    Work:  Pain does not limit any  Work but notes some limping  Adverse effects:  Yes has some falls over a year ago. Changed shoes which helped with decreasing falls. Taking stairs is difficult.  Adherance    Taking medication as directed?  No: cutting pills in half as above    Participating in other treatments: yes, PT  Risk Factors:    Sleep:  Good    Mood/anxiety:  improved    Recent family or social stressors:  none noted    Other aggravating factors: while working including standing and walking and even sitting  PHQ-9 SCORE 12/6/2018 12/13/2018 4/26/2019   PHQ-9 Total Score MyChart - - -   PHQ-9 Total Score 8 2 1     SAMUEL-7 SCORE 12/5/2018 12/6/2018 12/13/2018   Total Score - - -   Total Score 12 (moderate anxiety) - -   Total Score 12 14 3 " "    Encounter-Level CSA:    There are no encounter-level csa.     Patient-Level CSA:    There are no patient-level csa.          She was seen in our Sports Medicine clinic 10/14/19 for 3 week history of worsening right leg pain. US negative for DVT. Normal Xray tibia and fibula  She treated with rest and ice and reported 50% improvement in symptoms at 1 week follow up. 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 Xray at the time showed mild lateral compartment OA of left knee  She takes 800 mg ibuprofen in the morning which is helpful  She also complains of \"restless legs\" which is most bothersome to her in the evening  She thinks this started with taking Lexapro   She decreased dose to 10 mg 3 weeks ago and has noticed a mild improvement in restlessness  Has not noticed any change in depression  She was started on Lexapro approximately 1 year ago  She has going through a stressful time with her son. She is severely disabled and had turned 19 and she was having difficulty receiving benefits for him  This has been worked out  TSH last checked in April. She has gained 15 pounds since then          Patient Active Problem List   Diagnosis     CARDIOVASCULAR SCREENING; LDL GOAL LESS THAN 160     Hypothyroidism, postsurgical     Family history of diabetes mellitus     BMI > 40.0 (H)     Vitamin D deficiency     Recurrent major depressive disorder, in full remission (H)     Strain of calf muscle, right, subsequent encounter     Bilateral knee pain     Past Surgical History:   Procedure Laterality Date     C  DELIVERY " "ONLY      , Low Cervical     C  DELIVERY ONLY      , Low Cervical     C  DELIVERY ONLY      , Low Cervical     HC THYROIDECTOMY         Social History     Tobacco Use     Smoking status: Never Smoker     Smokeless tobacco: Never Used   Substance Use Topics     Alcohol use: Yes     Alcohol/week: 3.0 standard drinks     Types: 3 Standard drinks or equivalent per week     Comment: 1 month     Family History   Problem Relation Age of Onset     Thyroid Disease Mother      Other - See Comments Mother         prediabetes     Osteoporosis Mother      Hypertension Sister      Diabetes Sister      Hypertension Sister      Congenital Anomalies Son         autism, cognitive delay         Current Outpatient Medications   Medication Sig Dispense Refill     DULoxetine (CYMBALTA) 30 MG capsule Take 1 capsule once a day for 1 week then increase to 2 capsules daily 53 capsule 1     cholecalciferol (VITAMIN D3) 85671 UNITS capsule Take 1 capsule (50,000 Units) by mouth once a week 8 capsule 0     escitalopram (LEXAPRO) 20 MG tablet Take 1 tablet (20 mg) by mouth daily 90 tablet 0     levothyroxine (SYNTHROID/LEVOTHROID) 137 MCG tablet TAKE ONE TABLET BY MOUTH EVERY DAY 90 tablet 1     loratadine (CLARITIN) 10 MG tablet Take 10 mg by mouth daily as needed.       BP Readings from Last 3 Encounters:   19 108/72   10/21/19 110/73   10/14/19 118/78    Wt Readings from Last 3 Encounters:   19 119.7 kg (263 lb 14.4 oz)   10/21/19 112.5 kg (248 lb)   10/14/19 112.5 kg (248 lb)                    Reviewed and updated as needed this visit by Provider         Review of Systems   ROS COMP: Constitutional, HEENT, cardiovascular, pulmonary, gi and gu systems are negative, except as otherwise noted.      Objective    /72 (BP Location: Right arm, Patient Position: Sitting, Cuff Size: Adult Large)   Pulse 88   Temp 98.9  F (37.2  C) (Tympanic)   Ht 1.67 m (5' 5.75\")   Wt 119.7 kg " "(263 lb 14.4 oz)   SpO2 98%   BMI 42.92 kg/m    Body mass index is 42.92 kg/m .  Physical Exam   GENERAL: healthy, alert and no distress  RESP: lungs clear to auscultation - no rales, rhonchi or wheezes  CV: regular rate and rhythm, normal S1 S2, no S3 or S4, no murmur, click or rub, no peripheral edema and peripheral pulses strong  MS: Gait intact. ROM bilateral hips, knees and ankles intact. No tenderness plantar fascia. Plantar/dorsiflexion intact. Tenderness distal gastrocnemius muscle. No calf swelling. No patellar instability. Negative laxity.   SKIN: no suspicious lesions or rashes  NEURO: Normal strength and tone, mentation intact and speech normal  PSYCH: mentation appears normal, affect normal/bright    Diagnostic Test Results:  Labs reviewed in Epic        Assessment & Plan       ICD-10-CM    1. Bilateral leg pain M79.604 Basic metabolic panel    M79.605 ORTHO  REFERRAL   2. Restless leg syndrome G25.81 Ferritin   3. Hypothyroidism, postsurgical E89.0    4. Recurrent major depressive disorder, in full remission (H) F33.42 DULoxetine (CYMBALTA) 30 MG capsule   5. Encounter for screening mammogram for breast cancer Z12.31 CANCELED: MA SCREENING DIGITAL BILAT - Future  (s+30)   6. Hypothyroidism, unspecified type E03.9 TSH with free T4 reflex   7. Chronic pain of both knees M25.561     M25.562     G89.29         BMI:   Estimated body mass index is 42.92 kg/m  as calculated from the following:    Height as of this encounter: 1.67 m (5' 5.75\").    Weight as of this encounter: 119.7 kg (263 lb 14.4 oz).   Weight management plan: Discussed healthy diet and exercise guidelines    This does not appear consistent with a rheumatological disorder. She likely has OA of bilateral knees, along with gastro muscle strain and restless legs (whether true RLS or side effect of medication) and symptoms seems worsened since recent weight gain. Strongly recommend weight loss to reduce burden on joints, for mental " wellness and cardiovascular disease prevention. Recommend she f/u at Sports Medicine clinic for MSK complaints  Will try switching from Lexapro for Duloxetine to see if this relieves restlessness which she states start after Lexapro. May also have with chronic pain. Symptoms not entirely consistent with RLS but will check a ferritin and treat if < 45.   Recommend rechecking TSH today as weight gain may have changed medication need      See Patient Instructions    Return in about 1 month (around 1/16/2020) for Follow up.    [facility]    Answers for HPI/ROS submitted by the patient on 12/16/2019   Chronic problems general questions HPI Form  If you checked off any problems, how difficult have these problems made it for you to do your work, take care of things at home, or get along with other people?: Not difficult at all  PHQ9 TOTAL SCORE: 4

## 2019-12-16 NOTE — PATIENT INSTRUCTIONS
Continue with 1 tablet Lexapro for an additional week. During that time, start with 1 capsule Cymbalta for 1 week. Then increase to 2 capsules daily    Follow up in 1 month - ok for E-Visit or phone visit    Schedule with Dr. Cecilia Almanza, sports medicine clinic

## 2019-12-17 LAB
ANION GAP SERPL CALCULATED.3IONS-SCNC: 8 MMOL/L (ref 3–14)
BUN SERPL-MCNC: 20 MG/DL (ref 7–30)
CALCIUM SERPL-MCNC: 8.3 MG/DL (ref 8.5–10.1)
CHLORIDE SERPL-SCNC: 107 MMOL/L (ref 94–109)
CO2 SERPL-SCNC: 24 MMOL/L (ref 20–32)
CREAT SERPL-MCNC: 0.73 MG/DL (ref 0.52–1.04)
FERRITIN SERPL-MCNC: 13 NG/ML (ref 8–252)
GFR SERPL CREATININE-BSD FRML MDRD: >90 ML/MIN/{1.73_M2}
GLUCOSE SERPL-MCNC: 126 MG/DL (ref 70–99)
POTASSIUM SERPL-SCNC: 3.7 MMOL/L (ref 3.4–5.3)
SODIUM SERPL-SCNC: 139 MMOL/L (ref 133–144)
TSH SERPL DL<=0.005 MIU/L-ACNC: 2.9 MU/L (ref 0.4–4)

## 2019-12-17 ASSESSMENT — PATIENT HEALTH QUESTIONNAIRE - PHQ9: SUM OF ALL RESPONSES TO PHQ QUESTIONS 1-9: 4

## 2019-12-18 DIAGNOSIS — R79.0 LOW FERRITIN: Primary | ICD-10-CM

## 2019-12-18 RX ORDER — FERROUS SULFATE 325(65) MG
325 TABLET ORAL
Qty: 90 TABLET | Refills: 3 | Status: SHIPPED | OUTPATIENT
Start: 2019-12-18

## 2019-12-18 NOTE — RESULT ENCOUNTER NOTE
Carin,  You ferritin is very low. This is the level of iron storage in your body. A ferritin level less than 45 corresponds with RLS so I wonder if this may be contributing to your symptoms. I sent a prescription for daily iron to our clinic's pharmacy. Take 1 tablet daily. If it causes constipation or GI upset, you could take every other day which should still bring up your iron level. We should recheck your ferritin in about 2 months.   Your calcium is low. I do not think this is contributing to your symptoms but it is important to be getting adequate calcium in your diet. If you do not eat/drink dairy products you should add a calcium supplement. Taking calcium with your iron supplement can impair absorption of the iron so do not take both together.   See George, CNP

## 2019-12-24 ENCOUNTER — THERAPY VISIT (OUTPATIENT)
Dept: PHYSICAL THERAPY | Facility: CLINIC | Age: 44
End: 2019-12-24
Payer: COMMERCIAL

## 2019-12-24 DIAGNOSIS — G89.29 CHRONIC PAIN OF BOTH KNEES: ICD-10-CM

## 2019-12-24 DIAGNOSIS — S86.811D STRAIN OF CALF MUSCLE, RIGHT, SUBSEQUENT ENCOUNTER: ICD-10-CM

## 2019-12-24 DIAGNOSIS — M25.562 CHRONIC PAIN OF BOTH KNEES: ICD-10-CM

## 2019-12-24 DIAGNOSIS — M25.561 CHRONIC PAIN OF BOTH KNEES: ICD-10-CM

## 2019-12-24 PROCEDURE — 97110 THERAPEUTIC EXERCISES: CPT | Mod: GP | Performed by: PHYSICAL THERAPIST

## 2019-12-24 PROCEDURE — 97140 MANUAL THERAPY 1/> REGIONS: CPT | Mod: GP | Performed by: PHYSICAL THERAPIST

## 2020-01-03 ENCOUNTER — ANCILLARY PROCEDURE (OUTPATIENT)
Dept: GENERAL RADIOLOGY | Facility: CLINIC | Age: 45
End: 2020-01-03
Attending: FAMILY MEDICINE
Payer: COMMERCIAL

## 2020-01-03 ENCOUNTER — OFFICE VISIT (OUTPATIENT)
Dept: ORTHOPEDICS | Facility: CLINIC | Age: 45
End: 2020-01-03
Payer: COMMERCIAL

## 2020-01-03 VITALS
BODY MASS INDEX: 42.27 KG/M2 | SYSTOLIC BLOOD PRESSURE: 118 MMHG | WEIGHT: 263 LBS | DIASTOLIC BLOOD PRESSURE: 78 MMHG | HEIGHT: 66 IN

## 2020-01-03 DIAGNOSIS — M25.562 CHRONIC PAIN OF LEFT KNEE: ICD-10-CM

## 2020-01-03 DIAGNOSIS — G89.29 CHRONIC PAIN OF LEFT KNEE: ICD-10-CM

## 2020-01-03 DIAGNOSIS — M25.562 CHRONIC PAIN OF LEFT KNEE: Primary | ICD-10-CM

## 2020-01-03 DIAGNOSIS — M17.12 PRIMARY OSTEOARTHRITIS OF LEFT KNEE: ICD-10-CM

## 2020-01-03 DIAGNOSIS — G89.29 CHRONIC PAIN OF LEFT KNEE: Primary | ICD-10-CM

## 2020-01-03 PROCEDURE — 20611 DRAIN/INJ JOINT/BURSA W/US: CPT | Mod: LT | Performed by: FAMILY MEDICINE

## 2020-01-03 PROCEDURE — 99214 OFFICE O/P EST MOD 30 MIN: CPT | Mod: 25 | Performed by: FAMILY MEDICINE

## 2020-01-03 PROCEDURE — 73562 X-RAY EXAM OF KNEE 3: CPT | Mod: LT

## 2020-01-03 RX ORDER — METHYLPREDNISOLONE ACETATE 40 MG/ML
40 INJECTION, SUSPENSION INTRA-ARTICULAR; INTRALESIONAL; INTRAMUSCULAR; SOFT TISSUE
Status: DISCONTINUED | OUTPATIENT
Start: 2020-01-03 | End: 2020-10-02

## 2020-01-03 RX ADMIN — METHYLPREDNISOLONE ACETATE 40 MG: 40 INJECTION, SUSPENSION INTRA-ARTICULAR; INTRALESIONAL; INTRAMUSCULAR; SOFT TISSUE at 08:55

## 2020-01-03 ASSESSMENT — MIFFLIN-ST. JEOR: SCORE: 1845.74

## 2020-01-03 NOTE — PROGRESS NOTES
"ASSESSMENT & PLAN    1. Chronic pain of left knee    2. Primary osteoarthritis of left knee      Steroid injection of the left knee was performed today in clinic  Discussed options for changing her health and lifestyle     Follow up for repeat injection when pain returns.     -----    SUBJECTIVE  Kaleigh Nam is a/an 44 year old female who is seen in consultation at the request of  Janeth George C.N.P. for evaluation of left knee pain. The patient is seen by themselves.    Onset: 2-3 month(s) ago. Reports insidious onset without acute precipitating event.  Location of Pain: left medial knee  Rating of Pain at worst: 6/10  Rating of Pain Currently: 4/10  Worsened by: prolonged walking and standing, twisting/pivoting, going up stairs  Better with: some/light activity  Treatments tried: rest/activity avoidance, ibuprofen (800mg BID), home exercises, physical therapy (7 visits) and chiropractic care  Associated symptoms: swelling and feeling of instability  Orthopedic history: YES - h/o chronic bilateral knee pain  Relevant surgical history: YES - L knee arthroscopy, partial lateral meniscectomy, cortisone, DOS 4/6/2016, Dr. Fernandez  Patient Social History: works as a nurse    Patient's past medical, surgical, social, and family histories were reviewed today and no pertinent history related to patient's presenting problem.    REVIEW OF SYSTEMS:  10 point ROS is negative other than symptoms noted above in HPI, Past Medical History or as stated below  Constitutional: NEGATIVE for fever, chills, change in weight  Skin: NEGATIVE for worrisome rashes, moles or lesions  GI/: NEGATIVE for bowel or bladder changes  Neuro: NEGATIVE for weakness, dizziness or paresthesias    OBJECTIVE:  /78   Ht 1.67 m (5' 5.75\")   Wt 119.3 kg (263 lb)   BMI 42.77 kg/m     General: healthy, alert and in no distress  HEENT: no scleral icterus or conjunctival erythema  Skin: no suspicious lesions or rash. No jaundice.  CV: " no pedal edema  Resp: normal respiratory effort without conversational dyspnea   Psych: normal mood and affect  Gait: normal steady gait with appropriate coordination and balance  Neuro: Normal light sensory exam of lower extremity  MSK:  LEFT KNEE  Inspection:    valgus deformity  Palpation:    Tender about the lateral patellar facet, medial patellar facet and medial joint line. Remainder of bony and ligamentous landmarks are nontender.    Mild effusion is present    Patellofemoral crepitus is Present  Range of Motion:     00 extension to 1200 flexion  Strength:    Extensor mechanism intact  Special Tests:    Positive: Patellar grind    Negative: MCL/valgus stress (0 & 30 deg), LCL/varus stress (0 & 30 deg), Lachman's, posterior drawer, Jeaneth's    Independent visualization of the below image:  Recent Results (from the past 24 hour(s))   XR Knee Left 3 Views    Narrative    KNEE LEFT THREE VIEW  1/3/2020 8:26 AM     HISTORY: Chronic pain of left knee.    COMPARISON: July 19, 2018      Impression    IMPRESSION: Mild joint space narrowing of both medial and lateral knee  compartments, without osteophyte formation. Trace knee effusion and  trace patellar osteophytes. Minimal lateral patellofemoral joint space  narrowing. No acute fracture.    JUDSON ELDER MD       Large Joint Injection/Arthocentesis: L knee joint  Date/Time: 1/3/2020 8:55 AM  Performed by: Cecilia Almanza DO  Authorized by: Cecilia Almanza DO     Indications:  Osteoarthritis and pain  Needle Size:  22 G  Guidance: ultrasound    Approach:  Superolateral  Location:  Knee      Medications:  40 mg methylPREDNISolone 40 MG/ML  Aspirate amount (mL):  13  Aspirate:  Serous and yellow  Outcome:  Tolerated well, no immediate complications  Procedure discussed: discussed risks, benefits, and alternatives    Consent Given by:  Patient  Timeout: timeout called immediately prior to procedure    Prep: patient was prepped and draped in usual sterile  fashion       Pain noted to be a 4/10 before completion of the procedure and 0/10 after completion of the procedure.          Cecilia Almanza DO [facility] [place] [facility]

## 2020-01-03 NOTE — LETTER
1/3/2020         RE: Kaleigh Nam  [address]  [place] [address]        Dear Colleague,    Thank you for referring your patient, Kaleigh Nam, to the [facility]. Please see a copy of my visit note below.    ASSESSMENT & PLAN    1. Chronic pain of left knee    2. Primary osteoarthritis of left knee      Steroid injection of the left knee was performed today in clinic  Discussed options for changing her health and lifestyle     Follow up for repeat injection when pain returns.     -----    SUBJECTIVE  Kaleigh Nam is a/an 44 year old female who is seen in consultation at the request of  Janeth George C.N.P. for evaluation of left knee pain. The patient is seen by themselves.    Onset: 2-3 month(s) ago. Reports insidious onset without acute precipitating event.  Location of Pain: left medial knee  Rating of Pain at worst: 6/10  Rating of Pain Currently: 4/10  Worsened by: prolonged walking and standing, twisting/pivoting, going up stairs  Better with: some/light activity  Treatments tried: rest/activity avoidance, ibuprofen (800mg BID), home exercises, physical therapy (7 visits) and chiropractic care  Associated symptoms: swelling and feeling of instability  Orthopedic history: YES - h/o chronic bilateral knee pain  Relevant surgical history: YES - L knee arthroscopy, partial lateral meniscectomy, cortisone, DOS 4/6/2016, Dr. Fernandez  Patient Social History: works as a nurse    Patient's past medical, surgical, social, and family histories were reviewed today and no pertinent history related to patient's presenting problem.    REVIEW OF SYSTEMS:  10 point ROS is negative other than symptoms noted above in HPI, Past Medical History or as stated below  Constitutional: NEGATIVE for fever, chills, change in weight  Skin: NEGATIVE for worrisome rashes, moles or lesions  GI/: NEGATIVE for bowel or bladder changes  Neuro: NEGATIVE for weakness, dizziness or  "paresthesias    OBJECTIVE:  /78   Ht 1.67 m (5' 5.75\")   Wt 119.3 kg (263 lb)   BMI 42.77 kg/m      General: healthy, alert and in no distress  HEENT: no scleral icterus or conjunctival erythema  Skin: no suspicious lesions or rash. No jaundice.  CV: no pedal edema  Resp: normal respiratory effort without conversational dyspnea   Psych: normal mood and affect  Gait: normal steady gait with appropriate coordination and balance  Neuro: Normal light sensory exam of lower extremity  MSK:  LEFT KNEE  Inspection:    valgus deformity  Palpation:    Tender about the lateral patellar facet, medial patellar facet and medial joint line. Remainder of bony and ligamentous landmarks are nontender.    Mild effusion is present    Patellofemoral crepitus is Present  Range of Motion:     00 extension to 1200 flexion  Strength:    Extensor mechanism intact  Special Tests:    Positive: Patellar grind    Negative: MCL/valgus stress (0 & 30 deg), LCL/varus stress (0 & 30 deg), Lachman's, posterior drawer, Jeaneth's    Independent visualization of the below image:  Recent Results (from the past 24 hour(s))   XR Knee Left 3 Views    Narrative    KNEE LEFT THREE VIEW  1/3/2020 8:26 AM     HISTORY: Chronic pain of left knee.    COMPARISON: July 19, 2018      Impression    IMPRESSION: Mild joint space narrowing of both medial and lateral knee  compartments, without osteophyte formation. Trace knee effusion and  trace patellar osteophytes. Minimal lateral patellofemoral joint space  narrowing. No acute fracture.    JUDSON ELDER MD       Large Joint Injection/Arthocentesis: L knee joint  Date/Time: 1/3/2020 8:55 AM  Performed by: Cecilia Almanza DO  Authorized by: Cecilia Almanza DO     Indications:  Osteoarthritis and pain  Needle Size:  22 G  Guidance: ultrasound    Approach:  Superolateral  Location:  Knee      Medications:  40 mg methylPREDNISolone 40 MG/ML  Aspirate amount (mL):  13  Aspirate:  Serous and " yellow  Outcome:  Tolerated well, no immediate complications  Procedure discussed: discussed risks, benefits, and alternatives    Consent Given by:  Patient  Timeout: timeout called immediately prior to procedure    Prep: patient was prepped and draped in usual sterile fashion       Pain noted to be a 4/10 before completion of the procedure and 0/10 after completion of the procedure.          Cecilia Almanza DO [facility]      Again, thank you for allowing me to participate in the care of your patient.        Sincerely,        Cecilia Almanza DO

## 2020-01-03 NOTE — PATIENT INSTRUCTIONS
1. Chronic pain of left knee    2. Primary osteoarthritis of left knee      Steroid injection of the left knee was performed today in clinic    - Would not soak in a hot tub, bath or swimming pool for 48 hours  - Ok to shower  - Ice today and only do your normal amounts of activity  - The lidocaine (what is giving you pain relief right now) will likely stop working in 1-2 hours.  You will then have pain again, similar to before you received the injection. The corticosteroid will not start working until approximately 1-2 weeks from now.  In a small percentage of people, cortisone can cause flushing/redness in the face. This usually lasts for 1-3 days and resolves. Cool compress and Ibuprofen/Tylenol can help if this happens.  Options for changing your health and lifestyle   [employer] [facility]  [facility]  [facility]    Follow up for repeat injection when pain returns.

## 2020-02-17 PROBLEM — M25.562 BILATERAL KNEE PAIN: Status: RESOLVED | Noted: 2019-10-24 | Resolved: 2019-12-24

## 2020-02-17 PROBLEM — S86.811D STRAIN OF CALF MUSCLE, RIGHT, SUBSEQUENT ENCOUNTER: Status: RESOLVED | Noted: 2019-10-24 | Resolved: 2019-12-24

## 2020-02-17 PROBLEM — M25.561 BILATERAL KNEE PAIN: Status: RESOLVED | Noted: 2019-10-24 | Resolved: 2019-12-24

## 2020-02-17 NOTE — PROGRESS NOTES
Discharge Note    Progress reporting period is from initial evaluation date (please see noted date below) to Dec 24, 2019.  No linked episodes      Kaleigh failed to follow up and current status is unknown.  Please see information below for last relevant information on current status.  Patient seen for 7 visits.    SUBJECTIVE  Subjective changes noted by patient:  Still noticing some significant instability in L) knee, this is intermittent. Did a yoga workout, this exacerbated both legs. Feels like L) leg exacerbates the right once it flares up.   .  Current pain level is 1/10.     Previous pain level was  6/10.   Changes in function:  Yes (See Goal flowsheet attached for changes in current functional level)  Adverse reaction to treatment or activity: None    OBJECTIVE  Changes noted in objective findings: some tenderness noted R) calf today - increased vs last visit; still noticing intermittent knee pain, plans to see MD regarding this soon - not appropriate to progress HEP today     ASSESSMENT/PLAN  Diagnosis: R calf strain, B) knee pain   Updated problem list and treatment plan:   Pain - HEP  Decreased strength - HEP  STG/LTGs have been met or progress has been made towards goals:  Yes, please see goal flowsheet for most current information  Assessment of Progress: current status is unknown.    Last current status: Pt is progressing as expected   Self Management Plans:  HEP  I have re-evaluated this patient and find that the nature, scope, duration and intensity of the therapy is appropriate for the medical condition of the patient.  Kaleigh continues to require the following intervention to meet STG and LTG's:  HEP.    Recommendations:  Discharge with current home program.  Patient to follow up with MD as needed.    Please refer to the daily flowsheet for treatment today, total treatment time and time spent performing 1:1 timed codes.

## 2020-03-09 ENCOUNTER — E-VISIT (OUTPATIENT)
Dept: PEDIATRICS | Facility: CLINIC | Age: 45
End: 2020-03-09

## 2020-03-09 DIAGNOSIS — F33.42 RECURRENT MAJOR DEPRESSIVE DISORDER, IN FULL REMISSION (H): ICD-10-CM

## 2020-03-09 DIAGNOSIS — G25.81 RESTLESS LEGS SYNDROME: Primary | ICD-10-CM

## 2020-03-09 PROCEDURE — 99421 OL DIG E/M SVC 5-10 MIN: CPT | Performed by: NURSE PRACTITIONER

## 2020-03-09 RX ORDER — DULOXETIN HYDROCHLORIDE 60 MG/1
CAPSULE, DELAYED RELEASE ORAL
Qty: 90 CAPSULE | Refills: 4 | Status: SHIPPED | OUTPATIENT
Start: 2020-03-09 | End: 2020-10-02

## 2020-03-19 DIAGNOSIS — E89.0 HYPOTHYROIDISM, POSTSURGICAL: ICD-10-CM

## 2020-03-19 RX ORDER — LEVOTHYROXINE SODIUM 137 UG/1
137 TABLET ORAL DAILY
Qty: 90 TABLET | Refills: 1 | Status: SHIPPED | OUTPATIENT
Start: 2020-03-19 | End: 2020-09-01

## 2020-03-19 NOTE — TELEPHONE ENCOUNTER
"Levothyroxine  Last Written Prescription Date:  09/04/2019  Last Fill Quantity: 90,  # refills: 1   Last office visit: 12/16/2019 with prescribing provider:  Ariel   Future Office Visit:   Next 5 appointments (look out 90 days)    Jun 01, 2020  9:20 AM CDT  (Arrive by 8:55 AM)  Adult Preventative Visit with Dusty Bang MD  [facility] [address] [place] [address]  [address] [place] [id number] [phone]      Prescription approved per [facility] Refill Protocol.    Requested Prescriptions   Pending Prescriptions Disp Refills     levothyroxine (SYNTHROID/LEVOTHROID) 137 MCG tablet 90 tablet 1     Sig: Take 1 tablet (137 mcg) by mouth daily       Thyroid Protocol Passed - 3/19/2020  2:28 PM        Passed - Patient is 12 years or older        Passed - Recent (12 mo) or future (30 days) visit within the authorizing provider's specialty     Patient has had an office visit with the authorizing provider or a provider within the authorizing providers department within the previous 12 mos or has a future within next 30 days. See \"Patient Info\" tab in inbasket, or \"Choose Columns\" in Meds & Orders section of the refill encounter.          Passed - Medication is active on med list        Passed - Normal TSH on file in past 12 months     Recent Labs   Lab Test 12/16/19  1457   TSH 2.90           Passed - No active pregnancy on record     If patient is pregnant or has had a positive pregnancy test, please check TSH.        Passed - No positive pregnancy test in past 12 months     If patient is pregnant or has had a positive pregnancy test, please check TSH.         Joellen Gomes RN   "

## 2020-05-19 ENCOUNTER — TELEPHONE (OUTPATIENT)
Dept: PEDIATRICS | Facility: CLINIC | Age: 45
End: 2020-05-19

## 2020-05-19 NOTE — TELEPHONE ENCOUNTER
Left non detailed message for pt to call clinic.  Phone number left in message.  When pt calls please assist in scheduling a telehealth visit for a med check, and a physical in the fall.    Verónica Bailey     11:44 AM May 19, 2020

## 2020-06-08 DIAGNOSIS — E89.0 HYPOTHYROIDISM, POSTSURGICAL: ICD-10-CM

## 2020-06-08 NOTE — TELEPHONE ENCOUNTER
Pending Prescriptions:                       Disp   Refills    levothyroxine (SYNTHROID/LEVOTHROID) 137 *90 tab*1            Sig: Take 1 tablet (137 mcg) by mouth daily

## 2020-06-09 RX ORDER — LEVOTHYROXINE SODIUM 137 UG/1
137 TABLET ORAL DAILY
Qty: 90 TABLET | Refills: 1 | OUTPATIENT
Start: 2020-06-09

## 2020-09-01 ENCOUNTER — VIRTUAL VISIT (OUTPATIENT)
Dept: FAMILY MEDICINE | Facility: OTHER | Age: 45
End: 2020-09-01

## 2020-09-01 DIAGNOSIS — E89.0 HYPOTHYROIDISM, POSTSURGICAL: ICD-10-CM

## 2020-09-01 DIAGNOSIS — Z20.822 SUSPECTED COVID-19 VIRUS INFECTION: ICD-10-CM

## 2020-09-01 DIAGNOSIS — Z20.822 SUSPECTED COVID-19 VIRUS INFECTION: Primary | ICD-10-CM

## 2020-09-01 PROCEDURE — U0003 INFECTIOUS AGENT DETECTION BY NUCLEIC ACID (DNA OR RNA); SEVERE ACUTE RESPIRATORY SYNDROME CORONAVIRUS 2 (SARS-COV-2) (CORONAVIRUS DISEASE [COVID-19]), AMPLIFIED PROBE TECHNIQUE, MAKING USE OF HIGH THROUGHPUT TECHNOLOGIES AS DESCRIBED BY CMS-2020-01-R: HCPCS | Performed by: FAMILY MEDICINE

## 2020-09-01 RX ORDER — LEVOTHYROXINE SODIUM 137 UG/1
TABLET ORAL
Qty: 90 TABLET | Refills: 0 | Status: SHIPPED | OUTPATIENT
Start: 2020-09-01 | End: 2020-10-02

## 2020-09-01 NOTE — TELEPHONE ENCOUNTER
Prescription approved per [facility] Refill Protocol.    Miroslava Frazier RN

## 2020-09-01 NOTE — PROGRESS NOTES
"Date: 2020 11:16:12  Clinician: Mauro Singh  Clinician NPI: [id number]  Patient: Kaleigh Nam  Patient : 1975  Patient Address: [address] [place], [place] [address]  Patient Phone: [phone]  Visit Protocol: URI  Patient Summary:  Kaleigh is a 45 year old ( : 1975 ) female who initiated a Visit for COVID-19 (Coronavirus) evaluation and screening. When asked the question \"Please sign me up to receive news, health information and promotions. \", Kaleigh responded \"Yes\".    Kaleigh states her symptoms started gradually 5-6 days ago.   Her symptoms consist of chills, malaise, enlarged lymph nodes, a sore throat, a cough, nasal congestion, rhinitis, nausea, and myalgia.   Symptom details     Nasal secretions: The color of her mucus is clear.    Cough: Kaleigh coughs a few times an hour and her cough is not more bothersome at night. Phlegm does not come into her throat when she coughs. She believes her cough is caused by post-nasal drip.     Sore throat: Kaleigh reports having mild throat pain (1-3 on a 10 point pain scale), does not have exudate on her tonsils, and can swallow liquids. The lymph nodes in her neck are enlarged. A rash has not appeared on the skin since the sore throat started.      Kaleigh denies having ear pain, headache, fever, wheezing, teeth pain, ageusia, diarrhea, vomiting, anosmia, and facial pain or pressure. She also denies having recent facial or sinus surgery in the past 60 days, taking antibiotic medication in the past month, and double sickening (worsening symptoms after initial improvement). She is not experiencing dyspnea.   Precipitating events  Within the past week, Kaleigh has not been exposed to someone with strep throat. She has not recently been exposed to someone with influenza. Kaleigh has been in close contact with the following high risk individuals: immunocompromised people, children under the age of 5, and adults 65 or older.   Pertinent " COVID-19 (Coronavirus) information  In the past 14 days, Kaleigh has not worked in a congregate living setting.   She either works or volunteers as a healthcare worker or a , or works or volunteers in a healthcare facility. 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 She lives with a healthcare worker.   Kaleigh has not had a close contact with a laboratory-confirmed COVID-19 patient within 14 days of symptom onset.   Since December 2019, Kaleigh and has not had upper respiratory infection or influenza-like illness. Has not been diagnosed with lab-confirmed COVID-19 test   Pertinent medical history  Kaleigh typically gets a yeast infection when she takes antibiotics. She has used fluconazole (Diflucan) to treat previous yeast infections. 1 dose of fluconazole (Diflucan) has typically been sufficient for symptoms to resolve in the past.   Kaleigh needs a return to work/school note.   Weight: 245 lbs   Kaleigh does not smoke or use smokeless tobacco.   She denies pregnancy and denies breastfeeding. She has menstruated in the past month.   Additional information as reported by the patient (free text): Swollen gland 1/2 in by 1/2 in in eight groin   Weight: 245 lbs    MEDICATIONS: Synthroid oral, duloxetine oral, loratadine oral, ibuprofen oral, ALLERGIES: NKDA  Clinician Response:  Dear Kaleigh,   Your symptoms show that you may have coronavirus (COVID-19). This illness can cause fever, cough and trouble breathing. Many people get a mild case and get better on their own. Some people can get very sick.  What should I do?  We would like to test you for this virus.   1. Please call [phone] to schedule your visit. Explain that you were referred by [facility] to have a COVID-19 test. Be ready to share your [facility] visit ID number.  The following will serve as your  "written order for this COVID Test, ordered by me, for the indication of suspected COVID [Z20.828]: The test will be ordered in [facility], our electronic health record, after you are scheduled. It will show as ordered and authorized by Papa Cazares MD.  Order: COVID-19 (Coronavirus) PCR [facility]TOM[facility]IC [facility]      2. When it's time for your COVID test:  Stay at least 6 feet away from others. (If someone will drive you to your test, stay in the backseat, as far away from the  as you can.)   Cover your mouth and nose with a mask, tissue or washcloth.  Go straight to the testing site. Don't make any stops on the way there or back.      3.Starting now: Stay home and away from others (self-isolate) until:   You've had no fever---and no medicine that reduces fever---for one full day (24 hours). And...   Your other symptoms have gotten better. For example, your cough or breathing has improved. And...   At least 10 days have passed since your symptoms started.       During this time, don't leave the house except for testing or medical care.   Stay in your own room, even for meals. Use your own bathroom if you can.   Stay away from others in your home. No hugging, kissing or shaking hands. No visitors.  Don't go to work, school or anywhere else.    Clean \"high touch\" surfaces often (doorknobs, counters, handles, etc.). Use a household cleaning spray or wipes. You'll find a full list of  on the EPA website: www.epa.gov/pesticide-registration/list-n-disinfectants-use-against-sars-cov-2.   Cover your mouth and nose with a mask, tissue or washcloth to avoid spreading germs.  Wash your hands and face often. Use soap and water.  Caregivers in these groups are at risk for severe illness due to COVID-19:  o People 65 years and older  o People who live in a nursing home or long-term care facility  o People with chronic disease (lung, heart, cancer, diabetes, kidney, liver, immunologic)  o People who have a " weakened immune system, including those who:   Are in cancer treatment  Take medicine that weakens the immune system, such as corticosteroids  Had a bone marrow or organ transplant  Have an immune deficiency  Have poorly controlled HIV or AIDS  Are obese (body mass index of 40 or higher)  Smoke regularly   o Caregivers should wear gloves while washing dishes, handling laundry and cleaning bedrooms and bathrooms.  o Use caution when washing and drying laundry: Don't shake dirty laundry, and use the warmest water setting that you can.  o For more tips, go to www.cdc.gov/coronavirus/2019-ncov/downloads/10Things.pdf.    4.Sign up for [employer]. We know it's scary to hear that you might have COVID-19. We want to track your symptoms to make sure you're okay over the next 2 weeks. Please look for an email from [employer]---this is a free, online program that we'll use to keep in touch. To sign up, follow the link in the email. Learn more at [email or web address]  How can I take care of myself?   Get lots of rest. Drink extra fluids (unless a doctor has told you not to).   Take Tylenol (acetaminophen) for fever or pain. If you have liver or kidney problems, ask your family doctor if it's okay to take Tylenol.   Adults can take either:    650 mg (two 325 mg pills) every 4 to 6 hours, or...   1,000 mg (two 500 mg pills) every 8 hours as needed.    Note: Don't take more than 3,000 mg in one day. Acetaminophen is found in many medicines (both prescribed and over-the-counter medicines). Read all labels to be sure you don't take too much.   For children, check the Tylenol bottle for the right dose. The dose is based on the child's age or weight.    If you have other health problems (like cancer, heart failure, an organ transplant or severe kidney disease): Call your specialty clinic if you don't feel better in the next 2 days.       Know when to call 911. Emergency warning signs include:    Trouble breathing or  shortness of breath Pain or pressure in the chest that doesn't go away Feeling confused like you haven't felt before, or not being able to wake up Bluish-colored lips or face.  Where can I get more information?   [facility] -- About COVID-19: [email or web address]   CDC -- What to Do If You're Sick: www.cdc.gov/coronavirus/2019-ncov/about/steps-when-sick.html   CDC -- Ending Home Isolation: www.cdc.gov/coronavirus/2019-ncov/hcp/disposition-in-home-patients.html   [facility] -- Caring for Someone: www.cdc.gov/coronavirus/2019-ncov/if-you-are-sick/care-for-someone.html   [facility] -- Interim Guidance for Hospital Discharge to Home: www.[facility].Pending sale to [facility].mn.us/diseases/coronavirus/hcp/hospdischarge.pdf   [facility] clinical trials (COVID-19 research studies): clinicalaffairs.[facility]-clinical-trials    Below are the COVID-19 hotlines at the [facility] ([facility]). Interpreters are available.    For health questions: Call [phone] or [phone] (7 a.m. to 7 p.m.) For questions about schools and childcare: Call [phone] or [phone] (7 a.m. to 7 p.m.)    Diagnosis: Nasal congestion  Diagnosis ICD: R09.81

## 2020-09-02 LAB
SARS-COV-2 RNA SPEC QL NAA+PROBE: NOT DETECTED
SPECIMEN SOURCE: NORMAL

## 2020-10-01 NOTE — PROGRESS NOTES
SUBJECTIVE:   CC: Kaleigh Nam is an 45 year old woman who presents for preventive health visit.       Patient has been advised of split billing requirements and indicates understanding: Yes  Healthy Habits:    Do you get at least three servings of calcium containing foods daily (dairy, green leafy vegetables, etc.)? yes    Amount of exercise or daily activities, outside of work: None, has been moving and doing things around the house, no dedicated exercise time     Problems taking medications regularly No    Medication side effects: No    Have you had an eye exam in the past two years? no    Do you see a dentist twice per year? yes    Do you have sleep apnea, excessive snoring or daytime drowsiness?no    Dizziness    Duration: few days     Description   Feeling faint:  YES  Feeling like the surroundings are moving: YES  Loss of consciousness or falls: no     Intensity:  moderate    Accompanying signs and symptoms:   Nausea/vomitting: no   Palpitations: YES  Weakness in arms or legs: YES- in legs  Vision or speech changes: no   Ringing in ears (Tinnitus): no   Hearing loss related to dizziness: no   Other (fevers/chills/sweating/dyspnea): no     History (similar episodes/head trauma/previous evaluation/recent bleeding): None    Precipitating or alleviating factors (new meds/chemicals): Standing up quickly   Worse with activity/head movement: YES    Therapies tried and outcome: None    Right side of cheek flushed red    # Lower extremity swelling after work  - Patient is requesting a referral for compression tights for work. Insurance might cover one pair a year.     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 virtual   -- 18 yo at [employer]     # Restless legs  - got so much better with change from lexapro to cymbalta      # Mental health  PHQ 4/26/2019 12/16/2019 10/2/2020   PHQ-9 Total Score 1 4 4   Q9: Thoughts of better off dead/self-harm past 2 weeks Not at all Not at  all Not at all   F/U: Thoughts of suicide or self-harm - - -   F/U: Safety concerns - - -     SAMUEL-7 SCORE 2018 2018 10/2/2020   Total Score - - -   Total Score - - -   Total Score 14 3 4   - still doing therapy    # hypothyroid  - dose stable    # Weight  Wt Readings from Last 4 Encounters:   10/02/20 118.2 kg (260 lb 9.6 oz)   20 119.3 kg (263 lb)   19 119.7 kg (263 lb 14.4 oz)   10/21/19 112.5 kg (248 lb)     Didn't eat as well this summer    Today's PHQ-2 Score:   PHQ-2 (  [employer]) 2019   Q1: Little interest or pleasure in doing things 0 0   Q2: Feeling down, depressed or hopeless 0 0   PHQ-2 Score 0 0   Q1: Little interest or pleasure in doing things Not at all Not at all   Q2: Feeling down, depressed or hopeless Not at all Not at all   PHQ-2 Score 0 0     Abuse: Current or Past(Physical, Sexual or Emotional)- No  Do you feel safe in your environment? Yes    Social History     Tobacco Use     Smoking status: Never Smoker     Smokeless tobacco: Never Used   Substance Use Topics     Alcohol use: Yes     Alcohol/week: 3.0 standard drinks     Types: 3 Standard drinks or equivalent per week     Comment: 1 month     If you drink alcohol do you typically have >3 drinks per day or >7 drinks per week? No                     Reviewed orders with patient.  Reviewed health maintenance and updated orders accordingly - Yes  Patient Active Problem List   Diagnosis     CARDIOVASCULAR SCREENING; LDL GOAL LESS THAN 160     Hypothyroidism, postsurgical     Family history of diabetes mellitus     BMI > 40.0 (H)     Vitamin D deficiency     Recurrent major depressive disorder, in full remission (H)     Restless legs syndrome     Past Surgical History:   Procedure Laterality Date     C  DELIVERY ONLY      , Low Cervical     C  DELIVERY ONLY      , Low Cervical     C  DELIVERY ONLY      , Low Cervical     HC THYROIDECTOMY         Social  History     Tobacco Use     Smoking status: Never Smoker     Smokeless tobacco: Never Used   Substance Use Topics     Alcohol use: Yes     Alcohol/week: 3.0 standard drinks     Types: 3 Standard drinks or equivalent per week     Comment: 1 month     Family History   Problem Relation Age of Onset     Thyroid Disease Mother      Other - See Comments Mother         prediabetes     Osteoporosis Mother      Hypertension Sister      Diabetes Sister      Other - See Comments Sister         Lipadema      Hypertension Sister      Congenital Anomalies Son         autism, cognitive delay         Current Outpatient Medications   Medication Sig Dispense Refill     DULoxetine (CYMBALTA) 60 MG capsule TAKE ONE CAPSULE BY MOUTH ONCE DAILY 90 capsule 3     levothyroxine (SYNTHROID/LEVOTHROID) 137 MCG tablet Take 1 tablet (137 mcg) by mouth daily 90 tablet 3     ferrous sulfate (FEROSUL) 325 (65 Fe) MG tablet Take 1 tablet (325 mg) by mouth daily (with breakfast) 90 tablet 3     loratadine (CLARITIN) 10 MG tablet Take 10 mg by mouth daily as needed.       Allergies   Allergen Reactions     No Known Drug Allergies        Mammogram Screening: Patient under age 50, mutual decision reflected in health maintenance.      Pertinent mammograms are reviewed under the imaging tab.  History of abnormal Pap smear: NO - age 30-65 PAP every 5 years with negative HPV co-testing recommended  PAP / HPV Latest Ref Rng & Units 6/22/2017 7/24/2014 12/4/2007   PAP - NIL NIL NIL   HPV 16 DNA NEG Negative - -   HPV 18 DNA NEG Negative - -   OTHER HR HPV NEG Negative - -     Reviewed and updated as needed this visit by clinical staff  Tobacco  Allergies  Meds   Med Hx  Surg Hx  Fam Hx  Soc Hx        Reviewed and updated as needed this visit by Provider    Meds             Past Medical History:   Diagnosis Date     Allergic rhinitis, cause unspecified      Anxiety state, unspecified      Graves's Disease      Moderate episode of recurrent major  depressive disorder (H) 2018     Multinodular Goiter      Severe pre-eclampsia, unspecified as to episode of care       Past Surgical History:   Procedure Laterality Date     C  DELIVERY ONLY      , Low Cervical     C  DELIVERY ONLY      , Low Cervical     C  DELIVERY ONLY      , Low Cervical     HC THYROIDECTOMY         ROS:  CONSTITUTIONAL: NEGATIVE for fever, chills, change in weight  INTEGUMENTARU/SKIN: NEGATIVE for worrisome rashes, moles or lesions  EYES: NEGATIVE for vision changes or irritation  ENT: NEGATIVE for ear, mouth and throat problems  RESP: NEGATIVE for significant cough or SOB  BREAST: NEGATIVE for masses, tenderness or discharge  CV: NEGATIVE for chest pain, palpitations or peripheral edema  GI: NEGATIVE for nausea, abdominal pain, heartburn, or change in bowel habits  : NEGATIVE for unusual urinary or vaginal symptoms. Periods are regular.  MUSCULOSKELETAL: NEGATIVE for significant arthralgias or myalgia  NEURO: NEGATIVE for weakness, dizziness or paresthesias  PSYCHIATRIC: NEGATIVE for changes in mood or affect    OBJECTIVE:   /70 (BP Location: Right arm, Patient Position: Sitting, Cuff Size: Adult Large)   Pulse 82   Temp 98.6  F (37  C) (Tympanic)   Wt 118.2 kg (260 lb 9.6 oz)   SpO2 98%   BMI 42.38 kg/m    EXAM:  GENERAL: healthy, alert and no distress  EYES: Eyes grossly normal to inspection, PERRL and conjunctivae and sclerae normal  HENT: ear canals and TM's normal, nose and mouth without ulcers or lesions  NECK: no adenopathy, no asymmetry, masses, or scars and thyroid normal to palpation  RESP: lungs clear to auscultation - no rales, rhonchi or wheezes  CV: regular rate and rhythm, normal S1 S2, no S3 or S4, no murmur, click or rub, no peripheral edema and peripheral pulses strong  ABDOMEN: soft, nontender, no hepatosplenomegaly, no masses and bowel sounds normal  MS: no gross musculoskeletal defects noted, no  "edema  SKIN: no suspicious lesions or rashes  NEURO: Normal strength and tone, mentation intact and speech normal  PSYCH: mentation appears normal, affect normal/bright    Diagnostic Test Results:  In process    ASSESSMENT/PLAN:   1. Routine general medical examination at a health care facility  - utd pap  - ytd vaccines  - mammo ordered  - colon at 48    2. BMI > 40.0 (H)  Working on diet/exercise    3. Hypothyroidism, postsurgical  - TSH with free T4 reflex  - levothyroxine (SYNTHROID/LEVOTHROID) 137 MCG tablet; Take 1 tablet (137 mcg) by mouth daily  Dispense: 90 tablet; Refill: 3    4. Recurrent major depressive disorder, in full remission (H)  Well controlled. Continue current meds. Continue w/ therapist.  - DULoxetine (CYMBALTA) 60 MG capsule; TAKE ONE CAPSULE BY MOUTH ONCE DAILY  Dispense: 90 capsule; Refill: 3    5. Restless legs syndrome  Takes iron not every day. Hard to remember.   RLS better.  - Ferritin    6. Vitamin D deficiency  - Vitamin D Deficiency    7. Swelling of lower extremity  After long days of being on her feet at work.  - Compression Sleeve/Stocking Order    8. Screening cholesterol level  - Lipid panel reflex to direct LDL Fasting    9. Screening for diabetes mellitus  - Comprehensive metabolic panel (BMP + Alb, Alk Phos, ALT, AST, Total. Bili, TP)    10. Encounter for screening mammogram for breast cancer  - MA Screening Digital Bilateral; Future    COUNSELING:   Reviewed preventive health counseling, as reflected in patient instructions       Regular exercise       Healthy diet/nutrition       Immunizations       Osteoporosis Prevention/Bone Health    Estimated body mass index is 42.38 kg/m  as calculated from the following:    Height as of 1/3/20: 1.67 m (5' 5.75\").    Weight as of this encounter: 118.2 kg (260 lb 9.6 oz).    Weight management plan: Discussed healthy diet and exercise guidelines    She reports that she has never smoked. She has never used smokeless " tobacco.    Counseling Resources:  ATP IV Guidelines  Pooled Cohorts Equation Calculator  Breast Cancer Risk Calculator  BRCA-Related Cancer Risk Assessment: FHS-7 Tool  FRAX Risk Assessment  ICSI Preventive Guidelines  Dietary Guidelines for Americans, 2010  USDA's MyPlate  ASA Prophylaxis  Lung CA Screening    Dusty Bang MD  [facility] [place]  DME (Durable Medical Equipment) Orders and Documentation  Orders Placed This Encounter   Procedures     Compression Sleeve/Stocking Order      The patient was assessed and it was determined the patient is in need of the following listed DME Supplies/Equipment. Please complete supporting documentation below to demonstrate medical necessity.      Compression Sleeve/Stocking(s) Supplies Documentation  The patient needs compression stockings for lower extremity edema.

## 2020-10-01 NOTE — PATIENT INSTRUCTIONS
Keep in touch about the dizziness.   [place] - exercise    Winter - walking & skiing    Preventive Health Recommendations  Female Ages 40 to 49    Yearly exam:     See your health care provider every year in order to  1. Review health changes.   2. Discuss preventive care.    3. Review your medicines if your doctor prescribed any.      Get a Pap test every three years (unless you have an abnormal result and your provider advises testing more often).      If you get Pap tests with HPV test, you only need to test every 5 years, unless you have an abnormal result. You do not need a Pap test if your uterus was removed (hysterectomy) and you have not had cancer.      You should be tested each year for STDs (sexually transmitted diseases), if you're at risk.     Ask your doctor if you should have a mammogram.      Have a colonoscopy (test for colon cancer) if someone in your family has had colon cancer or polyps before age 50.       Have a cholesterol test every 5 years.       Have a diabetes test (fasting glucose) after age 45. If you are at risk for diabetes, you should have this test every 3 years.    Shots: Get a flu shot each year. Get a tetanus shot every 10 years.     Nutrition:     Eat at least 5 servings of fruits and vegetables each day.    Eat whole-grain bread, whole-wheat pasta and brown rice instead of white grains and rice.    Get adequate Calcium and Vitamin D.      Lifestyle    Exercise at least 150 minutes a week (an average of 30 minutes a day, 5 days a week). This will help you control your weight and prevent disease.    Limit alcohol to one drink per day.    No smoking.     Wear sunscreen to prevent skin cancer.    See your dentist every six months for an exam and cleaning.

## 2020-10-02 ENCOUNTER — OFFICE VISIT (OUTPATIENT)
Dept: PEDIATRICS | Facility: CLINIC | Age: 45
End: 2020-10-02
Payer: COMMERCIAL

## 2020-10-02 VITALS
BODY MASS INDEX: 42.38 KG/M2 | TEMPERATURE: 98.6 F | WEIGHT: 260.6 LBS | OXYGEN SATURATION: 98 % | DIASTOLIC BLOOD PRESSURE: 70 MMHG | HEART RATE: 82 BPM | SYSTOLIC BLOOD PRESSURE: 110 MMHG

## 2020-10-02 DIAGNOSIS — F33.42 RECURRENT MAJOR DEPRESSIVE DISORDER, IN FULL REMISSION (H): ICD-10-CM

## 2020-10-02 DIAGNOSIS — E89.0 HYPOTHYROIDISM, POSTSURGICAL: ICD-10-CM

## 2020-10-02 DIAGNOSIS — E66.01 MORBID OBESITY (H): ICD-10-CM

## 2020-10-02 DIAGNOSIS — E55.9 VITAMIN D DEFICIENCY: ICD-10-CM

## 2020-10-02 DIAGNOSIS — M79.89 SWELLING OF LOWER EXTREMITY: ICD-10-CM

## 2020-10-02 DIAGNOSIS — Z12.31 ENCOUNTER FOR SCREENING MAMMOGRAM FOR BREAST CANCER: ICD-10-CM

## 2020-10-02 DIAGNOSIS — G25.81 RESTLESS LEGS SYNDROME: ICD-10-CM

## 2020-10-02 DIAGNOSIS — Z13.220 SCREENING CHOLESTEROL LEVEL: ICD-10-CM

## 2020-10-02 DIAGNOSIS — Z13.1 SCREENING FOR DIABETES MELLITUS: ICD-10-CM

## 2020-10-02 DIAGNOSIS — Z00.00 ROUTINE GENERAL MEDICAL EXAMINATION AT A HEALTH CARE FACILITY: Primary | ICD-10-CM

## 2020-10-02 LAB
ALBUMIN SERPL-MCNC: 3.2 G/DL (ref 3.4–5)
ALP SERPL-CCNC: 83 U/L (ref 40–150)
ALT SERPL W P-5'-P-CCNC: 19 U/L (ref 0–50)
ANION GAP SERPL CALCULATED.3IONS-SCNC: 11 MMOL/L (ref 3–14)
AST SERPL W P-5'-P-CCNC: 12 U/L (ref 0–45)
BILIRUB SERPL-MCNC: 0.6 MG/DL (ref 0.2–1.3)
BUN SERPL-MCNC: 16 MG/DL (ref 7–30)
CALCIUM SERPL-MCNC: 8.5 MG/DL (ref 8.5–10.1)
CHLORIDE SERPL-SCNC: 107 MMOL/L (ref 94–109)
CHOLEST SERPL-MCNC: 188 MG/DL
CO2 SERPL-SCNC: 21 MMOL/L (ref 20–32)
CREAT SERPL-MCNC: 0.76 MG/DL (ref 0.52–1.04)
DEPRECATED CALCIDIOL+CALCIFEROL SERPL-MC: 29 UG/L (ref 20–75)
FERRITIN SERPL-MCNC: 17 NG/ML (ref 8–252)
GFR SERPL CREATININE-BSD FRML MDRD: >90 ML/MIN/{1.73_M2}
GLUCOSE SERPL-MCNC: 86 MG/DL (ref 70–99)
HDLC SERPL-MCNC: 73 MG/DL
LDLC SERPL CALC-MCNC: 103 MG/DL
NONHDLC SERPL-MCNC: 115 MG/DL
POTASSIUM SERPL-SCNC: 3.9 MMOL/L (ref 3.4–5.3)
PROT SERPL-MCNC: 7.5 G/DL (ref 6.8–8.8)
SODIUM SERPL-SCNC: 139 MMOL/L (ref 133–144)
TRIGL SERPL-MCNC: 60 MG/DL
TSH SERPL DL<=0.005 MIU/L-ACNC: 2.38 MU/L (ref 0.4–4)

## 2020-10-02 PROCEDURE — 80061 LIPID PANEL: CPT | Performed by: INTERNAL MEDICINE

## 2020-10-02 PROCEDURE — 82728 ASSAY OF FERRITIN: CPT | Performed by: INTERNAL MEDICINE

## 2020-10-02 PROCEDURE — 84443 ASSAY THYROID STIM HORMONE: CPT | Performed by: INTERNAL MEDICINE

## 2020-10-02 PROCEDURE — 82306 VITAMIN D 25 HYDROXY: CPT | Performed by: INTERNAL MEDICINE

## 2020-10-02 PROCEDURE — 99213 OFFICE O/P EST LOW 20 MIN: CPT | Mod: 25 | Performed by: INTERNAL MEDICINE

## 2020-10-02 PROCEDURE — 99396 PREV VISIT EST AGE 40-64: CPT | Performed by: INTERNAL MEDICINE

## 2020-10-02 PROCEDURE — 80053 COMPREHEN METABOLIC PANEL: CPT | Performed by: INTERNAL MEDICINE

## 2020-10-02 PROCEDURE — 96127 BRIEF EMOTIONAL/BEHAV ASSMT: CPT | Performed by: INTERNAL MEDICINE

## 2020-10-02 PROCEDURE — 36415 COLL VENOUS BLD VENIPUNCTURE: CPT | Performed by: INTERNAL MEDICINE

## 2020-10-02 RX ORDER — LEVOTHYROXINE SODIUM 137 UG/1
137 TABLET ORAL DAILY
Qty: 90 TABLET | Refills: 3 | Status: SHIPPED | OUTPATIENT
Start: 2020-10-02 | End: 2021-11-16

## 2020-10-02 RX ORDER — DULOXETIN HYDROCHLORIDE 60 MG/1
CAPSULE, DELAYED RELEASE ORAL
Qty: 90 CAPSULE | Refills: 3 | Status: SHIPPED | OUTPATIENT
Start: 2020-10-02 | End: 2021-11-04

## 2020-10-02 ASSESSMENT — ANXIETY QUESTIONNAIRES
6. BECOMING EASILY ANNOYED OR IRRITABLE: SEVERAL DAYS
IF YOU CHECKED OFF ANY PROBLEMS ON THIS QUESTIONNAIRE, HOW DIFFICULT HAVE THESE PROBLEMS MADE IT FOR YOU TO DO YOUR WORK, TAKE CARE OF THINGS AT HOME, OR GET ALONG WITH OTHER PEOPLE: SOMEWHAT DIFFICULT
3. WORRYING TOO MUCH ABOUT DIFFERENT THINGS: NOT AT ALL
1. FEELING NERVOUS, ANXIOUS, OR ON EDGE: SEVERAL DAYS
7. FEELING AFRAID AS IF SOMETHING AWFUL MIGHT HAPPEN: SEVERAL DAYS
GAD7 TOTAL SCORE: 4
2. NOT BEING ABLE TO STOP OR CONTROL WORRYING: SEVERAL DAYS
5. BEING SO RESTLESS THAT IT IS HARD TO SIT STILL: NOT AT ALL

## 2020-10-02 ASSESSMENT — PATIENT HEALTH QUESTIONNAIRE - PHQ9
SUM OF ALL RESPONSES TO PHQ QUESTIONS 1-9: 4
5. POOR APPETITE OR OVEREATING: NOT AT ALL

## 2020-10-03 ASSESSMENT — ANXIETY QUESTIONNAIRES: GAD7 TOTAL SCORE: 4

## 2020-11-17 ENCOUNTER — E-VISIT (OUTPATIENT)
Dept: URGENT CARE | Facility: CLINIC | Age: 45
End: 2020-11-17
Payer: COMMERCIAL

## 2020-11-17 DIAGNOSIS — J02.9 SORE THROAT: ICD-10-CM

## 2020-11-17 DIAGNOSIS — Z20.822 SUSPECTED COVID-19 VIRUS INFECTION: ICD-10-CM

## 2020-11-17 PROCEDURE — 99421 OL DIG E/M SVC 5-10 MIN: CPT | Performed by: EMERGENCY MEDICINE

## 2020-11-17 NOTE — PATIENT INSTRUCTIONS
Dear Kaleigh Nam,    Your symptoms show that you may have coronavirus (COVID-19). This illness can cause fever, cough and trouble breathing. Many people get a mild case and get better on their own. Some people can get very sick.    Because you also reported sore throat I would like to also test you for Strep Throat to determine if we need to treat you for that as well.    What should I do?  We would like to test you for Covid-19 virus and Strep Throat. I have placed orders for these tests.     For all employees or close contacts (except [employer] and [employer] - see below), go to your [employer] home page and scroll down to the section that says  You have an appointment that needs to be scheduled  and click the large green button that says  Schedule Now  and follow the steps to find the next available opening. PLEASE Mention when asked what you are scheduling for that you need BOTH Covid and Strep tests.   If you are unable to complete these steps or if you cannot find any available times, please call [phone] to schedule employee testing.       [employer] employees or close contacts, please call [phone].   [place] ([employer]) employees or close contacts call [phone].      When it's time for your COVID and Strep test:  Stay at least 6 feet away from others. (If someone will drive you to your test, stay in the backseat, as far away from the  as you can.)  Cover your mouth and nose with a mask, tissue or washcloth.  Go straight to the testing site. Don't make any stops on the way there or back.    Starting now:     Do not go to work.   o If you receive a negative COVID-19 test result and were NOT exposed to someone with a known positive COVID-19 test, you can return to work once you're free of fever for 24 hours without fever-reducing medication and your symptoms are improving or resolved.  o If you receive a positive COVID-19 test result, you must be cleared by Employee Occupational Health and  "Safety to return to work.   o If you were exposed to someone who has tested positive for COVID-19, you can return to work 14 days after your last contact with the positive individual, provided you do not have symptoms at all during that time. In some cases, your manager may ask you to come back sooner than 14 days.     During this time, don't leave the house except for testing or medical care.  o Stay in your own room, even for meals. Use your own bathroom if you can.  o Stay away from others in your home. No hugging, kissing or shaking hands. No visitors.  o Don't go to work, school or anywhere else.    Clean \"high touch\" surfaces often (doorknobs, counters, handles, etc.). Use a household cleaning spray or wipes. You'll find a full list of  on the EPA website: www.epa.gov/pesticide-registration/list-n-disinfectants-use-against-sars-cov-2.    Cover your mouth and nose with a mask, tissue or washcloth to avoid spreading germs.    Wash your hands and face often. Use soap and water.    People in these groups are at risk for severe illness due to COVID-19:  o People 65 years and older  o People who live in a nursing home or long-term care facility  o People with chronic disease (lung, heart, cancer, diabetes, kidney, liver, immunologic)  o People who have a weakened immune system, including those who:  - Are in cancer treatment  - Take medicine that weakens the immune system, such as corticosteroids  - Had a bone marrow or organ transplant  - Have an immune deficiency  - Have poorly controlled HIV or AIDS  - Are obese (body mass index of 40 or higher)  - Smoke regularly      Caregivers should wear gloves while washing dishes, handling laundry and cleaning bedrooms and bathrooms.    Use caution when washing and drying laundry: Don't shake dirty laundry, and use the warmest water setting that you can.    For more tips, go to www.cdc.gov/coronavirus/2019-ncov/downloads/10Things.pdf.    Sign up for Her[facility]. We " know it's scary to hear that you might have COVID-19. We want to track your symptoms to make sure you're okay over the next 2 weeks. Please look for an email from [employer]--this is a free, online program that we'll use to keep in touch. To sign up, follow the link in the email you will receive. Learn more at [email or web address]    How can I take care of myself?    Get lots of rest. Drink extra fluids (unless a doctor has told you not to)    Take Tylenol (acetaminophen) for fever or pain. If you have liver or kidney problems, ask your family doctor if it's okay to take Tylenol.  Adults can take either:    650 mg (two 325 mg pills) every 4 to 6 hours, or     1,000 mg (two 500 mg pills) every 8 hours as needed.    Note: Don't take more than 3,000 mg in one day. Acetaminophen is found in many medicines (both prescribed and over-the-counter medicines). Read all labels to be sure you don't take too much.  For children, check the Tylenol bottle for the right dose. The dose is based on the child's age or weight.    If you have other health problems (like cancer, heart failure, an organ transplant or severe kidney disease): Call your specialty clinic if you don't feel better in the next 2 days.    Know when to call 911. Emergency warning signs include:  Trouble breathing or shortness of breath  Pain or pressure in the chest that doesn't go away  Feeling confused like you haven't felt before, or not being able to wake up  Bluish-colored lips or face  Where can I get more information?    [facility] - About COVID-19: [email or web address]    CDC - What to Do If You're Sick: www.cdc.gov/coronavirus/2019-ncov/about/steps-when-sick.html      November 17, 2020    RE:  Kaleigh Nam                                                                                                                                                       [id number] [facility] [address]        To  whom it may concern:    I evaluated Kaleigh Nam on 11/17/20. Kaleigh Nam should be excused from work/school.     Do not go to work.      If you receive a negative COVID-19 test result and were NOT exposed to someone with a known positive COVID-19 test, you can return to work once you're free of fever for 24 hours without fever-reducing medication and your symptoms are improving or resolved.    If you receive a positive COVID-19 test result, you must be cleared by Employee Occupational Health and Safety to return to work.     If you were exposed to someone who has tested positive for COVID-19, you can return to work 14 days after your last contact with the positive individual, provided you do not have symptoms at all during that time. In some cases, your manager may ask you to come back sooner than 14 days.       Sincerely,  Des Bee MD

## 2020-11-19 ENCOUNTER — E-VISIT (OUTPATIENT)
Dept: URGENT CARE | Facility: CLINIC | Age: 45
End: 2020-11-19
Payer: COMMERCIAL

## 2020-11-19 DIAGNOSIS — R09.81 SINUS CONGESTION: Primary | ICD-10-CM

## 2020-11-19 DIAGNOSIS — Z20.822 SUSPECTED COVID-19 VIRUS INFECTION: ICD-10-CM

## 2020-11-19 LAB
DEPRECATED S PYO AG THROAT QL EIA: NEGATIVE
SPECIMEN SOURCE: NORMAL
SPECIMEN SOURCE: NORMAL
STREP GROUP A PCR: NOT DETECTED

## 2020-11-19 PROCEDURE — 99421 OL DIG E/M SVC 5-10 MIN: CPT | Performed by: PHYSICIAN ASSISTANT

## 2020-11-19 PROCEDURE — U0003 INFECTIOUS AGENT DETECTION BY NUCLEIC ACID (DNA OR RNA); SEVERE ACUTE RESPIRATORY SYNDROME CORONAVIRUS 2 (SARS-COV-2) (CORONAVIRUS DISEASE [COVID-19]), AMPLIFIED PROBE TECHNIQUE, MAKING USE OF HIGH THROUGHPUT TECHNOLOGIES AS DESCRIBED BY CMS-2020-01-R: HCPCS | Performed by: EMERGENCY MEDICINE

## 2020-11-19 PROCEDURE — 87651 STREP A DNA AMP PROBE: CPT | Performed by: EMERGENCY MEDICINE

## 2020-11-19 RX ORDER — FLUTICASONE PROPIONATE 50 MCG
1 SPRAY, SUSPENSION (ML) NASAL DAILY
Qty: 16 G | Refills: 0 | Status: SHIPPED | OUTPATIENT
Start: 2020-11-19 | End: 2021-06-23

## 2020-11-20 LAB
SARS-COV-2 RNA SPEC QL NAA+PROBE: NOT DETECTED
SPECIMEN SOURCE: NORMAL

## 2020-12-21 ENCOUNTER — ANCILLARY PROCEDURE (OUTPATIENT)
Dept: MAMMOGRAPHY | Facility: CLINIC | Age: 45
End: 2020-12-21
Attending: INTERNAL MEDICINE
Payer: COMMERCIAL

## 2020-12-21 DIAGNOSIS — Z12.31 ENCOUNTER FOR SCREENING MAMMOGRAM FOR BREAST CANCER: ICD-10-CM

## 2020-12-21 PROCEDURE — 77063 BREAST TOMOSYNTHESIS BI: CPT | Mod: TC | Performed by: RADIOLOGY

## 2020-12-21 PROCEDURE — 77067 SCR MAMMO BI INCL CAD: CPT | Mod: TC | Performed by: RADIOLOGY

## 2021-06-21 ENCOUNTER — OFFICE VISIT (OUTPATIENT)
Dept: URGENT CARE | Facility: URGENT CARE | Age: 46
End: 2021-06-21
Payer: COMMERCIAL

## 2021-06-21 ENCOUNTER — TELEPHONE (OUTPATIENT)
Dept: ENDOCRINOLOGY | Facility: CLINIC | Age: 46
End: 2021-06-21

## 2021-06-21 VITALS
HEART RATE: 70 BPM | SYSTOLIC BLOOD PRESSURE: 104 MMHG | DIASTOLIC BLOOD PRESSURE: 70 MMHG | TEMPERATURE: 98.8 F | OXYGEN SATURATION: 100 % | BODY MASS INDEX: 44.72 KG/M2 | WEIGHT: 275 LBS

## 2021-06-21 DIAGNOSIS — H60.391 INFECTIVE OTITIS EXTERNA, RIGHT: Primary | ICD-10-CM

## 2021-06-21 PROCEDURE — 99213 OFFICE O/P EST LOW 20 MIN: CPT | Performed by: PHYSICIAN ASSISTANT

## 2021-06-21 RX ORDER — NEOMYCIN SULFATE, POLYMYXIN B SULFATE, HYDROCORTISONE 3.5; 10000; 1 MG/ML; [USP'U]/ML; MG/ML
3 SOLUTION/ DROPS AURICULAR (OTIC) 4 TIMES DAILY
Qty: 10 ML | Refills: 0 | Status: SHIPPED | OUTPATIENT
Start: 2021-06-21 | End: 2021-08-09

## 2021-06-21 NOTE — PROGRESS NOTES
SUBJECTIVE:  Kaleigh Nam is a 46 year old female who presents with right ear pain and fullness for 2 day(s).   Did swim recently but denies any other trauma.    Severity: mild   Timing:gradual onset  Additional symptoms include none.      History of recurrent otitis: no    Past Medical History:   Diagnosis Date     Allergic rhinitis, cause unspecified      Anxiety state, unspecified      Graves's Disease      Moderate episode of recurrent major depressive disorder (H) 12/6/2018     Multinodular Goiter      Severe pre-eclampsia, unspecified as to episode of care      Current Outpatient Medications   Medication Sig Dispense Refill     DULoxetine (CYMBALTA) 60 MG capsule TAKE ONE CAPSULE BY MOUTH ONCE DAILY 90 capsule 3     levothyroxine (SYNTHROID/LEVOTHROID) 137 MCG tablet Take 1 tablet (137 mcg) by mouth daily 90 tablet 3     Pseudoephedrine-APAP-DM (DAYQUIL OR)        ferrous sulfate (FEROSUL) 325 (65 Fe) MG tablet Take 1 tablet (325 mg) by mouth daily (with breakfast) (Patient not taking: Reported on 6/21/2021) 90 tablet 3     fluticasone (FLONASE) 50 MCG/ACT nasal spray Spray 1 spray into both nostrils daily (Patient not taking: Reported on 6/21/2021) 16 g 0     loratadine (CLARITIN) 10 MG tablet Take 10 mg by mouth daily as needed.       Social History     Tobacco Use     Smoking status: Never Smoker     Smokeless tobacco: Never Used   Substance Use Topics     Alcohol use: Yes     Alcohol/week: 3.0 standard drinks     Types: 3 Standard drinks or equivalent per week     Comment: 1 month       ROS:   Review of systems negative except as stated above.    OBJECTIVE:  /70   Pulse 70   Temp 98.8  F (37.1  C)   Wt 124.7 kg (275 lb)   SpO2 100%   BMI 44.72 kg/m     EXAM:  The right TM is normal: no effusions, no erythema, and normal landmarks     The right auditory canal is erythematous and swollen  The left TM is normal: no effusions, no erythema, and normal landmarks  The left auditory canal is  normal and without drainage, edema or erythema  Oropharynx exam is normal: no lesions, erythema, adenopathy or exudate.  GENERAL: no acute distress  EYES: EOMI,  PERRL, conjunctiva clear  NECK: supple, non-tender to palpation, no adenopathy noted  RESP: lungs clear to auscultation - no rales, rhonchi or wheezes  CV: regular rates and rhythm, normal S1 S2, no murmur noted  SKIN: no suspicious lesions or rashes     assessment/plan:  (H60.391) Infective otitis externa, right  (primary encounter diagnosis)  Comment:   Plan: neomycin-polymyxin-hydrocortisone (CORTISPORIN)        [id number] otic solution          Med as directed and OTC med if needed for pain  To Follow-up with PCP as needed if sx fail to improve

## 2021-06-21 NOTE — TELEPHONE ENCOUNTER
REFERRAL INFORMATION:    Referring Provider:  Self Referral     Referring Clinic:  N/A    Reason for Visit/Diagnosis: New MWM, BMI 44       FUTURE VISIT INFORMATION:    Appointment Date: 6/23/2021    Appointment Time: 5:45 PM      NOTES RECORD STATUS  DETAILS   OFFICE NOTE from Referring Provider N/A    OFFICE NOTE from Other Specialists Internal 10/2/2020 Office visit with Dr. Dusty Bang ([facility])    10/21/19 Office visit with Dr. Sandro Eller ( [facility])        HOSPITAL DISCHARGE SUMMARY/ ED VISITS  N/A    OPERATIVE REPORT N/A    ENDOSCOPY (EGD)  N/A    PERTINENT LABS Internal    PATHOLOGY REPORTS (RELATED) N/A    IMAGING (CT, MRI, US, XR)  N/A

## 2021-06-23 ENCOUNTER — PRE VISIT (OUTPATIENT)
Dept: ENDOCRINOLOGY | Facility: CLINIC | Age: 46
End: 2021-06-23

## 2021-06-23 ENCOUNTER — VIRTUAL VISIT (OUTPATIENT)
Dept: ENDOCRINOLOGY | Facility: CLINIC | Age: 46
End: 2021-06-23
Payer: COMMERCIAL

## 2021-06-23 VITALS — WEIGHT: 275 LBS | BODY MASS INDEX: 44.2 KG/M2 | HEIGHT: 66 IN

## 2021-06-23 DIAGNOSIS — E66.01 CLASS 3 SEVERE OBESITY DUE TO EXCESS CALORIES WITH SERIOUS COMORBIDITY AND BODY MASS INDEX (BMI) OF 40.0 TO 44.9 IN ADULT (H): Primary | ICD-10-CM

## 2021-06-23 DIAGNOSIS — E66.813 CLASS 3 SEVERE OBESITY DUE TO EXCESS CALORIES WITH SERIOUS COMORBIDITY AND BODY MASS INDEX (BMI) OF 40.0 TO 44.9 IN ADULT (H): Primary | ICD-10-CM

## 2021-06-23 PROCEDURE — 99205 OFFICE O/P NEW HI 60 MIN: CPT | Mod: 95 | Performed by: NURSE PRACTITIONER

## 2021-06-23 ASSESSMENT — PAIN SCALES - GENERAL: PAINLEVEL: NO PAIN (0)

## 2021-06-23 ASSESSMENT — MIFFLIN-ST. JEOR: SCORE: 1894.14

## 2021-06-23 NOTE — PROGRESS NOTES
Carin is a 46 year old who is being evaluated via a billable video visit.      How would you like to obtain your AVS? MyChart  If the video visit is dropped, the invitation should be resent by: Text to cell phone: [phone]  Will anyone else be joining your video visit? No      Video Start Time: 1743  Video-Visit Details    Type of service:  Video Visit    Video End Time:1828    Originating Location (pt. Location): Home    Distant Location (provider location):  [facility] [place]     Platform used for Video Visit: [employer]

## 2021-06-23 NOTE — PROGRESS NOTES
"New Medical Weight Management Consult    PATIENT:  Kaleigh Nam  MRN:         [id number]  :         1975  ELIAS:         2021        I had the pleasure of seeing your patient, Kaleigh Nam. Full intake/assessment was done to determine barriers to weight loss success and develop a treatment plan. Kaleigh Nam is a 46 year old female interested in treatment of medical problems associated with excess weight. She has a height of 5' 6\", a weight of 275 lbs 0 oz, and the calculated Body mass index is 44.39 kg/m .      Assessment & Plan   Problem List Items Addressed This Visit        Digestive    Class 3 severe obesity due to excess calories with serious comorbidity and body mass index (BMI) of 40.0 to 44.9 in adult (H) - Primary     Struggled with weight all of life. Always aware of and had feelings about weight. Dieting much of life. 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 Weight gain with pregnancies also. Weight loss with structured diet has always been difficult. Did have 60lb weight loss when developed hyperthyroidism - able to keep this weight off for a couple of years. More significant and steady weight gain in the last few years.     Weight gain now associated with knee and foot pain. This is limiting activity and causing daily pain, especially with work. Looking to improve health and prevent potential co morbidities by losing weight. Many family members also struggle with weight and want to do this together.     Some comfort/ reward pathway with food described. Discussed contrave. Mood currently stable on cymbalta, higher dose. Will avoid contrave for now.     Also struggles with hunger and stress eating.   Patient interested in starting Wegovy. No hx of pancreatitis or medullary thyroid cancer. No current reflux. Discussed taper up and typical side effects. Will refer to MTM for close follow up with titration.     Discussed 24 week healthy lifestyle plan. Already " scheduled with RD. Patient will consider. Lots of stress with kids right now. May not be a good time logistically. Will consider. Wife is also considering mwm with our program.          Relevant Medications    Semaglutide,0.25 or 0.5MG/DOS, 2 MG/1.5ML SOPN    Other Relevant Orders    Basic metabolic panel    Hemoglobin A1c    MED THERAPY MANAGE REFERRAL           62 minutes spent on the date of the encounter doing chart review, history and exam, documentation and further activities per the note    MEDICATIONS:        - Start taking Wegovy (semeglutide 2.4mg) taper up        - Continue other medications without change  CONSULTATION/REFERRAL to [facility] pharmacist in 3-4 weeks   FUTURE LABS:       - Schedule a non-fasting blood draw   FUTURE APPOINTMENTS:       - Follow-up visit in 2 months with me   Goals  - read about and consider 24 week healthy lifestyle plan- does it fit now?   - food journal x 2-3 days prior to seeing dietitian- when and what you are eating- hungry? Emotion?   -weekly weights     Kaleigh Nam is a 46 year old female who presents to clinic today for the following health issues       She has the following co-morbidities:       6/21/2021   I have the following health issues associated with obesity: Pre-Diabetes, GERD (Reflux), Stress Incontinence, Osteoarthritis (joint disease), Hypothyroidism   I have the following symptoms associated with obesity: Knee Pain, Depression, Lower Extremity Swelling, Back Pain, Fatigue, Groin Rash, Hip Pain     Knee and foot pain daily. 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     Patient Goals 6/21/2021   I am interested in having a healthier weight to diminish current health problems: Yes   I am interested in having a healthier weight in order to prevent future health problems: Yes   I am interested in having a healthier weight in order to have a future surgery: No       Referring Provider 6/21/2021   Please  "name the provider who referred you to Medical Weight Management.  If you do not know, please answer: \"I Don't Know\". Dr. Cecilia Almanza       Weight History 6/21/2021   How concerned are you about your weight? Very Concerned   Would you describe your weight gain as gradual? Yes   I became overweight: As a Teenager   The following factors have contributed to my weight gain:  Eating Wrong Types of Food, Eating Too Much, Lack of Exercise, Genetic (Runs in the Family), Stress   I have tried the following methods to lose weight: Watching Portions or Calories, Weight Watchers   My lowest weight since age 16 was: 180   My highest weight since age 16 was: 275   The most weight I have ever lost was: (lbs) 60   I have the following family history of obesity/being overweight:  My mother is overweight, One or more of my siblings are overweight, Many of my relatives are overweight   Has anyone in your family had weight loss surgery? Yes   How has your weight changed over the last year?  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 Maintained that weight loss for about a year until pregnancy. Gained a lot of weight with 3rd pregnancy. Lost 60lb after that and kept off for 2 years.     200lb 3 years ago, steady increase since then.     Has always struggled with weight. Has lost weight on and off over the years. Trying many different things with out much benefit.     Has always felt very healthy but recently had increased issues with knees. Cortisone shot 1 year ago.     Starting to have perimenopause symptoms but still has reg periods.     Diet Recall Review with Patient 6/21/2021   Do you typically eat breakfast? Yes   If you do eat breakfast, what types of food do you eat? English muffin, egg, slice of cheese   Do you typically eat lunch? Yes   If you do eat lunch, what types of food do " you typically eat?  Salads, leftovers from the night befpre   Do you typically eat supper? Yes   If you do eat supper, what types of food do you typically eat? Grilled pork tenderloin, chicken thighs, veggies, bread or rice   Do you typically eat snacks? Yes   If you do snack, what types of food do you typically eat? Cheese and apple, coffee and a cookie, Greek yogurt   Do you like vegetables?  Yes   Do you drink water? Yes   How many glasses of juice do you drink in a typical day? 10   How many of glasses of milk do you drink in a typical day? 0   If you do drink milk, what type? Skim   How many 8oz glasses of sugar containing drinks such as [employer]/sweet tea do you drink in a day? 0   How many cans/bottles of sugar pop/soda/tea/sports drinks do you drink in a day? 0   How many cans/bottles of diet pop/soda/tea or sports drink do you drink in a day? 0   How often do you have a drink of alcohol? Monthly or Less   If you do drink, how many drinks might you have in a day? 1 or 2       Eating Habits 6/21/2021   Generally, my meals include foods like these: bread, pasta, rice, potatoes, corn, crackers, sweet dessert, pop, or juice. A Few Times a Week   Generally, my meals include foods like these: fried meats, brats, burgers, french fries, pizza, cheese, chips, or ice cream. A Few Times a Week   Eat fast food (like [employer], [employer], [employer]). Once a Week   Eat at a buffet or sit-down restaurant. Never   Eat most of my meals in front of the TV or computer. Almost Everyday   Often skip meals, eat at random times, have no regular eating times. Less Than Weekly   Rarely sit down for a meal but snack or graze throughout.  Less Than Weekly   Eat extra snacks between meals. A Few Times a Week   Eat most of my food at the end of the day. Never   Eat in the middle of the night or wake up at night to eat. Never   Eat extra snacks to prevent or correct low blood sugar. Less Than Weekly   Eat to prevent acid reflux or  stomach pain. A Few Times a Week   Worry about not having enough food to eat. Never   Have you been to the food shelf at least a few times this year? No   I eat when I am depressed. Less Than Weekly   I eat when I am stressed. A Few Times a Week   I eat when I am bored. Less Than Weekly   I eat when I am anxious. Less Than Weekly   I eat when I am happy or as a reward. Once a Week   I feel hungry all the time even if I just have eaten. Less Than Weekly   Feeling full is important to me. Once a Week   I finish all the food on my plate even if I am already full. A Few Times a Week   I can't resist eating delicious food or walk past the good food/smell. A Few Times a Week   I eat/snack without noticing that I am eating. A Few Times a Week   I eat when I am preparing the meal. A Few Times a Week   I eat more than usual when I see others eating. Less Than Weekly   I have trouble not eating sweets, ice cream, cookies, or chips if they are around the house. Once a Week   I think about food all day. Less Than Weekly   What foods, if any, do you crave? Sweets/Candy/Chocolate     Normal portions  When busy/ stressed - chooses     Amount of Food 6/21/2021   I make myself vomit what I have eaten or use laxatives to get rid of food. Never   I eat a large amount of food, like a loaf of bread, a box of cookies, a pint/quart of ice cream, all at once. Never   I eat a large amount of food even when I am not hungry. Never   I eat rapidly. Everyday   I eat alone because I feel embarrassed and do not want others to see how much I have eaten. Never   I eat until I am uncomfortably full. Weekly   I feel bad, disgusted, or guilty after I overeat. Never   I make myself vomit what I have eaten or use laxatives to get rid of food. Never       Activity/Exercise History 6/21/2021   How much of a typical 12 hour day do you spend sitting? Half the Day   How much of a typical 12 hour day do you spend lying down? Less Than Half the Day   How much  of a typical day do you spend walking/standing? Half the Day   How many hours (not including work) do you spend on the TV/Video Games/Computer/Tablet/Phone? 4-5 Hours   How many times a week are you active for the purpose of exercise? Never   What keeps you from being more active? Pain   How many total minutes do you spend doing some activity for the purpose of exercising when you exercise? None       PAST MEDICAL HISTORY:  Past Medical History:   Diagnosis Date     Allergic rhinitis, cause unspecified      Anxiety state, unspecified      Graves's Disease      Moderate episode of recurrent major depressive disorder (H) 12/6/2018     Multinodular Goiter      Severe pre-eclampsia, unspecified as to episode of care        Work/Social History Reviewed With Patient 6/21/2021   My employment status is: Part-Time   My job is: RN   How much of your job is spent on the computer or phone? Less Than 50%   How many hours do you spend commuting to work daily?  1 hour   What is your marital status? /In a Relationship   If in a relationship, is your significant other overweight? Yes   Do you have children? Yes   If you have children, are they overweight? No   Who do you live with?  My wife, her four children and two of my children   Are they supportive of your health goals? Yes   Who does the food shopping?  Me and my bonus daughter     Wife and several children also struggle with weight. Looking to do this change as a family     Mental Health History Reviewed With Patient 6/21/2021   Have you ever been physically or sexually abused? No   If yes, do you feel that the abuse is affecting your weight? N/A   If yes, would you like to talk to a counselor about the abuse? N/A   How often in the past 2 weeks have you felt little interest or pleasure in doing things? Not at all   Over the past 2 weeks how often have you felt down, depressed, or hopeless? For Several Days     Lots of stress- get outside, activity, music, [email or web address]  tone       Sleep History Reviewed With Patient 6/21/2021   How many hours do you sleep at night? 7   Do you think that you snore loudly or has anybody ever heard you snore loudly (louder than talking or so loud it can be heard behind a shut door)? Yes   Has anyone seen or heard you stop breathing during your sleep? No   Do you often feel tired, fatigued, or sleepy during the day? Yes   Do you have a TV/Computer in your bedroom? Yes       MEDICATIONS:   Current Outpatient Medications   Medication Sig Dispense Refill     DULoxetine (CYMBALTA) 60 MG capsule TAKE ONE CAPSULE BY MOUTH ONCE DAILY 90 capsule 3     levothyroxine (SYNTHROID/LEVOTHROID) 137 MCG tablet Take 1 tablet (137 mcg) by mouth daily 90 tablet 3     neomycin-polymyxin-hydrocortisone (CORTISPORIN) 3.5-39390-7 otic solution Place 3 drops into the right ear 4 times daily 10 mL 0     Semaglutide,0.25 or 0.5MG/DOS, 2 MG/1.5ML SOPN Inject 0.25 mg subcutaneously once weekly for 4 weeks then 0.5 mg once weekly. 1.5 mL 1     ferrous sulfate (FEROSUL) 325 (65 Fe) MG tablet Take 1 tablet (325 mg) by mouth daily (with breakfast) (Patient not taking: Reported on 6/23/2021) 90 tablet 3     fluticasone (FLONASE) 50 MCG/ACT nasal spray Spray 1 spray into both nostrils daily (Patient not taking: Reported on 6/21/2021) 16 g 0     loratadine (CLARITIN) 10 MG tablet Take 10 mg by mouth daily as needed.       Pseudoephedrine-APAP-DM (DAYQUIL OR)          ALLERGIES:   Allergies   Allergen Reactions     No Known Drug Allergies        Orders Only on 11/19/2020   Component Date Value Ref Range Status     COVID-19 Virus PCR to [facility] - So* 11/19/2020 Nasopharyngeal   Final     COVID-19 Virus PCR to [facility] - Re* 11/19/2020 Not Detected   Final    Comment: Collection of multiple specimens from the same patient may be necessary to   detect the virus. The possibility of a false negative should be considered if   the patient's recent exposure or clinical presentation suggests 2019  nCOV   infection and diagnostic tests for other causes of illness are negative.   Repeat testing may be considered in this setting.  Patient sample was heat inactivated and amplified using the HDPCR SARS-CoV-2   assay ([employer] The HDPCRTM SARS-CoV-2 assay is a reverse   transcription real-time polymerase chain reaction (qRT-PCR) test intended for   the qualitative detection of nucleic acid  from SARS-CoV-2 in human nasopharyngeal swabs, oropharyngeal swabs, anterior   nasal swabs, mid-turbinate nasal swabs as well as nasal aspirate, nasal wash,   and bronchoalveolar lavage (BAL) specimens from individuals who are suspected   of COVID-19 by their healthcare provider.  A negative result does not rule out the presence of real-time PCR inhibitors   in the sp                           ecimen or COVID-19 RNA in concentrations below the limit of detection   of the assay. The possibility of a false negative should be considered if the   patients recent exposure or clinical presentation suggests COVID-19.   Additional testing or repeat testing requires consultation with the   laboratory.  Nasopharyngeal specimen is the preferred choice for swab-based SARS CoV2   testing. When collection of a nasopharyngeal swab is not possible the   following are acceptable alternatives:  an oropharyngeal (OP) specimen collected by a healthcare professional, or a   nasal mid-turbinate (NMT) swab collected by a healthcare professional or by   onsite self-collection (using a flocked tapered swab), or an anterior nares   specimen collected by a healthcare professional or by onsite self-collection   (using a round foam swab). (Centers for Disease Control)  Testing performed by [facility] ([facility]) [address] [place] [address] [address] [place] [address]  The test performance characteristics were determined by [facility]. It has not been   cleared or approved by  "the FDA.  The laboratory is regulated under the Clinical Laboratory Improvement   Amendments of 1988 (CLIA-88) as qualified to perform high-complexity testing.   This test is used for clinical purposes. It should not be regarded as   investigational or for research.       Strep Specimen Description 11/19/2020 Throat   Final     Streptococcus Group A Rapid Screen 11/19/2020 Negative  NEG^Negative Final    Comment: No Group A streptococcal antigen detected by immunoassay. Confirmatory testing   in progress.       Specimen Description 11/19/2020 Throat   Final     Strep Group A PCR 11/19/2020 Not Detected  NDET^Not Detected Final    Comment: Group A Streptococcus DNA is not detected.  FDA approved assay performed using [employer] GeneXpert real-time PCR.           PHYSICAL EXAM:  Objective    Ht 1.676 m (5' 6\")   Wt 124.7 kg (275 lb)   BMI 44.39 kg/m    Vitals - Patient Reported  Pain Score: No Pain (0)      Vitals:  No vitals were obtained today due to virtual visit.    Physical Exam   GENERAL: Healthy, alert and no distress  EYES: Eyes grossly normal to inspection.  No discharge or erythema, or obvious scleral/conjunctival abnormalities.  RESP: No audible wheeze, cough, or visible cyanosis.  No visible retractions or increased work of breathing.    SKIN: Visible skin clear. No significant rash, abnormal pigmentation or lesions.  NEURO: Cranial nerves grossly intact.  Mentation and speech appropriate for age.  PSYCH: Mentation appears normal, affect normal/bright, judgement and insight intact, normal speech and appearance well-groomed.        Sincerely,    Lisa Andre NP      "

## 2021-06-23 NOTE — LETTER
"2021       RE: Kaleigh Nam  [address] [facility] [place] [address]     Dear Colleague,    Thank you for referring your patient, Kaleigh Nam, to the [facility]. Please see a copy of my visit note below.    New Medical Weight Management Consult    PATIENT:  Kaleigh Nam  MRN:         [id number]  :         1975  ELIAS:         2021        I had the pleasure of seeing your patient, Kaleigh Nam. Full intake/assessment was done to determine barriers to weight loss success and develop a treatment plan. Kaleigh Nam is a 46 year old female interested in treatment of medical problems associated with excess weight. She has a height of 5' 6\", a weight of 275 lbs 0 oz, and the calculated Body mass index is 44.39 kg/m .      Assessment & Plan   Problem List Items Addressed This Visit        Digestive    Class 3 severe obesity due to excess calories with serious comorbidity and body mass index (BMI) of 40.0 to 44.9 in adult (H) - Primary     Struggled with weight all of life. Always aware of and had feelings about weight. Dieting much of life. 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 Weight gain with pregnancies also. Weight loss with structured diet has always been difficult. Did have 60lb weight loss when developed hyperthyroidism - able to keep this weight off for a couple of years. More significant and steady weight gain in the last few years.     Weight gain now associated with knee and foot pain. This is limiting activity and causing daily pain, especially with work. Looking to improve health and prevent potential co morbidities by losing weight. Many family members also struggle with weight and want to do this together.     Some comfort/ reward pathway with food described. Discussed contrave. Mood currently stable on cymbalta, higher dose. Will " avoid contrave for now.     Also struggles with hunger and stress eating.   Patient interested in starting Wegovy. No hx of pancreatitis or medullary thyroid cancer. No current reflux. Discussed taper up and typical side effects. Will refer to [facility] for close follow up with titration.     Discussed 24 week healthy lifestyle plan. Already scheduled with RD. Patient will consider. Lots of stress with kids right now. May not be a good time logistically. Will consider. Wife is also considering mwm with our program.          Relevant Medications    Semaglutide,0.25 or 0.5MG/DOS, 2 MG/1.5ML SOPN    Other Relevant Orders    Basic metabolic panel    Hemoglobin A1c    MED THERAPY MANAGE REFERRAL           62 minutes spent on the date of the encounter doing chart review, history and exam, documentation and further activities per the note    MEDICATIONS:        - Start taking Wegovy (semeglutide 2.4mg) taper up        - Continue other medications without change  CONSULTATION/REFERRAL to [facility] pharmacist in 3-4 weeks   FUTURE LABS:       - Schedule a non-fasting blood draw   FUTURE APPOINTMENTS:       - Follow-up visit in 2 months with me   Goals  - read about and consider 24 week healthy lifestyle plan- does it fit now?   - food journal x 2-3 days prior to seeing dietitian- when and what you are eating- hungry? Emotion?   -weekly weights     Kaleigh Nam is a 46 year old female who presents to clinic today for the following health issues       She has the following co-morbidities:       6/21/2021   I have the following health issues associated with obesity: Pre-Diabetes, GERD (Reflux), Stress Incontinence, Osteoarthritis (joint disease), Hypothyroidism   I have the following symptoms associated with obesity: Knee Pain, Depression, Lower Extremity Swelling, Back Pain, Fatigue, Groin Rash, Hip Pain     Knee and foot pain daily. 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  "    Patient Goals 6/21/2021   I am interested in having a healthier weight to diminish current health problems: Yes   I am interested in having a healthier weight in order to prevent future health problems: Yes   I am interested in having a healthier weight in order to have a future surgery: No       Referring Provider 6/21/2021   Please name the provider who referred you to Medical Weight Management.  If you do not know, please answer: \"I Don't Know\". Dr. Cecilia Almanza       Weight History 6/21/2021   How concerned are you about your weight? Very Concerned   Would you describe your weight gain as gradual? Yes   I became overweight: As a Teenager   The following factors have contributed to my weight gain:  Eating Wrong Types of Food, Eating Too Much, Lack of Exercise, Genetic (Runs in the Family), Stress   I have tried the following methods to lose weight: Watching Portions or Calories, Weight Watchers   My lowest weight since age 16 was: 180   My highest weight since age 16 was: 275   The most weight I have ever lost was: (lbs) 60   I have the following family history of obesity/being overweight:  My mother is overweight, One or more of my siblings are overweight, Many of my relatives are overweight   Has anyone in your family had weight loss surgery? Yes   How has your weight changed over the last year?  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 Maintained that weight loss for about a year until pregnancy. Gained a lot of weight with 3rd pregnancy. Lost 60lb after that and kept off for 2 years.     200lb 3 years ago, steady increase since then.     Has always struggled with weight. Has lost weight on and off over the years. Trying many different things with out much benefit.     Has always felt very healthy but recently had increased issues with knees. Cortisone shot 1 " year ago.     Starting to have perimenopause symptoms but still has reg periods.     Diet Recall Review with Patient 6/21/2021   Do you typically eat breakfast? Yes   If you do eat breakfast, what types of food do you eat? English muffin, egg, slice of cheese   Do you typically eat lunch? Yes   If you do eat lunch, what types of food do you typically eat?  Salads, leftovers from the night befpre   Do you typically eat supper? Yes   If you do eat supper, what types of food do you typically eat? Grilled pork tenderloin, chicken thighs, veggies, bread or rice   Do you typically eat snacks? Yes   If you do snack, what types of food do you typically eat? Cheese and apple, coffee and a cookie, Greek yogurt   Do you like vegetables?  Yes   Do you drink water? Yes   How many glasses of juice do you drink in a typical day? 10   How many of glasses of milk do you drink in a typical day? 0   If you do drink milk, what type? Skim   How many 8oz glasses of sugar containing drinks such as [employer]-Aid/sweet tea do you drink in a day? 0   How many cans/bottles of sugar pop/soda/tea/sports drinks do you drink in a day? 0   How many cans/bottles of diet pop/soda/tea or sports drink do you drink in a day? 0   How often do you have a drink of alcohol? Monthly or Less   If you do drink, how many drinks might you have in a day? 1 or 2       Eating Habits 6/21/2021   Generally, my meals include foods like these: bread, pasta, rice, potatoes, corn, crackers, sweet dessert, pop, or juice. A Few Times a Week   Generally, my meals include foods like these: fried meats, brats, burgers, french fries, pizza, cheese, chips, or ice cream. A Few Times a Week   Eat fast food (like [employer], [employer], Taco Bell). Once a Week   Eat at a buffet or sit-down restaurant. Never   Eat most of my meals in front of the TV or computer. Almost Everyday   Often skip meals, eat at random times, have no regular eating times. Less Than Weekly   Rarely sit down for  a meal but snack or graze throughout.  Less Than Weekly   Eat extra snacks between meals. A Few Times a Week   Eat most of my food at the end of the day. Never   Eat in the middle of the night or wake up at night to eat. Never   Eat extra snacks to prevent or correct low blood sugar. Less Than Weekly   Eat to prevent acid reflux or stomach pain. A Few Times a Week   Worry about not having enough food to eat. Never   Have you been to the food shelf at least a few times this year? No   I eat when I am depressed. Less Than Weekly   I eat when I am stressed. A Few Times a Week   I eat when I am bored. Less Than Weekly   I eat when I am anxious. Less Than Weekly   I eat when I am happy or as a reward. Once a Week   I feel hungry all the time even if I just have eaten. Less Than Weekly   Feeling full is important to me. Once a Week   I finish all the food on my plate even if I am already full. A Few Times a Week   I can't resist eating delicious food or walk past the good food/smell. A Few Times a Week   I eat/snack without noticing that I am eating. A Few Times a Week   I eat when I am preparing the meal. A Few Times a Week   I eat more than usual when I see others eating. Less Than Weekly   I have trouble not eating sweets, ice cream, cookies, or chips if they are around the house. Once a Week   I think about food all day. Less Than Weekly   What foods, if any, do you crave? Sweets/Candy/Chocolate     Normal portions  When busy/ stressed - chooses     Amount of Food 6/21/2021   I make myself vomit what I have eaten or use laxatives to get rid of food. Never   I eat a large amount of food, like a loaf of bread, a box of cookies, a pint/quart of ice cream, all at once. Never   I eat a large amount of food even when I am not hungry. Never   I eat rapidly. Everyday   I eat alone because I feel embarrassed and do not want others to see how much I have eaten. Never   I eat until I am uncomfortably full. Weekly   I feel bad,  disgusted, or guilty after I overeat. Never   I make myself vomit what I have eaten or use laxatives to get rid of food. Never       Activity/Exercise History 6/21/2021   How much of a typical 12 hour day do you spend sitting? Half the Day   How much of a typical 12 hour day do you spend lying down? Less Than Half the Day   How much of a typical day do you spend walking/standing? Half the Day   How many hours (not including work) do you spend on the TV/Video Games/Computer/Tablet/Phone? 4-5 Hours   How many times a week are you active for the purpose of exercise? Never   What keeps you from being more active? Pain   How many total minutes do you spend doing some activity for the purpose of exercising when you exercise? None       PAST MEDICAL HISTORY:  Past Medical History:   Diagnosis Date     Allergic rhinitis, cause unspecified      Anxiety state, unspecified      Graves's Disease      Moderate episode of recurrent major depressive disorder (H) 12/6/2018     Multinodular Goiter      Severe pre-eclampsia, unspecified as to episode of care        Work/Social History Reviewed With Patient 6/21/2021   My employment status is: Part-Time   My job is: RN   How much of your job is spent on the computer or phone? Less Than 50%   How many hours do you spend commuting to work daily?  1 hour   What is your marital status? /In a Relationship   If in a relationship, is your significant other overweight? Yes   Do you have children? Yes   If you have children, are they overweight? No   Who do you live with?  My wife, her four children and two of my children   Are they supportive of your health goals? Yes   Who does the food shopping?  Me and my bonus daughter     Wife and several children also struggle with weight. Looking to do this change as a family     Mental Health History Reviewed With Patient 6/21/2021   Have you ever been physically or sexually abused? No   If yes, do you feel that the abuse is affecting your  weight? N/A   If yes, would you like to talk to a counselor about the abuse? N/A   How often in the past 2 weeks have you felt little interest or pleasure in doing things? Not at all   Over the past 2 weeks how often have you felt down, depressed, or hopeless? For Several Days     Lots of stress- get outside, activity, music, [email or web address] tone       Sleep History Reviewed With Patient 6/21/2021   How many hours do you sleep at night? 7   Do you think that you snore loudly or has anybody ever heard you snore loudly (louder than talking or so loud it can be heard behind a shut door)? Yes   Has anyone seen or heard you stop breathing during your sleep? No   Do you often feel tired, fatigued, or sleepy during the day? Yes   Do you have a TV/Computer in your bedroom? Yes       MEDICATIONS:   Current Outpatient Medications   Medication Sig Dispense Refill     DULoxetine (CYMBALTA) 60 MG capsule TAKE ONE CAPSULE BY MOUTH ONCE DAILY 90 capsule 3     levothyroxine (SYNTHROID/LEVOTHROID) 137 MCG tablet Take 1 tablet (137 mcg) by mouth daily 90 tablet 3     neomycin-polymyxin-hydrocortisone (CORTISPORIN) 3.5-95911-5 otic solution Place 3 drops into the right ear 4 times daily 10 mL 0     Semaglutide,0.25 or 0.5MG/DOS, 2 MG/1.5ML SOPN Inject 0.25 mg subcutaneously once weekly for 4 weeks then 0.5 mg once weekly. 1.5 mL 1     ferrous sulfate (FEROSUL) 325 (65 Fe) MG tablet Take 1 tablet (325 mg) by mouth daily (with breakfast) (Patient not taking: Reported on 6/23/2021) 90 tablet 3     fluticasone (FLONASE) 50 MCG/ACT nasal spray Spray 1 spray into both nostrils daily (Patient not taking: Reported on 6/21/2021) 16 g 0     loratadine (CLARITIN) 10 MG tablet Take 10 mg by mouth daily as needed.       Pseudoephedrine-APAP-DM (DAYQUIL OR)          ALLERGIES:   Allergies   Allergen Reactions     No Known Drug Allergies        Orders Only on 11/19/2020   Component Date Value Ref Range Status     COVID-19 Virus PCR to [facility] - [facility]*  11/19/2020 Nasopharyngeal   Final     COVID-19 Virus PCR to [facility] - Re* 11/19/2020 Not Detected   Final    Comment: Collection of multiple specimens from the same patient may be necessary to   detect the virus. The possibility of a false negative should be considered if   the patient's recent exposure or clinical presentation suggests 2019 nCOV   infection and diagnostic tests for other causes of illness are negative.   Repeat testing may be considered in this setting.  Patient sample was heat inactivated and amplified using the HDPCR SARS-CoV-2   assay ([employer] The HDPCRTM SARS-CoV-2 assay is a reverse   transcription real-time polymerase chain reaction (qRT-PCR) test intended for   the qualitative detection of nucleic acid  from SARS-CoV-2 in human nasopharyngeal swabs, oropharyngeal swabs, anterior   nasal swabs, mid-turbinate nasal swabs as well as nasal aspirate, nasal wash,   and bronchoalveolar lavage (BAL) specimens from individuals who are suspected   of COVID-19 by their healthcare provider.  A negative result does not rule out the presence of real-time PCR inhibitors   in the sp                           ecimen or COVID-19 RNA in concentrations below the limit of detection   of the assay. The possibility of a false negative should be considered if the   patients recent exposure or clinical presentation suggests COVID-19.   Additional testing or repeat testing requires consultation with the   laboratory.  Nasopharyngeal specimen is the preferred choice for swab-based SARS CoV2   testing. When collection of a nasopharyngeal swab is not possible the   following are acceptable alternatives:  an oropharyngeal (OP) specimen collected by a healthcare professional, or a   nasal mid-turbinate (NMT) swab collected by a healthcare professional or by   onsite self-collection (using a flocked tapered swab), or an anterior nares   specimen collected by a healthcare professional or by onsite self-collection  "  (using a round foam swab). (Centers for Disease Control)  Testing performed by [facility] ([facility]) [address] [place] [address]  The test performance characteristics were determined by . It has not been   cleared or approved by the FDA.  The laboratory is regulated under the Clinical Laboratory Improvement   Amendments of 1988 (CLIA-88) as qualified to perform high-complexity testing.   This test is used for clinical purposes. It should not be regarded as   investigational or for research.       Strep Specimen Description 11/19/2020 Throat   Final     Streptococcus Group A Rapid Screen 11/19/2020 Negative  NEG^Negative Final    Comment: No Group A streptococcal antigen detected by immunoassay. Confirmatory testing   in progress.       Specimen Description 11/19/2020 Throat   Final     Strep Group A PCR 11/19/2020 Not Detected  NDET^Not Detected Final    Comment: Group A Streptococcus DNA is not detected.  FDA approved assay performed using [employer] Gene[employer]pert real-time PCR.           PHYSICAL EXAM:  Objective    Ht 1.676 m (5' 6\")   Wt 124.7 kg (275 lb)   BMI 44.39 kg/m    Vitals - Patient Reported  Pain Score: No Pain (0)      Vitals:  No vitals were obtained today due to virtual visit.    Physical Exam   GENERAL: Healthy, alert and no distress  EYES: Eyes grossly normal to inspection.  No discharge or erythema, or obvious scleral/conjunctival abnormalities.  RESP: No audible wheeze, cough, or visible cyanosis.  No visible retractions or increased work of breathing.    SKIN: Visible skin clear. No significant rash, abnormal pigmentation or lesions.  NEURO: Cranial nerves grossly intact.  Mentation and speech appropriate for age.  PSYCH: Mentation appears normal, affect normal/bright, judgement and insight intact, normal speech and appearance well-groomed.        Sincerely,    UZIEL Ruiz " is a 46 year old who is being evaluated via a billable video visit.      How would you like to obtain your AVS? REPUCOMharOpsona  If the video visit is dropped, the invitation should be resent by: Text to cell phone: [phone]  Will anyone else be joining your video visit? No      Video Start Time: 1743  Video-Visit Details    Type of service:  Video Visit    Video End Time:1828    Originating Location (pt. Location): Home    Distant Location (provider location):  [facility] [place]     Platform used for Video Visit: [facility]

## 2021-06-23 NOTE — NURSING NOTE
"Chief Complaint   Patient presents with     Consult     Consultation Weight Management       Vitals:    06/23/21 1721   Weight: 124.7 kg (275 lb)   Height: 1.676 m (5' 6\")       Body mass index is 44.39 kg/m .                            "

## 2021-06-23 NOTE — PATIENT INSTRUCTIONS
"Thank you for allowing us the privilege of caring for you. We hope we provided you with the excellent service you deserve.   Please let us know if there is anything else we can do for you so that we can be sure you are completely satisfied with your care experience.    To ensure the quality of our services you may be receiving a patient satisfaction survey from an independent patient satisfaction monitoring company.    The greatest compliment you can give is a \"Likely to Recommend\"    Your visit was with Lisa Andre NP today.    Instructions per today's visit:     Dimitry Nam, it was great to visit with you today.  Here is a review of our visit.  If our clinic scheduler is not able to reach you please call [phone] to schedule your next appointments.        MEDICATIONS:        - Start taking Wegovy (semeglutide 2.4mg) taper up        - Continue other medications without change  CONSULTATION/REFERRAL to [facility] pharmacist in 3-4 weeks   FUTURE LABS:       - Schedule a non-fasting blood draw   FUTURE APPOINTMENTS:       - Follow-up visit in 2 months with me   Goals  - read about and consider 24 week healthy lifestyle plan- does it fit now? - see information below   - food journal x 2-3 days prior to seeing dietitian- when and what you are eating- hungry? Emotion?   -weekly weights       Information about Video Visits with [facility]: video visit information  _________________________________________________________________________________________________________________________________________________________  Important contact and scheduling information:  Please call our contact center at [phone] to schedule your next appointments.  To find a lab location near you, please call [phone].  For any nursing questions or concerns call Denita Gutiérrez LPN at [phone] or Jadyn Rodgers RN at [phone]  Please call during clinic hours Monday through Friday 8:00a - 4:00p if you have questions or " you can contact us via [employer] at anytime and we will reply during clinic hours.    Lab results will be communicated through My Chart or letter (if My Chart not used). Please call the clinic if you have not received communication after 1 week or if you have any questions.?  Clinic Fax: [phone]  _________________________________________________________________________________________________________________________________________________________  Meal Replacement Products:    Here is the link to our new e-store where you can purchase our meal replacement products    [employer] E-Store  [employer]/store    The one week starter kit is a great way to sample a variety of products and see what works for you.    If you want more information about the product go to: [employer] Meals  [employer]    If you are an employee or [facility] [employer] or [employer] please contact your care team for a 10% estore discount    Free Shipping for orders over $75     Benefits of meal replacements products:    Portion and calorie control  Improved nutrition  Structured eating  Simplified food choices  Avoid contact with trigger foods  _________________________________________________________________________________________________________________________________________________________  Interested in working with a health ?  Health coaches work with you to improve your overall health and wellbeing.  They look at the whole person, and may involve discussion of different areas of life, including, but not limited to the four pillars of health (sleep, exercise, nutrition, and stress management). Discuss with your care team if you would like to start working a health .  Health Coaching-3 Pack: Schedule by calling [phone]    $99 for three health coaching visits    Visits may be done in person or via phone    Coaching is a partnership between the  and the client; Coaches do not  prescribe or diagnose    Coaching helps inspire the client to reach his/her personal goals   _________________________________________________________________________________________________________________________________________________________  24 Week Healthy Lifestyle Plan:    Our mission in the 24-week Healthy Lifestyle Plan is to provide you with individualized care by giving you the tools, education and support you need to lose weight and maintain a healthy lifestyle. In your 24-week journey, you ll be supported by a dedicated weight loss team that includes registered dietitians, medical weight management providers, health coaches, and nurses -- all with special expertise in weight loss -- to help you every step of the way.     Monthly meetings with your registered dietician or medical weight management provider help to review your progress, update your care plan, and make any adjustments needed to ensure success. Between these visits, weekly and bi-weekly health  visits will help you focus on the four pillars of weight loss -- stress, sleep, nutrition, and exercise -- and how you can best adapt each to achieve sustainable weight loss results.    In addition, you will be given exclusive access to online wellbeing classes through .[employer].  Your initial visit will be with a medical weight management provider who will help to understand your weight loss goals and ensure this program is the right fit for you. Please let our team know if you are interested in the 24 week plan by sending a message to your care team or calling [phone] to schedule.  _________________________________________________________________________________________________________________________________________________________    Virtual Support Groups:    We offer support groups for patients who are working on weight loss and considering, preparing for or have had weight loss surgery.     You are invited to attend the?Virtual  Support Groups?provided by any of the following locations:    1. [facility] via Visualant Teams with Debi Bonilla RN  2.   [place] via Visualant Teams with Jesus Hahn, PhD, LP  3.   [place] via Visual[employer] Teams with Kimmie Monk RN  4.   [facility] via Visualant Teams with Kimmie Suero [facility] Healthy Lifestyle Virtual Support Group    Healthy Lifestyle Virtual Support Group?    This group is held monthly on a Friday from 12:30 PM - to 1:30 PM. It is 60 minutes of small group guided discussion, support and resources led by National Board Certified Health , Kimmie Suero. All are welcome who want a healthy lifestyle. To receive monthly invites to the Healthy Lifestyle Virtual Support Group or if you have any questions about having a health  or attending support group, please email Kimmie [email or web address]. Kimmie will send out invites for each session, with the phone number and the conference ID number specific to that session.    2021 Meetings    January 29 - How to Stay Active and Healthy during the Winter Months    February 26 - Reading Food Labels: What do I Need to Know?  Guest Speaker: Rhiannon Garcia RD    March 19 - Finding Health, Happiness and Confidence at Every Size  Guest Speaker: Janeth Crarera, Health Psychology Fellow    April 30 - Healthy Eating on a Budget  Guest Speaker: Chelsi Verduzco RD    May 21 - Open Forum    Jun 25 - Self Compassion    July 30 - Habits: Helpful and Hindering    August 27 - Open Forum    September 24 - Sleep    October 29 - Open Forum    November - To be determined    December - To be determined    [facility] Weight Loss Surgery Support Group    [facility] Weight Loss Surgery Support Group    The [facility] Weight Loss Surgery Support Group is a patient-lead forum that meets monthly. Due to Covid-19, we are meeting remotely from 5 PM - 6  "PM via Microsoft Teams on the 3rd Wednesday of the month. The group is facilitated by Debi Bonilla, the program s Clinical Coordinator. The support group shares experiences, encouragement and education. It is open to those who have had weight loss surgery, are scheduled for surgery, and those who are considering surgery.   If you are interested in attending, please contact the clinic via openPeoplet or call the nurse line directly at [phone] to inform our staff that you would like an invite sent to you. Please let us know the email you would like the invite sent to. Prior to the meeting, a link with directions on how to join the meeting will be sent to you.    2021 Meetings    January 20 - Guest Speaker: Lewis Bhatt RD, LD     February 17 - The group will not have a speaker. This will be a time of group support.     March 17 - Guest Speaker: Ana Justin, [facility], [facility], CPT Health     April 21 - Guest Speakers. Ghassan Lee and Milagros will be talking about Mindful Eating and Mind Hunger.     May 19- No Group this month.     June 16 - \"Let's Talk\" a group discussion and time to talk and ask questions and learn from one another.    [facility] Support Groups    Connections: Bariatric Care Support Group?  This group takes place the second Tuesday of each month from 6:30 PM - 8 PM virtually using Microsoft Teams. It is led by Jesus Hahn, Ph.D who is a Licensed Psychologist with the [facility] Comprehensive Weight Management Program. There is no cost for group participation and it is open to all [facility] (and those external to this program) pre- and post- operative bariatric surgery patients as well as their support system.   Please send an email to Jesus Hahn, Ph.D., LP at?[email or web address]?if you would like an invitation to the group and to learn about using Microsoft Teams.    Connections: Post-Operative Bariatric Surgery Support Group  This " support group meets the 4th Wednesday of the month from 11 AM - 12 PM virtually using Microsoft Teams. It is led by Kimmie Monk RN. This is a support group for [facility] bariatric patients (and those external to [facility]) who have had bariatric surgery and are at least 3 months post-surgery. There is no cost to attend and registration is not required. Please send an email to Kimmie Monk RN at [email or web address] if you would like an invitation to the group and to learn about using Microsoft Teams.      The above Support Group information can also be found on our websites:    [email or web address]  _________________________________________________________________________________________________________________________________________________________  [place] [facility] Get Moving Program  Our team of physical therapists is trained to help you understand and take control of your condition. They will perform a thorough evaluation to determine your ability for activity and develop a customized plan to fit your goals and physical ability.  Scheduling: Unsure if the Get Moving program is right for you? Discuss the program with your medical provider or diabetes educator. You can also call us at [phone] to ask questions or schedule an appointment.   [facility] Get Moving Program  _________________________________________________________________________________________________________________________________________________________   [facility] Diabetes Prevention Program (DPP)  If you have prediabetes and Medicare please contact us via nanoMRhart to learn more about the Diabetes Prevention Program (DPP)  Program Details:   [facility] offers the year-long Diabetes Prevention Program (DPP). The program helps you to make lifestyle changes that prevent or delay type 2 diabetes by supporting  healthy eating, increased physical activity, stress reduction and use of coping skills.   On average, previous [facility] DPP cohorts have lost and maintained at least 5% of their starting weight throughout the program and averaged more than 150 minutes of physical activity per week.  Participants meet weekly for one-hour group sessions over sixteen weeks, every other week for the next 8 weeks, and monthly for the last six months.   A year-long maintenance program is also available for participants who complete the first year.   Location & Cost:   During the COVID-19 Public Health Emergency, the program is offered virtually. When in-person classes can resume, they will be held at [facility].  For people with Medicare, the program is covered in full. A self-pay option will also be available for those with non-Medicare insurance plans.   _________________________________________________________________________________________________________________________________________________________  Bluetooth Scale:    We hope to provide you with high quality virtual healthcare visits while social distancing for COVID-19 is necessary, as well as in the future when virtual visits may be more convenient for you.     Our technology team made it possible for Bluetooth scales to send weight measurements to our electronic medical record. This allows weights from you weighing at home to securely flow into the medical record, which will improve telephone and virtual visits.   Additionally, studies have shown that adults actually lose more weight when their weights are automatically sent to someone else, and also that this process is not stressful for those adults.    Below is a link for purchasing the scale, with a discount code for our patients. You may call your insurance company to see if they will reimburse you for the cost of the scale, as a piece of durable medical equipment. The scales  only go up to a weight of 400 pounds. This is an issue and we are working with the developer on increasing this. We found no scales that go over 400lb that have blue-tooth for connecting to BlueSnap.    Scale to purchase: the [employer]  Body  Scale: [email or web address]/en/body/shop?[email or web address]=aw.ds    Discount Code: We have a discount code for our patients to bring the cost down to $50, Discount code is: [place]_Scale_20%off      Thank you,   [facility] Comprehensive Weight Management Team                              MEDICATION STARTED AT THIS APPOINTMENT    We are starting a GLP-1 (Glucagon-like Peptide-1) medication. Examples of this medication include Byetta, Bydureon, or Victoza, etc. The medication chosen will depend on insurance coverage, and can be changed to the medication that is covered under your plan. These medications work the same, in that they can help you lose weight and control your blood sugar. One of the ways it works is by slowing down the rate that food leaves your stomach. You feel gregg and will eat less.  It also helps regulate hormones that can help improve your blood sugars.    Usually short acting insulins or oral agents are stopped or decreased by the doctor at the appointment. Low blood sugars are rare but can happen if patients are on insulin or other oral agents. If you notice consistent low sugars or high sugars, your medication may need to be adjusted after your appointment. If this is the case, please call RN and provide her your blood sugar record from the last 3-4 days. The RN will get in touch with the doctor and call you back/Yozons message with recommendations. We tolerate high sugars for a bit, so if sugars are running 180-200, this is ok. As weight starts dropping the blood sugars should too. If readings are consistently over 200 for 1-2 weeks, then you should call the doctor/nurse.    Types of GLP-1  medications include:    Victoza: Starting dose is 0.6 mg for a week, then 1.2 mg for a week, and then 1.8 mg thereafter (if prescribed by doctor). This medication is given daily. Pen needles need to be ordered also.    Ozempic: Starting dose is 0.25 mg once weekly for 4 weeks, and then increase to 0.5 mg weekly. If after 4 weeks of being on 0.5 mg weekly dosing and still wanting additional weight loss and/or blood sugar benefits, check with your doctor to see if they want to increase the dose to 1 mg weekly. Pen needles come in the Ozempic pen box.    Trulicity: Starting dose is 0.75 mg once weekly.  If after 1-3 months of being on 0.75 mg weekly and still wanting additional weight loss and/or blood sugar benefits, check with your doctor to see if they want to increase the dose to 1.5 mg weekly.    Bydureon: Starting dose is 2 mg and is given weekly. The patient should pick one day a week and give the medication on that day. Pen needles need to be ordered also.    Byetta: Starting dose is 5 mcg twice daily. This is given for the first month. Check with the doctor after a month to see if they want the dose increased to 10 mcg BID. Pen needles need to be ordered also.     Side effects of GLP- Medications include: The most common side effects are all GI related and consist of: nausea, constipation, diarrhea, burping, or gassiness. Patients are advised to eat slowly and less, and nausea typically passes if people can stick it out.     The risk of pancreatitis (inflammation of the pancreas) has been associated with this type of medication, but is very rare.  If you have had pancreatitis in the past, this medication may not be for you. Please let us know about any past history of pancreas problems.      Symptoms of pancreatitis include: Pain in your upper stomach area which  may travel to your back and be worse after eating. Your stomach area may be tender to the touch.  You may have vomiting or nausea and/or have a  fever. If you should develop any of these symptoms, stop the medication and contact your primary care doctor. They will do a blood test to check for pancreatitis.         There is a small chance you may have some low blood sugar after taking the medication.   The signs of low blood sugar are:  o Weakness  o Shaky   o Hungry  o Sweating  o Confusion      See below for ways to treat low blood sugar without adding in lots of extra calories.      Treating Low Blood Sugar    If you have symptoms of low blood sugar (sweating, shaking, dizzy, confused) eat 15 grams of carbs and wait 15 minutes:      Glucose Tabs are best for sugars under 70 -  Dex4 or BD Glucose tablets are good, you will need to take 3-4 of these to equal 15 grams.       One small box of raisins    4 oz fruit juice box or   cup fruit juice    1 small apple    1 small banana      cup canned fruit in water      English muffin or a slice of bread with jelly     1 low fat frozen waffle with sugar-free syrup      cup cottage cheese with   cup frozen or fresh blueberries    1 cup skim or low-fat milk      cup whole grain cereal    4-6 crackers such as Triscuits      This medication is usually covered by insurance for patients with a diagnosis of Type 2 Diabetes. Depending on insurance coverage, the medication may be changed to a different formulary, but they all work in the same way. Sometimes a prior authorization is required, which may take up to 1-2 weeks for an insurance company to make a decision if they will cover the medication. Please be patient, you will be notified either way.     Contact the nurse via Site Tour or call [phone] if you have any questions or concerns. (Do not stop taking it if you don't think it's working. For some people it works without them knowing it.)     In order to get refills of this or any medication we prescribe you must be seen in the medical weight mgmt clinic every 2-4 months.

## 2021-06-24 ENCOUNTER — TELEPHONE (OUTPATIENT)
Dept: ENDOCRINOLOGY | Facility: CLINIC | Age: 46
End: 2021-06-24

## 2021-06-24 DIAGNOSIS — E66.01 CLASS 3 SEVERE OBESITY DUE TO EXCESS CALORIES WITH SERIOUS COMORBIDITY AND BODY MASS INDEX (BMI) OF 40.0 TO 44.9 IN ADULT (H): ICD-10-CM

## 2021-06-24 DIAGNOSIS — E66.813 CLASS 3 SEVERE OBESITY DUE TO EXCESS CALORIES WITH SERIOUS COMORBIDITY AND BODY MASS INDEX (BMI) OF 40.0 TO 44.9 IN ADULT (H): ICD-10-CM

## 2021-06-24 NOTE — ASSESSMENT & PLAN NOTE
Struggled with weight all of life. Always aware of and had feelings about weight. Dieting much of life. 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 Weight gain with pregnancies also. Weight loss with structured diet has always been difficult. Did have 60lb weight loss when developed hyperthyroidism - able to keep this weight off for a couple of years. More significant and steady weight gain in the last few years.     Weight gain now associated with knee and foot pain. This is limiting activity and causing daily pain, especially with work. Looking to improve health and prevent potential co morbidities by losing weight. Many family members also struggle with weight and want to do this together.     Some comfort/ reward pathway with food described. Discussed contrave. Mood currently stable on cymbalta, higher dose. Will avoid contrave for now.     Also struggles with hunger and stress eating.   Patient interested in starting Wegovy. No hx of pancreatitis or medullary thyroid cancer. No current reflux. Discussed taper up and typical side effects. Will refer to MTM for close follow up with titration.     Discussed 24 week healthy lifestyle plan. Already scheduled with RD. Patient will consider. Lots of stress with kids right now. May not be a good time logistically. Will consider. Wife is also considering mwm with our program.

## 2021-06-24 NOTE — TELEPHONE ENCOUNTER
Semaglutide,0.25 or 0.5MG/DOS, 2 MG/1.5ML SOPN  Last Written Prescription Date:  6/23/2021  Last Fill Quantity: 1.5,   # refills: 1  Last Office Visit : 6/23/2021  Future Office visit:  6/29/2021    Routing refill request to provider for review/approval because:  These ICD Codes [E66.01, Z68.41] are not working for the medication.    Please choose alternate ICD Codes for this med and update pharmacy.   Thank you       Krystle Hurley RN  Central Triage Red Flags/Med Refills

## 2021-06-24 NOTE — TELEPHONE ENCOUNTER
Prior Authorization Retail Medication Request    Medication/Dose: Ozempic 0.25 or 0.5mg/Dose  ICD code (if different than what is on RX):    Previously Tried and Failed:   Rationale:      Insurance Name:  [employer]  Insurance ID:  [id number]      Pharmacy Information (if different than what is on RX)  Name:  [facility]   Phone:  [phone]    Rejection message from pharmacy: Prerequisite therapy required    Thank you,  Yessy Berger CPhT  [facility] [place]

## 2021-06-25 NOTE — TELEPHONE ENCOUNTER
PA is for Wegovy, but Rx is written for Ozempic strength of semaglutide. Routing back to clinic for corrected Rx. (Pharmacy is trying to fill Ozempic).

## 2021-06-28 DIAGNOSIS — E66.01 CLASS 3 SEVERE OBESITY DUE TO EXCESS CALORIES WITH SERIOUS COMORBIDITY AND BODY MASS INDEX (BMI) OF 40.0 TO 44.9 IN ADULT (H): ICD-10-CM

## 2021-06-28 DIAGNOSIS — E66.813 CLASS 3 SEVERE OBESITY DUE TO EXCESS CALORIES WITH SERIOUS COMORBIDITY AND BODY MASS INDEX (BMI) OF 40.0 TO 44.9 IN ADULT (H): ICD-10-CM

## 2021-06-28 LAB
ANION GAP SERPL CALCULATED.3IONS-SCNC: 5 MMOL/L (ref 3–14)
BUN SERPL-MCNC: 20 MG/DL (ref 7–30)
CALCIUM SERPL-MCNC: 8.3 MG/DL (ref 8.5–10.1)
CHLORIDE SERPL-SCNC: 107 MMOL/L (ref 94–109)
CO2 SERPL-SCNC: 28 MMOL/L (ref 20–32)
CREAT SERPL-MCNC: 0.75 MG/DL (ref 0.52–1.04)
GFR SERPL CREATININE-BSD FRML MDRD: >90 ML/MIN/{1.73_M2}
GLUCOSE SERPL-MCNC: 88 MG/DL (ref 70–99)
HBA1C MFR BLD: 5.3 % (ref 0–5.6)
POTASSIUM SERPL-SCNC: 3.9 MMOL/L (ref 3.4–5.3)
SODIUM SERPL-SCNC: 139 MMOL/L (ref 133–144)

## 2021-06-28 PROCEDURE — 36415 COLL VENOUS BLD VENIPUNCTURE: CPT | Performed by: NURSE PRACTITIONER

## 2021-06-28 PROCEDURE — 83036 HEMOGLOBIN GLYCOSYLATED A1C: CPT | Performed by: NURSE PRACTITIONER

## 2021-06-28 PROCEDURE — 80048 BASIC METABOLIC PNL TOTAL CA: CPT | Performed by: NURSE PRACTITIONER

## 2021-06-28 NOTE — TELEPHONE ENCOUNTER
The pharmacy was meant to select Wegovy, but the computer system auto-populated to fill Ozempic. The pharmacy has changed the prescription to the prescribed Wegovy and gotten a paid claim.    Thank you,  Yessy Berger, [facility] [place]

## 2021-08-09 ENCOUNTER — VIRTUAL VISIT (OUTPATIENT)
Dept: PHARMACY | Facility: CLINIC | Age: 46
End: 2021-08-09
Attending: NURSE PRACTITIONER
Payer: COMMERCIAL

## 2021-08-09 DIAGNOSIS — E89.0 HYPOTHYROIDISM, POSTSURGICAL: ICD-10-CM

## 2021-08-09 DIAGNOSIS — F33.42 RECURRENT MAJOR DEPRESSIVE DISORDER, IN FULL REMISSION (H): ICD-10-CM

## 2021-08-09 DIAGNOSIS — J30.2 SEASONAL ALLERGIES: ICD-10-CM

## 2021-08-09 DIAGNOSIS — E66.01 CLASS 3 SEVERE OBESITY DUE TO EXCESS CALORIES WITH SERIOUS COMORBIDITY AND BODY MASS INDEX (BMI) OF 40.0 TO 44.9 IN ADULT (H): Primary | ICD-10-CM

## 2021-08-09 DIAGNOSIS — G25.81 RESTLESS LEGS SYNDROME: ICD-10-CM

## 2021-08-09 DIAGNOSIS — E66.813 CLASS 3 SEVERE OBESITY DUE TO EXCESS CALORIES WITH SERIOUS COMORBIDITY AND BODY MASS INDEX (BMI) OF 40.0 TO 44.9 IN ADULT (H): Primary | ICD-10-CM

## 2021-08-09 PROCEDURE — 99605 MTMS BY PHARM NP 15 MIN: CPT | Performed by: PHARMACIST

## 2021-08-09 PROCEDURE — 99607 MTMS BY PHARM ADDL 15 MIN: CPT | Performed by: PHARMACIST

## 2021-08-09 NOTE — PATIENT INSTRUCTIONS
Recommendations from today's MTM visit:                                                    MTM (medication therapy management) is a service provided by a clinical pharmacist designed to help you get the most of out of your medicines.   Today we reviewed what your medicines are for, how to know if they are working, that your medicines are safe and how to make your medicine regimen as easy as possible.      1. Try miralax 1 capful as needed or senna-s 8.6 mg-50 mg daily as needed for constipation.     2. Increase water intake to at least 64 oz daily.     3. Increase Wegovy to 1 mg weekly after completing Wegovy 0.5 mg weekly   -If constipation doesn't improve with the above means, may want to stay at 0.5 mg weekly. Mychart if that is the case to send in additional prescription for Wegovy 0.5 mg weekly.     4. Take ferrous sulfate every two to three days.     5. Schedule follow up with Lisa Andre CNP for 1 month from now - [phone]      Follow-up: Return in about 8 weeks (around 10/4/2021) for Medication Therapy Management Pharmacist Visit, Call [phone] to schedule.    It was great to speak with you today.  I value your experience and would be very thankful for your time with providing feedback on our clinic survey. You may receive a survey via email or text message in the next few days.         My Clinical Pharmacist's contact information:                                                      Please feel free to contact me with any questions or concerns you have.      Lauren Bloch, PharmD  Medication Therapy Management Pharmacist   [facility] [place]

## 2021-08-09 NOTE — PROGRESS NOTES
Medication Therapy Management (MTM) Encounter    ASSESSMENT:                            Medication Adherence/Access: No issues identified    Obesity: Would benefit from increasing water intake to at least 64 oz daily to help improve constipation. Discussed use of miralax or senna-s as needed for constipation. Patient may benefit from increasing Wegovy to 1 mg weekly for furthering weight loss, however can increase constipation risk. Therefore, discussed patient reaching out if constipation hasn't improved with the above items as may want to continue at Wegovy 0.5 mg weekly.     Hypothyroidism: Stable. TSH at goal, due in a couple months.     Depression:  Stable.     Restless Leg Syndrome: Discussed taking iron every other day or every third day may improve iron absorption rather than taking daily. Discussed ferritin goal of at least 45 for improvement of RLS symptoms. Increasing water intake may be helpful for muscle cramping/chad horses. Iron could be contributing factor to constipation.     Allergic Rhinitis: Stable.     PLAN:                            1. Try miralax 1 capful as needed or senna-s 8.6 mg-50 mg daily as needed for constipation.     2. Increase water intake to at least 64 oz daily.     3. Increase Wegovy to 1 mg weekly after completing Wegovy 0.5 mg weekly   -If constipation doesn't improve with the above means, may want to stay at 0.5 mg weekly. Mychart if that is the case to send in additional prescription for Wegovy 0.5 mg weekly.     4. Take ferrous sulfate every two to three days.     5. Schedule follow up with Lisa Andre CNP for 1 month from now - [phone]    Follow-up: Return in about 8 weeks (around 10/4/2021) for Medication Therapy Management Pharmacist Visit, Call [phone] to schedule.    SUBJECTIVE/OBJECTIVE:                          Kaleigh Nam is a 46 year old female called for an initial visit. She was referred to me from Lisa Andre CNP.      Reason for visit: Wegovy  "start check in. She wants to know if levothyroxine interacts with Wegovy.     Allergies/ADRs: Reviewed in chart  Past Medical History: Reviewed in chart  Tobacco: She reports that she has never smoked. She has never used smokeless tobacco.  Alcohol: 1-3 beverages / week (wine)     Medication Adherence/Access: no issues reported    Obesity:   Wegovy 0.5 mg weekly    Followed by Lisa Andre NP, seen 6/23/2021 for New Medical Weight Management. Patient is experiencing the follow side effects: constipation - more difficulty passing stool and not going to the bathroom as frequently. Bowel movement now every other day.   Weight History: Has tried noom and WW. Struggled with weight her life, does find that perimenopausal state is contributing factor as well. Weight gain with pregnancies. Finds structured diet difficult at times. Was able to lose 60 lb and was able to maintain, now weight gain has more slowly come on again.   Diet/Eating Habits: Patient reports she eats quite a few fruits and vegetables.  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 She is noticing less appetite, smaller portions. She doesn't have craving for carbohydrates/breads anymore, so not eating as often.   Exercise/Activity: Patient reports she is not typically active for the purpose of physical activity.     Current weight today: 268 lb   Initial Consult Weight 6/23/2021: 275 lb  Cumulative Weight Loss: -7 lb  Wt Readings from Last 4 Encounters:   06/23/21 275 lb (124.7 kg)   06/21/21 275 lb (124.7 kg)   10/02/20 260 lb 9.6 oz (118.2 kg)   01/03/20 263 lb (119.3 kg)     Estimated body mass index is 44.39 kg/m  as calculated from the following:    Height as of 6/23/21: 5' 6\" (1.676 m).    Weight as of 6/23/21: 275 lb (124.7 kg).    Hypothyroidism:   Levothyroxine 137 mcg daily.     Patient is having the following symptoms: none.   TSH   Date Value Ref Range Status "   10/02/2020 2.38 0.40 - 4.00 mU/L Final     Depression:    Duloxetine 60mg once daily.     Pt reports that depression symptoms are stable. Failed medications: escitalopram - worsening of RLS symptoms.   PHQ-9 SCORE 4/26/2019 12/16/2019 10/2/2020   PHQ-9 Total Score MyChart - 4 (Minimal depression) -   PHQ-9 Total Score 1 4 4     Restless Leg Syndrome:   Ferrous Sulfate 325 mg as needed, usually every 3-4 days when remembers/has symptoms.    She finds that her RLS symptoms are also related to pain or being on feet all day. She does find she gets leg cramps/chad horses and fatigue from being on feet all day - days like this contributes to greater risk of RLS symptoms. Occurs at night.   Ferritin   Date Value Ref Range Status   10/02/2020 17 8 - 252 ng/mL Final     Hemoglobin   Date Value Ref Range Status   01/02/2015 11.8 11.7 - 15.7 g/dL Final     Allergic Rhinitis:   Loratadine 10mg once daily as needed.     Patient feels that current therapy is effective when needed.   ----------------      I spent 20 minutes with this patient today. All changes were made via collaborative practice agreement with Lisa Andre CNP. A copy of the visit note was provided to the patient's referring provider.    The patient was sent via Qranio a summary of these recommendations.     Lauren Bloch, PharmD  Medication Therapy Management Pharmacist   [facility] [place]    Telemedicine Visit Details  Type of service:  Telephone visit  Start Time: 9:15 AM  End Time: 9:35 AM  Originating Location (patient location): Home  Distant Location (provider location):  [facility]     Medication Therapy Recommendations  Class 3 severe obesity due to excess calories with serious comorbidity and body mass index (BMI) of 40.0 to 44.9 in adult (H)    Current Medication: Semaglutide,0.25 or 0.5MG/DOS, 2 MG/1.5ML SOPN   Rationale: Undesirable effect - Adverse medication event -  Safety   Recommendation: Start Medication - MiraLax 17 GM/SCOOP Powd - 1 capful daily as needed   Status: Accepted - no CPA Needed   Note: or senna-s as needed          Current Medication: Semaglutide,0.25 or 0.5MG/DOS, 2 MG/1.5ML SOPN   Rationale: Dose too low - Dosage too low - Effectiveness   Recommendation: Increase Dose - Semaglutide (1 MG/DOSE) 4 MG/3ML Sopn - 1 mg weekly   Status: Accepted per CPA         Restless legs syndrome    Current Medication: ferrous sulfate (FEROSUL) 325 (65 Fe) MG tablet   Rationale: Patient forgets to take - Adherence - Adherence   Recommendation: Provide Adherence Intervention - take every 2-3 days consistently   Status: Patient Agreed - Adherence/Education

## 2021-08-09 NOTE — Clinical Note
New MWM - wegovy start, on 0.5 mg weekly for now - 2nd dose. Doing well except constipation so talked about miralax or senna-s and increasing water intake. Getting good fiber in diet. I told her I was going to send in for 1 mg weekly. Told her if constipation doesn't improve may want to stay at 0.5 mg weekly for additional month. She will keep me posted. Told her to follow up with you in 1month. Aster ALBA

## 2021-08-09 NOTE — PROGRESS NOTES
"Kaleigh Nam is a 46 year old female who is being evaluated via a billable video visit.      The patient has been notified of following:     \"This video visit will be conducted via a call between you and your physician/provider. We have found that certain health care needs can be provided without the need for an in-person physical exam.  This service lets us provide the care you need with a video conversation.  If a prescription is necessary we can send it directly to your pharmacy.  If lab work is needed we can place an order for that and you can then stop by our lab to have the test done at a later time.    Video visits are billed at different rates depending on your insurance coverage.  Please reach out to your insurance provider with any questions.    If during the course of the call the physician/provider feels a video visit is not appropriate, you will not be charged for this service.\"    Patient has given verbal consent for Video visit? Yes  How would you like to obtain your AVS? MyChart  If you are dropped from the video visit, the video invite should be resent to: Text to cell phone: [phone]  Will anyone else be joining your video visit? No  {If patient encounters technical issues they should call [phone]      Video-Visit Details    Type of service:  Video Visit    Video Start Time: 7:01 AM  Video End Time: 7:30 AM    Originating Location (pt. Location): Home    Distant Location (provider location):  [facility] [place]     Platform used for Video Visit: YouWeb    During this virtual visit the patient is located in [place], patient verifies this as the location during the entirety of this visit.     New Weight Management Nutrition Consultation    Kaleigh Nam is a 46 year old female presents today for new weight management nutrition consultation.  Patient referred by Lisa Andre NP on June 23, 2021.    Patient with Co-morbidities of obesity " "including:  Type II DM no (pre-diabetes)  Renal Failure no  Sleep apnea no  Hypertension no   Dyslipidemia no  Joint pain yes  Back pain yes  GERD yes     PMHx: Hypothyroidism (Levothyroxine)    Anthropometrics:  Estimated body mass index is 44.39 kg/m  as calculated from the following:    Height as of 6/23/21: 1.676 m (5' 6\").    Weight as of 6/23/21: 124.7 kg (275 lb).    Medications for Weight Loss:  Wegovy    NUTRITION HISTORY  See MD note for details.    Pt reports decreased/controlled appetite taking Wegovy. Motivated to make changes to promote a healthy lifestyle for the whole family.  Working on implementing breakfast and healthy breakfast choices (when going to work: hard-boiled eggs + fruit).    Recent food recall:  Breakfast: coffee, almond pastry  Lunch: broccoli cheddar soup, multigrain crackers/chips  Dinner: Ordered Applesbees (shared: Ribs, some fries, Asian cx salad, fried chicken tenders, artichoke dip)  Snacks: cheese and apple, coffee and cookie, greek yogurt,   Beverages: coffee (morning/afternoon with half and half, going to work caramel Morningstariato with skim/lite syrups), no soda, flavored soda water, body armor occ  Alcohol: rarely (once/month)  Dining out: take out once/twice a week      Physical Activity:  Walking 2 x week  Limited d/t pain (h/o torn meniscus and more challenging with weight gain)    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  1500 calories/day (per BEE or ~1900 alexia/day and loss of 1 lb per week w/o exercise)  Volumetrics/Plate Method    Nutrition Diagnosis  Food and nutrition related knowledge deficit r/t lack of prior exposure to calorie counting and nutrition education aeb pt unable to verbalize understanding of volumetrics diet for weight loss.  and  Obesity r/t long history of self-monitoring deficit and excessive energy intake aeb BMI " ">30.    Nutrition Intervention  Materials/education provided on Volumetric eating to help satiety level on fewer calories; portion control and healthy food choices (Plate Method and Volumetrics handouts), 100 calorie snack choices, meal and snack planning and websites, sample meal plans     Patient demonstrates understanding.    Expected Engagement: good  Follow-Up Plans: meal planning, calorie tracking, stress/boredom eating    Nutrition Goals  1) Food Journal/ Meal Log (phone apps: Profusa, LearnUpon, Sionexit)   - identifying themes (times of day, specific foods, portions, emotions etc)    2). Eat slowly (20-30 minutes per meal), chewing foods well (25 chews per bite/applesauce consistency)   - taking smaller bites, setting down utensils, pausing and checking in with satiety cues, stopping when feeling satisfied    3). 9\" Plate method (1/2 plate non-starchy vegetables/fruit, 1/4 plate lean protein, 1/4 plate whole grain starch - no more than 1/2 cup carb/meal) - see handout below    4). Avoid/reduce calorie-containing beverages    5). Consume 64 oz fluid/day (increase on active days)   - track fluid intake, carry water bottle, reminders to stay hydrated    6). Increase activity as able    The Plate Method  [email or web address]    Protein Sources   [email or web address]     Carbohydrates  [email or web address]     Mindful Eating  [email or web address]     Summary of Volumetrics Eating Plan  [email or web address]     Healthy Snack Ideas:   - 1/2 c cottage cheese 3/4 cup fruit (berries, peaches)  - sliced apple with cheedar cheese slice or 1T peanut butter  - snap peas/carrots/whole grain crackers with hummus  - serving fruit (bananas, kiwi, apple, oranges, grapes)  - hard boiled eggs  - 2 cup popcorn (plain)  - 15 almonds, walnuts, cashews  - carrot/celery sticks and 2 tbsp low-fat ranch  - Part-skim mozzarella cheese stick  - Low-fat, low-sugar greek yogurt with 1/2 " "cup berries  - sliced bell peppers with 1/2 cup salsa  - sliced cucumbers with vinegar    Healthy Recipe Resources:  \"The Volumetrics Eating Plan\" by Kerry Gonzales, Ph.D.  https://www.[employer]oom Financial.Endocyte/  www.Vital Connect  www.Moni.org  Dash Diet Recipes [facility]  www.extension.[facility] - the recipe box  \"Cooking that Counts\" by editors of Whisk  https://www.[facility]/communityculinary/[facility]_Cookbook_KT_Final_11-6_NoCrops-compressed.pdf  Https://www.choosemyplate.gov/myplatekitchen  https://snaped.fns.usda.gov/recipes-menus - SNAP recipes  https://www.diabetesfoodhub.org/all-recipes.html  https://www.VIDA Software.com/      Follow-Up:  1 month or PRN    Time spent with patient: 29 minutes.  Hailey Mcneil RD, LD        "

## 2021-08-10 ENCOUNTER — VIRTUAL VISIT (OUTPATIENT)
Dept: ENDOCRINOLOGY | Facility: CLINIC | Age: 46
End: 2021-08-10
Payer: COMMERCIAL

## 2021-08-10 DIAGNOSIS — E66.9 OBESITY: ICD-10-CM

## 2021-08-10 DIAGNOSIS — Z71.3 NUTRITIONAL COUNSELING: Primary | ICD-10-CM

## 2021-08-10 PROCEDURE — 97802 MEDICAL NUTRITION INDIV IN: CPT | Mod: GT | Performed by: DIETITIAN, REGISTERED

## 2021-08-10 NOTE — LETTER
"8/10/2021       RE: Kaleigh Nam  [address] [address]     Dear Colleague,    Thank you for referring your patient, Kaleigh Nam, to the [facility] [place] [facility]. Please see a copy of my visit note below.    Kaleigh Nam is a 46 year old female who is being evaluated via a billable video visit.      The patient has been notified of following:     \"This video visit will be conducted via a call between you and your physician/provider. We have found that certain health care needs can be provided without the need for an in-person physical exam.  This service lets us provide the care you need with a video conversation.  If a prescription is necessary we can send it directly to your pharmacy.  If lab work is needed we can place an order for that and you can then stop by our lab to have the test done at a later time.    Video visits are billed at different rates depending on your insurance coverage.  Please reach out to your insurance provider with any questions.    If during the course of the call the physician/provider feels a video visit is not appropriate, you will not be charged for this service.\"    Patient has given verbal consent for Video visit? Yes  How would you like to obtain your AVS? MyChart  If you are dropped from the video visit, the video invite should be resent to: Text to cell phone: [phone]  Will anyone else be joining your video visit? No  {If patient encounters technical issues they should call [phone]      Video-Visit Details    Type of service:  Video Visit    Video Start Time: 7:01 AM  Video End Time: 7:30 AM    Originating Location (pt. Location): Home    Distant Location (provider location):  [facility]     Platform used for Video Visit: [employer] [place] [employer]    During this virtual visit the patient is located in [place], " "patient verifies this as the location during the entirety of this visit.     New Weight Management Nutrition Consultation    Kaleigh Nam is a 46 year old female presents today for new weight management nutrition consultation.  Patient referred by Lisa Andre NP on June 23, 2021.    Patient with Co-morbidities of obesity including:  Type II DM no (pre-diabetes)  Renal Failure no  Sleep apnea no  Hypertension no   Dyslipidemia no  Joint pain yes  Back pain yes  GERD yes     PMHx: Hypothyroidism (Levothyroxine)    Anthropometrics:  Estimated body mass index is 44.39 kg/m  as calculated from the following:    Height as of 6/23/21: 1.676 m (5' 6\").    Weight as of 6/23/21: 124.7 kg (275 lb).    Medications for Weight Loss:  Wegovy    NUTRITION HISTORY  See MD note for details.    Pt reports decreased/controlled appetite taking Wegovy. Motivated to make changes to promote a healthy lifestyle for the whole family.  Working on implementing breakfast and healthy breakfast choices (when going to work: hard-boiled eggs + fruit).    Recent food recall:  Breakfast: coffee, almond pastry  Lunch: broccoli cheddar soup, multigrain crackers/chips  Dinner: Ordered Applesbees (shared: Ribs, some fries, Asian cx salad, fried chicken tenders, artichoke dip)  Snacks: cheese and apple, coffee and cookie, greek yogurt,   Beverages: coffee (morning/afternoon with half and half, going to work caramel machiato with skim/lite syrups), no soda, flavored soda water, body armor occ  Alcohol: rarely (once/month)  Dining out: take out once/twice a week      Physical Activity:  Walking 2 x week  Limited d/t pain (h/o torn meniscus and more challenging with weight gain)    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  1500 calories/day (per BEE or ~1900 alexia/day and loss of 1 lb per week w/o exercise)  Volumetrics/Plate " "Method    Nutrition Diagnosis  Food and nutrition related knowledge deficit r/t lack of prior exposure to calorie counting and nutrition education aeb pt unable to verbalize understanding of volumetrics diet for weight loss.  and  Obesity r/t long history of self-monitoring deficit and excessive energy intake aeb BMI >30.    Nutrition Intervention  Materials/education provided on Volumetric eating to help satiety level on fewer calories; portion control and healthy food choices (Plate Method and Volumetrics handouts), 100 calorie snack choices, meal and snack planning and websites, sample meal plans     Patient demonstrates understanding.    Expected Engagement: good  Follow-Up Plans: meal planning, calorie tracking, stress/boredom eating    Nutrition Goals  1) Food Journal/ Meal Log (phone apps: GOPOP.TV, Cordia, Soup.ioit)   - identifying themes (times of day, specific foods, portions, emotions etc)    2). Eat slowly (20-30 minutes per meal), chewing foods well (25 chews per bite/applesauce consistency)   - taking smaller bites, setting down utensils, pausing and checking in with satiety cues, stopping when feeling satisfied    3). 9\" Plate method (1/2 plate non-starchy vegetables/fruit, 1/4 plate lean protein, 1/4 plate whole grain starch - no more than 1/2 cup carb/meal) - see handout below    4). Avoid/reduce calorie-containing beverages    5). Consume 64 oz fluid/day (increase on active days)   - track fluid intake, carry water bottle, reminders to stay hydrated    6). Increase activity as able    The Plate Method  [email or web address]    Protein Sources   [email or web address]     Carbohydrates  [email or web address]     Mindful Eating  [email or web address]     Summary of Volumetrics Eating Plan  [email or web address]     Healthy Snack Ideas:   - 1/2 c cottage cheese 3/4 cup fruit (berries, peaches)  - sliced apple with cheedar cheese slice or 1T peanut " "butter  - snap peas/carrots/whole grain crackers with hummus  - serving fruit (bananas, kiwi, apple, oranges, grapes)  - hard boiled eggs  - 2 cup popcorn (plain)  - 15 almonds, walnuts, cashews  - carrot/celery sticks and 2 tbsp low-fat ranch  - Part-skim mozzarella cheese stick  - Low-fat, low-sugar greek yogurt with 1/2 cup berries  - sliced bell peppers with 1/2 cup salsa  - sliced cucumbers with vinegar    Healthy Recipe Resources:  \"The Volumetrics Eating Plan\" by Kerry Gonzales, Ph.D.  https://www.GOkey.[employer]/  www.[employer]  www.Fighters.org  Dash Diet Recipes [facility]  www.extension.[facility] - the recipe box  \"Cooking that Counts\" by editors of [employer]  https://www.[facility]/communityculinary/[facility]_Cookbook_KT_Final_11-6_NoCrops-compressed.pdf  Https://www.choosemyplate.gov/myplatekitchen  https://snaped.fns.usda.gov/recipes-menus - SNAP recipes  https://www.diabetesfoodhub.org/all-recipes.html  https://www.Instagram.com/      Follow-Up:  1 month or PRN    Time spent with patient: 29 minutes.  Hailey Mcneil RD, LD    "

## 2021-08-10 NOTE — PATIENT INSTRUCTIONS
"Dimitry Del Rosario,     Follow-up with RD in 1 month    Thank you,    Hailey Mcneil, RD, LD  If you would like to schedule or reschedule an appointment with the RD, please call [phone]    Nutrition Goals    1) Food Journal/ Meal Log (phone apps: Noom, MyFitnessPal, Lose-it)   - identifying themes (times of day, specific foods, portions, emotions etc)    2). Eat slowly (20-30 minutes per meal), chewing foods well (25 chews per bite/applesauce consistency)   - taking smaller bites, setting down utensils, pausing and checking in with satiety cues, stopping when feeling satisfied    3). 9\" Plate method (1/2 plate non-starchy vegetables/fruit, 1/4 plate lean protein, 1/4 plate whole grain starch - no more than 1/2 cup carb/meal) - see handout below    4). Avoid/reduce calorie-containing beverages    5). Consume 64 oz fluid/day (increase on active days)   - track fluid intake, carry water bottle, reminders to stay hydrated    6). Increase activity as able    The Plate Method  http://[email or web address]    Protein Sources   http://[employer] Biolog[employer]/005940.[email or web address]     Carbohydrates  [email or web address]     Mindful Eating  http://Green Biologics/937333.[email or web address]     Summary of [employer] Eating Plan  [email or web address]     Healthy Snack Ideas:   - 1/2 c cottage cheese 3/4 cup fruit (berries, peaches)  - sliced apple with cheedar cheese slice or 1T peanut butter  - snap peas/carrots/whole grain crackers with hummus  - serving fruit (bananas, kiwi, apple, oranges, grapes)  - hard boiled eggs  - 2 cup popcorn (plain)  - 15 almonds, walnuts, cashews  - carrot/celery sticks and 2 tbsp low-fat ranch  - Part-skim mozzarella cheese stick  - Low-fat, low-sugar greek yogurt with 1/2 cup berries  - sliced bell peppers with 1/2 cup salsa  - sliced cucumbers with vinegar    Healthy Recipe Resources:  \"The Volumetrics Eating Plan\" by Kerry Gonzales, Ph.D.  https://www.[employer] Identity[employer].com/  www.Dis[employer] By [employer].com  www.[employer]vedere.org  Dash " "Diet Recipes [facility]  www.extension.[facility] - the recipe box  \"Cooking that Counts\" by editors of CookingLight  https://www.[facility]/communityculinary/[facility]_Cookbook_KT_Final_11-6_NoCrops-compressed.pdf  Https://www.choosemyplate.gov/myplatekitchen  https://snaped.[employer].usda.gov/recipes-menus - SNAP recipes  https://www.diabetesfoodhub.org/all-recipes.html  https://www.[employer].WhatsOpen/        Interested in working with a health ?  Health coaches work with you to improve your overall health and wellbeing.  They look at the whole person, and may involve discussion of different areas of life, including, but not limited to the four pillars of health (sleep, exercise, nutrition, and stress management). Discuss with your care team if you would like to start working a health .    Health Coaching-3 Pack:    $99 for three health coaching visits    Visits may be done in person or via phone    Coaching is a partnership between the  and the client; Coaches do not prescribe or diagnose    Coaching helps inspire the client to reach his/her personal goals      Virtual Support Groups are Available             Healthy Lifestyle Support Group      All are welcome!     Facilitator: Kimmie Suero, Certified Health     - Meets once a month on a Friday from 12:30pm to 1:30pm.  - Due to Covid-19 she is doing this Support Group virtually using Microsoft Teams.  - 60 minutes of small group guided discussion, support and resources.  - Please email Kimmie directly at [email or web address] to receive monthly invitations.  - If you opt to participate in individual health coaching sessions or for general questions, contact Kimmie via email.  - Kimmie will send out invites for each session, so the phone number and the conference ID may change for each session.           "

## 2021-08-11 ENCOUNTER — TELEPHONE (OUTPATIENT)
Dept: ENDOCRINOLOGY | Facility: CLINIC | Age: 46
End: 2021-08-11

## 2021-08-11 NOTE — TELEPHONE ENCOUNTER
LVM to schedule 4 week follow up appt with sindi nur. Left call center phone number and sent [employer].

## 2021-09-02 ENCOUNTER — OFFICE VISIT (OUTPATIENT)
Dept: URGENT CARE | Facility: URGENT CARE | Age: 46
End: 2021-09-02
Payer: COMMERCIAL

## 2021-09-02 VITALS
SYSTOLIC BLOOD PRESSURE: 122 MMHG | DIASTOLIC BLOOD PRESSURE: 74 MMHG | HEART RATE: 85 BPM | BODY MASS INDEX: 40.18 KG/M2 | OXYGEN SATURATION: 100 % | RESPIRATION RATE: 16 BRPM | WEIGHT: 250 LBS | TEMPERATURE: 98.5 F | HEIGHT: 66 IN

## 2021-09-02 DIAGNOSIS — S51.859A: Primary | ICD-10-CM
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
## 2021-09-02 DIAGNOSIS — L08.9: Primary | ICD-10-CM

## 2021-09-02 PROCEDURE — 99213 OFFICE O/P EST LOW 20 MIN: CPT | Performed by: PHYSICIAN ASSISTANT

## 2021-09-02 RX ORDER — FLUCONAZOLE 150 MG/1
150 TABLET ORAL ONCE
Qty: 1 TABLET | Refills: 0 | Status: SHIPPED | OUTPATIENT
Start: 2021-09-02 | End: 2021-09-02

## 2021-09-02 ASSESSMENT — MIFFLIN-ST. JEOR: SCORE: 1780.74

## 2021-09-03 NOTE — PROGRESS NOTES
"Assessment & Plan     1. Infected dog bite of forearm, initial encounter  Augmentin as ordered. Warm compresses, elevation, close observation. If rapidly worsening should reassess.    Pt agreeable.  I would try to remove the tissue glue present with a petrolum based ointment at home. It is a thin layer but would be best to allow it to drain.  Monitor dog isolated from other dogs x 10 days.        Marguerite Rodriguez PA-C  [facility] [place]    Jewel Del Rosario is a 46 year old female who presents to clinic today for the following health issues:  Chief Complaint   Patient presents with     Urgent Care     Dog Bite     dog bite on left forearm this morning.      HPI  Pt was bit by her own dog this am on the L forearm when trying to administer eye drops.  It is a house dog up to date on immunizations.  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 One of the spots was weepy so she put some tissue glue on it to stop the oozing.  Today through the day has become more red and swollon.  No fevers noted. UTD on tetanus immunization.      Review of Systems  Constitutional, HEENT, cardiovascular, pulmonary, gi and gu systems are negative, except as otherwise noted.      Objective    /74   Pulse 85   Temp 98.5  F (36.9  C) (Oral)   Resp 16   Ht 1.676 m (5' 6\")   Wt 113.4 kg (250 lb)   LMP 08/26/2021   SpO2 100%   Breastfeeding No   BMI 40.35 kg/m    Physical Exam   Exam of the L volar forearm reveals an area of erythema approximately 10 cm x 8 cm, TTP, there is a proximal puncture wound with small amount of tissue glue on it and a small linear abrasion just distal.  No palpable nodule or flucuant mass.          "

## 2021-09-03 NOTE — PATIENT INSTRUCTIONS
Patient Education     Dog Bite  A dog bite can cause a wound deep enough to break the skin. In such cases, the wound is cleaned and sometimes closed. Wounds would be closed if they are gaping open or in cosmetically important areas such as the face. If the wound is closed, it is usually not completely closed. This is so that fluid can drain if the wound becomes infected. Often, wounds will be left open to heal. In addition to wound care, a tetanus shot (injection) may be given, if needed.     Home care    Wash your hands well with soap and warm water before and after caring for the wound. This helps lower the risk of infection.    Care for the wound as directed. If a dressing was applied to the wound, be sure to change it as directed.    If the wound bleeds, place a clean, soft cloth on the wound. Then firmly apply pressure until the bleeding stops. This may take up to 5 minutes. Don't release the pressure and look at the wound during this time.    Most wounds heal within 10 days. But an infection can occur even with correct treatment. So be sure to check the wound daily for signs of infection (see below).    Antibiotics may be prescribed. These help prevent or treat infection. If you re given antibiotics, take them as directed. Also be sure to complete the medicines.  Rabies prevention  Rabies is a virus that can be carried in certain animals. These can include domestic animals such as dogs and cats. Pets fully vaccinated against rabies (2 shots) are at very low risk of infection. But because human rabies is almost always fatal, any biting pet should be confined for 10 days as an extra precaution. In general, if there is a risk for rabies, the following steps may need to be taken:     If someone s pet dog has bitten you, it should be kept in a secure area for the next 10 days to watch for signs of illness. (If the pet owner won t allow this, contact your local animal control center.) Ask to see the pet's  vaccination history records. If the dog becomes ill or dies during that time, contact your local animal control center at once so the animal may be tested for rabies. If the dog stays healthy for the next 10 days, there is no danger of rabies in the animal or you.  ? If a stray dog bit you, contact your local animal control center. They can give information on capture, quarantine, and animal rabies testing.  ? If you can t find the animal that bit you in the next 2 days, and if rabies exists in your area, you may need to receive the rabies vaccine series. Call your healthcare provider right away. Or return to the emergency department promptly.  Follow-up care  Follow up with your healthcare provider, or as directed.  When to get medical advice  Call your healthcare provider or get medical attention right away if any of these occur:     Signs of infection:  ? Spreading redness or warmth from the wound  ? Increased pain or swelling  ? Fever of 100.4 F (38 C) or higher, or as directed by your healthcare provider  ? Colored fluid or pus draining from the wound    Signs of rabies infection. Don't wait for any of these symptoms to occur! If you suspect that the dog that bit you is rabid, or if the dog is lost and can't be found, you should get the vaccine series.  ? Headache  ? Confusion  ? Strange behavior  ? Increased salivating and drooling  ? Seizure  ? Hallucination, anxiety, or agitation  ? Fever    Decreased ability to move any body part near the wound    Bleeding that can't be stopped after 5 minutes of firm pressure  Selene last reviewed this educational content on 8/1/2019 2000-2020 [employer]. [address] [place] [address], [place] [address]. All rights reserved. This information is not intended as a substitute for professional medical care. Always follow your healthcare professional's instructions.  This information has been modified by your health care provider with permission from the  publisher.

## 2021-09-13 ENCOUNTER — MYC MEDICAL ADVICE (OUTPATIENT)
Dept: PEDIATRICS | Facility: CLINIC | Age: 46
End: 2021-09-13

## 2021-09-13 DIAGNOSIS — B37.31 YEAST INFECTION OF THE VAGINA: Primary | ICD-10-CM

## 2021-09-13 RX ORDER — FLUCONAZOLE 150 MG/1
150 TABLET ORAL ONCE
Qty: 2 TABLET | Refills: 0 | Status: SHIPPED | OUTPATIENT
Start: 2021-09-13 | End: 2021-09-13

## 2021-09-13 NOTE — TELEPHONE ENCOUNTER
Please see patient request. Recently treated with amoxicillin-clavulanate (AUGMENTIN) 875-125 MG tablet for infected dog bit now reporting yeast infection like symptoms from antibiotic use.    Requesting Rx for diflucan - pt is traveling out of state, pharmacy pended.

## 2021-09-29 DIAGNOSIS — E66.813 CLASS 3 SEVERE OBESITY DUE TO EXCESS CALORIES WITH SERIOUS COMORBIDITY AND BODY MASS INDEX (BMI) OF 40.0 TO 44.9 IN ADULT (H): ICD-10-CM

## 2021-09-29 DIAGNOSIS — E66.01 CLASS 3 SEVERE OBESITY DUE TO EXCESS CALORIES WITH SERIOUS COMORBIDITY AND BODY MASS INDEX (BMI) OF 40.0 TO 44.9 IN ADULT (H): ICD-10-CM

## 2021-09-30 DIAGNOSIS — E66.813 CLASS 3 SEVERE OBESITY DUE TO EXCESS CALORIES WITH SERIOUS COMORBIDITY AND BODY MASS INDEX (BMI) OF 40.0 TO 44.9 IN ADULT (H): ICD-10-CM

## 2021-09-30 DIAGNOSIS — E66.01 CLASS 3 SEVERE OBESITY DUE TO EXCESS CALORIES WITH SERIOUS COMORBIDITY AND BODY MASS INDEX (BMI) OF 40.0 TO 44.9 IN ADULT (H): ICD-10-CM

## 2021-09-30 NOTE — TELEPHONE ENCOUNTER
[employer] message sent to patient to schedule appointment with Lisa Andre CNP. Patient saw MTM last month and was increased in Wegovy  1mg and told to follow up in 1 month.

## 2021-09-30 NOTE — TELEPHONE ENCOUNTER
Semaglutide-Weight Management 1 MG/0.5ML SOAJ  Last Written Prescription Date:  8/9/2021  Last Fill Quantity: 2,   # refills: 0  Last Office Visit : 6/23/2021  Future Office visit:  None    Routing refill request to provider for review/approval because:  Pt asked to follow up in 2 months.    No follow up visit noted.  Refer to Provider for review and refills.       Krystle Hurley RN  Central Triage Red Flags/Med Refills    MEDICATIONS: 6/23/2021       - Start taking Wegovy (semeglutide 2.4mg) taper up        - Continue other medications without change  CONSULTATION/REFERRAL to [facility] pharmacist in 3-4 weeks   FUTURE LABS:       - Schedule a non-fasting blood draw   FUTURE APPOINTMENTS:       - Follow-up visit in 2 months with me   Goals  - read about and consider 24 week healthy lifestyle plan- does it fit now?   - food journal x 2-3 days prior to seeing dietitian- when and what you are eating- hungry? Emotion?   -weekly weights

## 2021-09-30 NOTE — TELEPHONE ENCOUNTER
Patient has appointment scheduled. Sending refill at current dose.   
Reason for call:  Medication   If this is a refill request, has the caller requested the refill from the pharmacy already? Yes  Will the patient be using a [facility]? Yes  Name of the pharmacy and phone number for the current request: [facility], [phone]    Name of the medication requested: Ashley    Other request: pt has appt scheduled 11/18    Phone number to reach patient:  Home number on file [phone] (home)    Best Time:      Can we leave a detailed message on this number?  YES    Travel screening: Not Applicable  
Semaglutide-Weight Management 1 MG/0.5ML SOAJ      Last Written Prescription Date:  8/9/21  Last Fill Quantity: 2 ml,   # refills: 0  Last Office Visit : 8/9/21 recommended 1 month follow up  Future Office visit:  11/18/21    Refill was declined earlier today as did not have follow up appointment in place.  Is now scheduled.  Can refill be refilled until appointment date or declined as follow up is not as recommended?  
History of placement of ear tubes

## 2021-10-23 ENCOUNTER — HEALTH MAINTENANCE LETTER (OUTPATIENT)
Age: 46
End: 2021-10-23

## 2021-11-02 DIAGNOSIS — F33.42 RECURRENT MAJOR DEPRESSIVE DISORDER, IN FULL REMISSION (H): ICD-10-CM

## 2021-11-04 RX ORDER — DULOXETIN HYDROCHLORIDE 60 MG/1
CAPSULE, DELAYED RELEASE ORAL
Qty: 90 CAPSULE | Refills: 0 | Status: SHIPPED | OUTPATIENT
Start: 2021-11-04 | End: 2021-12-03

## 2021-11-04 NOTE — TELEPHONE ENCOUNTER
Routing refill request to provider for review/approval because:  Patient needs to be seen because it has been more than 1 year since last office visit.  Failing PHQ9    Miroslava Frazier RN

## 2021-11-04 NOTE — TELEPHONE ENCOUNTER
Please call and schedule wellness in next few months and do phq9.     SUREKHA Bang MD  Internal Medicine-Pediatrics

## 2021-11-08 NOTE — TELEPHONE ENCOUNTER
Called patient and LVM to return phone call in regards to scheduling, also sent [employer] message to patient with PHQ-9.     Maritza Manriquez CMA

## 2021-11-15 DIAGNOSIS — E89.0 HYPOTHYROIDISM, POSTSURGICAL: ICD-10-CM

## 2021-11-16 RX ORDER — LEVOTHYROXINE SODIUM 137 UG/1
137 TABLET ORAL DAILY
Qty: 90 TABLET | Refills: 0 | Status: SHIPPED | OUTPATIENT
Start: 2021-11-16 | End: 2021-12-03

## 2021-11-16 NOTE — TELEPHONE ENCOUNTER
Patient scheduled physical with Dr. Bang for January. Closing encounter at this time.     Maritza Manriquez, CMA

## 2021-11-16 NOTE — TELEPHONE ENCOUNTER
Medication is being filled for 1 time refill only due to:  Patient needs labs TSH. Patient needs to be seen because it has been more than one year since last visit.     The Pt is already scheduled. RN will issue 90 day sorin.     Nellie Barrios, ERENDIRA   [facility]  -- Triage Nurse

## 2021-11-17 NOTE — PROGRESS NOTES
During this virtual visit the patient is located in [place], patient verifies this as the location during the entirety of this visit.     Carin is a 46 year old who is being evaluated via a billable video visit.      How would you like to obtain your AVS? MyChart  If the video visit is dropped, the invitation should be resent by: Text to cell phone:  [phone]  Will anyone else be joining your video visit? No      Video Start Time: 1416  Video-Visit Details    Type of service:  Video Visit    Video End Time:1427    Originating Location (pt. Location): Home    Distant Location (provider location):  [facility] [place]     Platform used for Video Visit: Sharon Mobley NREMT

## 2021-11-18 ENCOUNTER — VIRTUAL VISIT (OUTPATIENT)
Dept: ENDOCRINOLOGY | Facility: CLINIC | Age: 46
End: 2021-11-18
Payer: COMMERCIAL

## 2021-11-18 VITALS — WEIGHT: 258 LBS | HEIGHT: 66 IN | BODY MASS INDEX: 41.46 KG/M2

## 2021-11-18 DIAGNOSIS — E66.813 CLASS 3 SEVERE OBESITY DUE TO EXCESS CALORIES WITH SERIOUS COMORBIDITY AND BODY MASS INDEX (BMI) OF 40.0 TO 44.9 IN ADULT (H): Primary | ICD-10-CM

## 2021-11-18 DIAGNOSIS — E66.01 CLASS 3 SEVERE OBESITY DUE TO EXCESS CALORIES WITH SERIOUS COMORBIDITY AND BODY MASS INDEX (BMI) OF 40.0 TO 44.9 IN ADULT (H): Primary | ICD-10-CM

## 2021-11-18 PROCEDURE — 99212 OFFICE O/P EST SF 10 MIN: CPT | Mod: 95 | Performed by: NURSE PRACTITIONER

## 2021-11-18 RX ORDER — SEMAGLUTIDE 1 MG/.5ML
1 INJECTION, SOLUTION SUBCUTANEOUS
Qty: 2 ML | Refills: 3 | Status: SHIPPED | OUTPATIENT
Start: 2021-11-18 | End: 2022-12-09

## 2021-11-18 ASSESSMENT — MIFFLIN-ST. JEOR: SCORE: 1817.03

## 2021-11-18 NOTE — ASSESSMENT & PLAN NOTE
Down 25lb since starting Wegovy 6/2021. Improved satiety, decreased hunger, cravings for fresh foods. Constipation has resolved, some burping and diarrhea with starting the 1mg dose. Would like to stay at this dose for now. Was at a plateau when switching to 1mg but is now losing again.     Lots of stress in life right now. Difficulty finding time for meal prep but cooking food at home when time allows. Increasing activity is limited by foot pain which she is going to be seen for soon.     Follow up in 3 months

## 2021-11-18 NOTE — PROGRESS NOTES
18 minutes spent on the date of the encounter doing chart review, history and exam, documentation and further activities per the note    Return Medical Weight Management Note     Kaleigh Nam  MRN:  [id number]  :  1975  ELIAS:  2021    Dear Dusty Bang MD,    I had the pleasure of seeing your patient Kaleigh Nam. She is a 46 year old female who I am continuing to see for treatment of obesity related to:       2021   I have the following health issues associated with obesity: Pre-Diabetes, GERD (Reflux), Stress Incontinence, Osteoarthritis (joint disease), Hypothyroidism   I have the following symptoms associated with obesity: Knee Pain, Depression, Lower Extremity Swelling, Back Pain, Fatigue, Groin Rash, Hip Pain       Assessment & Plan   Problem List Items Addressed This Visit        Digestive    Class 3 severe obesity due to excess calories with serious comorbidity and body mass index (BMI) of 40.0 to 44.9 in adult (H) - Primary     Down 25lb since starting Wegovy 2021. Improved satiety, decreased hunger, cravings for fresh foods. Constipation has resolved, some burping and diarrhea with starting the 1mg dose. Would like to stay at this dose for now. Was at a plateau when switching to 1mg but is now losing again.     Lots of stress in life right now. Difficulty finding time for meal prep but cooking food at home when time allows. Increasing activity is limited by foot pain which she is going to be seen for soon.     Follow up in 3 months                 INTERVAL HISTORY:  New [facility] 2021- started Wegovy. Lauren Bloch [facility] Pharmacist 2021 increased Wegovy to 1mg. Discussed increased hydration along with miralax for constipation.     Today:   wegovy helping with smaller portions, improved satiety, craving more fruits and veggies. With increase to 1mg had some sulfur burping and diarrhea initially but these have resolved. Did it a plateau and then started losing  again      Has been trying to cook more when time is available     Constipation- resolved   Starting weight was actually 283   Foot is limiting activity level     CURRENT WEIGHT:   258 lbs 0 oz    Initial Weight (lbs): 183 lbs (Correct)  Last Visits Weight: 124.7 kg (275 lb)  Cumulative weight loss (lbs): -75  Weight Loss Percentage: -40.98%    Changes and Difficulties 11/17/2021   I have made the following changes to my diet since my last visit: Smaller portions   With regards to my diet, I am still struggling with: Time to prepare healthy foods   I have made the following changes to my activity/exercise since my last visit: Made an appointment for left foot pain . Hope to Start walking more.   With regards to my activity/exercise, I am still struggling with: Pain         MEDICATIONS:   Current Outpatient Medications   Medication Sig Dispense Refill     DULoxetine (CYMBALTA) 60 MG capsule TAKE ONE CAPSULE BY MOUTH ONCE DAILY 90 capsule 0     ferrous sulfate (FEROSUL) 325 (65 Fe) MG tablet Take 1 tablet (325 mg) by mouth daily (with breakfast) 90 tablet 3     levothyroxine (SYNTHROID/LEVOTHROID) 137 MCG tablet Take 1 tablet (137 mcg) by mouth daily 90 tablet 0     loratadine (CLARITIN) 10 MG tablet Take 10 mg by mouth daily as needed.       Semaglutide-Weight Management 1 MG/0.5ML SOAJ Inject 1 mg Subcutaneous once a week 2 mL 1       Weight Loss Medication History Reviewed With Patient 11/17/2021   Which weight loss medications are you currently taking on a regular basis?  Ozempic   Are you having any side effects from the weight loss medication that we have prescribed you? Yes   If you are having side effects please describe: Sometimes constipation and sometime diarrhea.  Manageable       Orders Only on 06/28/2021   Component Date Value Ref Range Status     Hemoglobin A1C 06/28/2021 5.3  0 - 5.6 % Final    Comment: Normal <5.7% Prediabetes 5.7-6.4%  Diabetes 6.5% or higher - adopted from ADA   consensus  "guidelines.       Sodium 06/28/2021 139  133 - 144 mmol/L Final     Potassium 06/28/2021 3.9  3.4 - 5.3 mmol/L Final     Chloride 06/28/2021 107  94 - 109 mmol/L Final     Carbon Dioxide 06/28/2021 28  20 - 32 mmol/L Final     Anion Gap 06/28/2021 5  3 - 14 mmol/L Final     Glucose 06/28/2021 88  70 - 99 mg/dL Final    Fasting specimen     Urea Nitrogen 06/28/2021 20  7 - 30 mg/dL Final     Creatinine 06/28/2021 0.75  0.52 - 1.04 mg/dL Final     GFR Estimate 06/28/2021 >90  >60 mL/min/[1.73_m2] Final    Comment: Non  GFR Calc  Starting 12/18/2018, serum creatinine based estimated GFR (eGFR) will be   calculated using the Chronic Kidney Disease Epidemiology Collaboration   (CKD-EPI) equation.       GFR Estimate If Black 06/28/2021 >90  >60 mL/min/[1.73_m2] Final    Comment:  GFR Calc  Starting 12/18/2018, serum creatinine based estimated GFR (eGFR) will be   calculated using the Chronic Kidney Disease Epidemiology Collaboration   (CKD-EPI) equation.       Calcium 06/28/2021 8.3* 8.5 - 10.1 mg/dL Final       PHYSICAL EXAM:  Objective    Ht 1.676 m (5' 6\")   Wt 117 kg (258 lb)   BMI 41.64 kg/m             Vitals:  No vitals were obtained today due to virtual visit.    GENERAL: Healthy, alert and no distress  EYES: Eyes grossly normal to inspection.  No discharge or erythema, or obvious scleral/conjunctival abnormalities.  RESP: No audible wheeze, cough, or visible cyanosis.  No visible retractions or increased work of breathing.    SKIN: Visible skin clear. No significant rash, abnormal pigmentation or lesions.  NEURO: Cranial nerves grossly intact.  Mentation and speech appropriate for age.  PSYCH: Mentation appears normal, affect normal/bright, judgement and insight intact, normal speech and appearance well-groomed.        Sincerely,    Lisa Andre NP  "

## 2021-11-18 NOTE — PATIENT INSTRUCTIONS
"Thank you for allowing us the privilege of caring for you. We hope we provided you with the excellent service you deserve.   Please let us know if there is anything else we can do for you so that we can be sure you are completely satisfied with your care experience.    To ensure the quality of our services you may be receiving a patient satisfaction survey from an independent patient satisfaction monitoring company.    The greatest compliment you can give is a \"Likely to Recommend\"    Your visit was with Lisa Andre NP today.    Instructions per today's visit:     Dimitry Nam, it was great to visit with you today.  Here is a review of our visit.  If our clinic scheduler is not able to reach you please call [phone] to schedule your next appointments.    -continue wegovy 1mg   -continue to work on health lifestyle changes   -follow up 3 months       Information about Video Visits with [facility]: video visit information  _________________________________________________________________________________________________________________________________________________________  Important contact and scheduling information:  Please call our contact center at [phone] to schedule your next appointments.  To find a lab location near you, please call [phone].  For any nursing questions or concerns call Denita Gutiérrez LPN at [phone] or Jadyn Rodgers RN at [phone]  Please call during clinic hours Monday through Friday 8:00a - 4:00p if you have questions or you can contact us via [employer] at anytime and we will reply during clinic hours.    Lab results will be communicated through My Chart or letter (if My Chart not used). Please call the clinic if you have not received communication after 1 week or if you have any questions.?  Clinic Fax: " [phone]  _________________________________________________________________________________________________________________________________________________________  Meal Replacement Products:    Here is the link to our new e-store where you can purchase our meal replacement products     [employer] E-Store  [employer]/store    The one week starter kit is a great way to sample a variety of products and see what works for you.    If you want more information about the product go to: Fresh Steps Meals  [employer].Swo[employer] Editions    If you are an employee or [facility] or  [employer] please contact your care team for a 10% estore discount    Free Shipping for orders over $75     Benefits of meal replacements products:    Portion and calorie control  Improved nutrition  Structured eating  Simplified food choices  Avoid contact with trigger foods  _________________________________________________________________________________________________________________________________________________________  Interested in working with a health ?  Health coaches work with you to improve your overall health and wellbeing.  They look at the whole person, and may involve discussion of different areas of life, including, but not limited to the four pillars of health (sleep, exercise, nutrition, and stress management). Discuss with your care team if you would like to start working a health .  Health Coaching-3 Pack: Schedule by calling [phone]    $99 for three health coaching visits    Visits may be done in person or via phone    Coaching is a partnership between the  and the client; Coaches do not prescribe or diagnose    Coaching helps inspire the client to reach his/her personal goals   _________________________________________________________________________________________________________________________________________________________  24 Week Healthy Lifestyle Plan:    Our  mission in the 24-week Healthy Lifestyle Plan is to provide you with individualized care by giving you the tools, education and support you need to lose weight and maintain a healthy lifestyle. In your 24-week journey, you ll be supported by a dedicated weight loss team that includes registered dietitians, medical weight management providers, health coaches, and nurses -- all with special expertise in weight loss -- to help you every step of the way.     Monthly meetings with your registered dietician or medical weight management provider help to review your progress, update your care plan, and make any adjustments needed to ensure success. Between these visits, weekly and bi-weekly health  visits will help you focus on the four pillars of weight loss -- stress, sleep, nutrition, and exercise -- and how you can best adapt each to achieve sustainable weight loss results.    In addition, you will be given exclusive access to online wellbeing classes through [employer].  Your initial visit will be with a medical weight management provider who will help to understand your weight loss goals and ensure this program is the right fit for you. Please let our team know if you are interested in the 24 week plan by sending a message to your care team or calling [phone] to schedule.  _________________________________________________________________________________________________________________________________________________________    COMPREHENSIVE WEIGHT MANAGEMENT PROGRAM  VIRTUAL SUPPORT GROUPS    For Support Group Information:      We offer support groups for patients who are working on weight loss and considering, preparing for or have had weight loss surgery.   There is no cost for this opportunity.  You are invited to attend the?Virtual Support Groups?provided by any of the following locations:    1. [employer] via [employer] Teams with Debi Bonilla RN  2.   [employer] via [employer] Teams with Jesus Hahn, PhD,  "  3.   [place] via [employer] Teams with Kimmie Monk RN  4.   [facility] via [employer] Teams with AKOSUA [facility]    The following Support Group information can also be found on our website:  [email or web address]      [facility] Weight Loss Surgery Support Group    [facility] Weight Loss Surgery Support Group  The support group is a patient-lead forum that meets monthly to share experiences, encouragement and education. It is open to those who have had weight loss surgery, are scheduled for surgery, and those who are considering surgery.   WHEN: This group meets on the 3rd Wednesday of each month from 5:00PM - 6:00PM virtually using Microsoft Teams.   FACILITATOR: Led by Debi Bonilla, the program's Clinical Coordinator.   TO REGISTER: Please contact the clinic via Lastline or call the nurse line directly at [phone] to inform our staff that you would like an invite sent to you and to let us know the email you would like the invite sent to. Prior to the meeting, a link with directions on how to join the meeting will be sent to you.    2021 Meetings  August 18: \"Let's Talk\" a time for the group to share.  September 15: \"Let's Talk\" a time for the group to share.  October 20: \"Let's Talk\" a time for the group to share.  November 17: April Dean RD, SANJAY \"Protein, Metabolism and Meal Planning\"  December 15: Lewis Bhatt RD, LD will speak, \"Recipe Modification\"    [facility] Support Groups    Connections: Bariatric Care Support Group?  This is open to all [facility] (and those external to this program) pre- and post- operative bariatric surgery patients as well as their support system.   WHEN: This group meets the 2nd Tuesday of each month from 6:30 PM - 8:00 PM virtually using Microsoft Teams.   FACILITATOR: Led by Jesus Hahn, Ph.D who is a Licensed " "Psychologist with the [facility] Comprehensive Weight Management Program.   TO REGISTER: Please send an email to Jesus Hahn, Ph.D., LP [email or web address]?if you would like an invitation to the group and to learn about using Microsoft Teams.    2021 Meetings  August 10: Open Forum  September 14: Guest Speaker: Kimmie Monk RN,CBN, CIC, [facility] Comprehensive Weight Management Program, \"Your Hospital Stay and Post-Operative Compliance\"  October 12: Open Forum  November 9: Guest Speaker: Noemy Devine RD,LD, [facility] Weight Management Program,\"Holiday Eating\".  December 14: Guest Speaker Tiff Lauren MD, MPH, [facility], [facility], \"Body Contouring Surgery for Bariatric Surgery Patients\"     Connections: Post-Operative Bariatric Surgery Support Group  This is a support group for [facility] bariatric patients (and those external to [facility]) who have had bariatric surgery and are at least 3 months post-surgery.  WHEN: This support group meets the 4th Wednesday of the month from 11:00 AM - 12:00 PM virtually using Microsoft Teams.   FACILITATOR: Led by Certified Bariatric Nurse, Kimmie Monk RN.   TO REGISTER: Please send an email to Kimmie at [email or web address] if you would like an invitation to the group and to learn about using [employer]      [facility] Healthy Lifestyle Virtual Support Group    Healthy Lifestyle Virtual Support Group?  This is 60 minutes of small group guided discussion, support and resources. All are welcome who want a healthy lifestyle.  WHEN: This group meets monthly on a Friday from 12:30 PM - to 1:30 PM virtually using Microsoft Teams.   FACILITATOR: Led by National Board Certified Health , Kimmie Suero, [facility].   TO REGISTER: Please send an email to Kimmie [email or web address] to receive monthly invites to the group or if you have any questions " about having a health .  Prior to the meeting, a link with directions on how to join the meeting will be sent to you.    2021 Meetings  August 27: Open Forum  September 24: Sleep and the 7 Types of Rest  October 29: Open Forum  November 19: Gratitude     December 10: Open Forum    2022 Meetings January 21: Healthy Habits  February 25: Open Forum  March 18: Setting limits and Boundaries  April 29: Nutrition  May 20: Open Forum  ____________________________________________________________________________________________________________________________________________________________________________  [facility] Get Moving Program  Our team of physical therapists is trained to help you understand and take control of your condition. They will perform a thorough evaluation to determine your ability for activity and develop a customized plan to fit your goals and physical ability.  Scheduling: Unsure if the Get Moving program is right for you? Discuss the program with your medical provider or diabetes educator. You can also call us at [phone] to ask questions or schedule an appointment.   [facility] Get Moving Program  ____________________________________________________________________________________________________________________________________________________________________________  [facility] Diabetes Prevention Program (DPP)  If you have prediabetes and Medicare please contact us via [employer] to learn more about the Diabetes Prevention Program (DPP)  Program Details:  [facility] offers the year-long Diabetes Prevention Program (DPP). The program helps you to make lifestyle changes that prevent or delay type 2 diabetes by supporting healthy eating, increased physical activity, stress reduction and use of coping skills.   On average, previous [facility] DPP cohorts have lost and maintained at least 5% of their starting weight throughout the program and averaged more than  150 minutes of physical activity per week.  Participants meet weekly for one-hour group sessions over sixteen weeks, every other week for the next 8 weeks, and monthly for the last six months.   A year-long maintenance program is also available for participants who complete the first year.   Location & Cost:   During the COVID-19 Public Health Emergency, the program is offered virtually. When in-person classes can resume, they will be held at [facility].  For people with Medicare, the program is covered in full. A self-pay option will also be available for those with non-Medicare insurance plans.   _________________________________________________________________________________________________________________________________________________________  Bluetooth Scale:    We hope to provide you with high quality virtual healthcare visits while social distancing for COVID-19 is necessary, as well as in the future when virtual visits may be more convenient for you.     Our technology team made it possible for Bluetooth scales to send weight measurements to our electronic medical record. This allows weights from you weighing at home to securely flow into the medical record, which will improve telephone and virtual visits.   Additionally, studies have shown that adults actually lose more weight when their weights are automatically sent to someone else, and also that this process is not stressful for those adults.    Below is a link for purchasing the scale, with a discount code for our patients. You may call your insurance company to see if they will reimburse you for the cost of the scale, as a piece of durable medical equipment. The scales only go up to a weight of 400 pounds. This is an issue and we are working with the developer on increasing this. We found no scales that go over 400lb that have blue-tooth for connecting to MyChart.    Scale to purchase: the [employer]  Body  Scale:  https://[email or web address]/us/en/body/shop?gclid=[email or web address]&gclsrc=aw.ds    Discount Code: We have a discount code for our patients to bring the cost down to $50, Discount code is: [place]_Scale_20%off      Thank you,   [facility] Comprehensive Weight Management Team

## 2021-11-18 NOTE — LETTER
2021       RE: Kaleigh Nam  [address] [address]     Dear Colleague,    Thank you for referring your patient, Kaleigh Nam, to the [facility] [place] at [facility]. Please see a copy of my visit note below.    During this virtual visit the patient is located in [place], patient verifies this as the location during the entirety of this visit.     Carin is a 46 year old who is being evaluated via a billable video visit.      How would you like to obtain your AVS? MyChart  If the video visit is dropped, the invitation should be resent by: Text to cell phone:  [phone]  Will anyone else be joining your video visit? No      Video Start Time:   Video-Visit Details    Type of service:  Video Visit    Video End Time:    Originating Location (pt. Location): Home    Distant Location (provider location):  [facility] [place]     Platform used for Video Visit: Sharon Mobley NREMT          18 minutes spent on the date of the encounter doing chart review, history and exam, documentation and further activities per the note    Return Medical Weight Management Note     Kaleigh Nam  MRN:  [id number]  :  1975  ELIAS:  2021    Dear Dusty Bang MD,    I had the pleasure of seeing your patient Kaleigh Nam. She is a 46 year old female who I am continuing to see for treatment of obesity related to:       2021   I have the following health issues associated with obesity: Pre-Diabetes, GERD (Reflux), Stress Incontinence, Osteoarthritis (joint disease), Hypothyroidism   I have the following symptoms associated with obesity: Knee Pain, Depression, Lower Extremity Swelling, Back Pain, Fatigue, Groin Rash, Hip Pain       Assessment & Plan   Problem List Items Addressed This Visit        Digestive    Class 3 severe  obesity due to excess calories with serious comorbidity and body mass index (BMI) of 40.0 to 44.9 in adult (H) - Primary     Down 25lb since starting Wegovy 6/2021. Improved satiety, decreased hunger, cravings for fresh foods. Constipation has resolved, some burping and diarrhea with starting the 1mg dose. Would like to stay at this dose for now. Was at a plateau when switching to 1mg but is now losing again.     Lots of stress in life right now. Difficulty finding time for meal prep but cooking food at home when time allows. Increasing activity is limited by foot pain which she is going to be seen for soon.     Follow up in 3 months                 INTERVAL HISTORY:  New MWM 6/23/2021- started Wegovy. Lauren Bloch MTM Pharmacist 8/9/2021 increased Wegovy to 1mg. Discussed increased hydration along with miralax for constipation.     Today:   wegovy helping with smaller portions, improved satiety, craving more fruits and veggies. With increase to 1mg had some sulfur burping and diarrhea initially but these have resolved. Did it a plateau and then started losing again      Has been trying to cook more when time is available     Constipation- resolved   Starting weight was actually 283   Foot is limiting activity level     CURRENT WEIGHT:   258 lbs 0 oz    Initial Weight (lbs): 183 lbs (Correct)  Last Visits Weight: 124.7 kg (275 lb)  Cumulative weight loss (lbs): -75  Weight Loss Percentage: -40.98%    Changes and Difficulties 11/17/2021   I have made the following changes to my diet since my last visit: Smaller portions   With regards to my diet, I am still struggling with: Time to prepare healthy foods   I have made the following changes to my activity/exercise since my last visit: Made an appointment for left foot pain . Hope to Start walking more.   With regards to my activity/exercise, I am still struggling with: Pain       MEDICATIONS:   Current Outpatient Medications   Medication Sig Dispense Refill      DULoxetine (CYMBALTA) 60 MG capsule TAKE ONE CAPSULE BY MOUTH ONCE DAILY 90 capsule 0     ferrous sulfate (FEROSUL) 325 (65 Fe) MG tablet Take 1 tablet (325 mg) by mouth daily (with breakfast) 90 tablet 3     levothyroxine (SYNTHROID/LEVOTHROID) 137 MCG tablet Take 1 tablet (137 mcg) by mouth daily 90 tablet 0     loratadine (CLARITIN) 10 MG tablet Take 10 mg by mouth daily as needed.       Semaglutide-Weight Management 1 MG/0.5ML SOAJ Inject 1 mg Subcutaneous once a week 2 mL 1       Weight Loss Medication History Reviewed With Patient 11/17/2021   Which weight loss medications are you currently taking on a regular basis?  Ozempic   Are you having any side effects from the weight loss medication that we have prescribed you? Yes   If you are having side effects please describe: Sometimes constipation and sometime diarrhea.  Manageable       Orders Only on 06/28/2021   Component Date Value Ref Range Status     Hemoglobin A1C 06/28/2021 5.3  0 - 5.6 % Final    Comment: Normal <5.7% Prediabetes 5.7-6.4%  Diabetes 6.5% or higher - adopted from ADA   consensus guidelines.       Sodium 06/28/2021 139  133 - 144 mmol/L Final     Potassium 06/28/2021 3.9  3.4 - 5.3 mmol/L Final     Chloride 06/28/2021 107  94 - 109 mmol/L Final     Carbon Dioxide 06/28/2021 28  20 - 32 mmol/L Final     Anion Gap 06/28/2021 5  3 - 14 mmol/L Final     Glucose 06/28/2021 88  70 - 99 mg/dL Final    Fasting specimen     Urea Nitrogen 06/28/2021 20  7 - 30 mg/dL Final     Creatinine 06/28/2021 0.75  0.52 - 1.04 mg/dL Final     GFR Estimate 06/28/2021 >90  >60 mL/min/[1.73_m2] Final    Comment: Non  GFR Calc  Starting 12/18/2018, serum creatinine based estimated GFR (eGFR) will be   calculated using the Chronic Kidney Disease Epidemiology Collaboration   (CKD-EPI) equation.       GFR Estimate If Black 06/28/2021 >90  >60 mL/min/[1.73_m2] Final    Comment:  GFR Calc  Starting 12/18/2018, serum creatinine based  "estimated GFR (eGFR) will be   calculated using the Chronic Kidney Disease Epidemiology Collaboration   (CKD-EPI) equation.       Calcium 06/28/2021 8.3* 8.5 - 10.1 mg/dL Final       PHYSICAL EXAM:  Objective    Ht 1.676 m (5' 6\")   Wt 117 kg (258 lb)   BMI 41.64 kg/m      Vitals:  No vitals were obtained today due to virtual visit.    GENERAL: Healthy, alert and no distress  EYES: Eyes grossly normal to inspection.  No discharge or erythema, or obvious scleral/conjunctival abnormalities.  RESP: No audible wheeze, cough, or visible cyanosis.  No visible retractions or increased work of breathing.    SKIN: Visible skin clear. No significant rash, abnormal pigmentation or lesions.  NEURO: Cranial nerves grossly intact.  Mentation and speech appropriate for age.  PSYCH: Mentation appears normal, affect normal/bright, judgement and insight intact, normal speech and appearance well-groomed.      Lisa Andre NP  "

## 2021-11-19 ENCOUNTER — TELEPHONE (OUTPATIENT)
Dept: ENDOCRINOLOGY | Facility: CLINIC | Age: 46
End: 2021-11-19
Payer: COMMERCIAL

## 2021-11-19 NOTE — TELEPHONE ENCOUNTER
pham to schedule 3 month follow up with BASILIA PELLETIER, left call center phone number and sent [employer].

## 2021-11-30 ENCOUNTER — TRANSFERRED RECORDS (OUTPATIENT)
Dept: HEALTH INFORMATION MANAGEMENT | Facility: CLINIC | Age: 46
End: 2021-11-30
Payer: COMMERCIAL

## 2021-12-02 SDOH — ECONOMIC STABILITY: INCOME INSECURITY: HOW HARD IS IT FOR YOU TO PAY FOR THE VERY BASICS LIKE FOOD, HOUSING, MEDICAL CARE, AND HEATING?: NOT HARD AT ALL

## 2021-12-02 SDOH — ECONOMIC STABILITY: FOOD INSECURITY: WITHIN THE PAST 12 MONTHS, YOU WORRIED THAT YOUR FOOD WOULD RUN OUT BEFORE YOU GOT MONEY TO BUY MORE.: NEVER TRUE

## 2021-12-02 SDOH — ECONOMIC STABILITY: INCOME INSECURITY: IN THE LAST 12 MONTHS, WAS THERE A TIME WHEN YOU WERE NOT ABLE TO PAY THE MORTGAGE OR RENT ON TIME?: NO

## 2021-12-02 SDOH — ECONOMIC STABILITY: TRANSPORTATION INSECURITY
IN THE PAST 12 MONTHS, HAS THE LACK OF TRANSPORTATION KEPT YOU FROM MEDICAL APPOINTMENTS OR FROM GETTING MEDICATIONS?: NO

## 2021-12-02 SDOH — HEALTH STABILITY: PHYSICAL HEALTH: ON AVERAGE, HOW MANY MINUTES DO YOU ENGAGE IN EXERCISE AT THIS LEVEL?: 0 MIN

## 2021-12-02 SDOH — ECONOMIC STABILITY: FOOD INSECURITY: WITHIN THE PAST 12 MONTHS, THE FOOD YOU BOUGHT JUST DIDN'T LAST AND YOU DIDN'T HAVE MONEY TO GET MORE.: NEVER TRUE

## 2021-12-02 SDOH — HEALTH STABILITY: PHYSICAL HEALTH: ON AVERAGE, HOW MANY DAYS PER WEEK DO YOU ENGAGE IN MODERATE TO STRENUOUS EXERCISE (LIKE A BRISK WALK)?: 0 DAYS

## 2021-12-02 SDOH — ECONOMIC STABILITY: TRANSPORTATION INSECURITY
IN THE PAST 12 MONTHS, HAS LACK OF TRANSPORTATION KEPT YOU FROM MEETINGS, WORK, OR FROM GETTING THINGS NEEDED FOR DAILY LIVING?: NO

## 2021-12-02 ASSESSMENT — ENCOUNTER SYMPTOMS
COUGH: 0
DYSURIA: 0
HEARTBURN: 0
NERVOUS/ANXIOUS: 0
DIZZINESS: 0
NAUSEA: 0
FEVER: 0
ABDOMINAL PAIN: 0
PARESTHESIAS: 1
FREQUENCY: 0
PALPITATIONS: 0
EYE PAIN: 0
SHORTNESS OF BREATH: 0
HEADACHES: 0
MYALGIAS: 0
HEMATURIA: 0
WEAKNESS: 0
HEMATOCHEZIA: 0
JOINT SWELLING: 0
BREAST MASS: 0
CONSTIPATION: 0
DIARRHEA: 0
CHILLS: 0
ARTHRALGIAS: 0
SORE THROAT: 0

## 2021-12-02 ASSESSMENT — LIFESTYLE VARIABLES
HOW OFTEN DO YOU HAVE A DRINK CONTAINING ALCOHOL: MONTHLY OR LESS
HOW MANY STANDARD DRINKS CONTAINING ALCOHOL DO YOU HAVE ON A TYPICAL DAY: 1 OR 2
HOW OFTEN DO YOU HAVE SIX OR MORE DRINKS ON ONE OCCASION: NEVER

## 2021-12-02 ASSESSMENT — SOCIAL DETERMINANTS OF HEALTH (SDOH)
DO YOU BELONG TO ANY CLUBS OR ORGANIZATIONS SUCH AS CHURCH GROUPS UNIONS, FRATERNAL OR ATHLETIC GROUPS, OR SCHOOL GROUPS?: NO
HOW OFTEN DO YOU GET TOGETHER WITH FRIENDS OR RELATIVES?: ONCE A WEEK
HOW OFTEN DO YOU ATTEND CHURCH OR RELIGIOUS SERVICES?: NEVER
IN A TYPICAL WEEK, HOW MANY TIMES DO YOU TALK ON THE PHONE WITH FAMILY, FRIENDS, OR NEIGHBORS?: ONCE A WEEK

## 2021-12-03 ENCOUNTER — OFFICE VISIT (OUTPATIENT)
Dept: PEDIATRICS | Facility: CLINIC | Age: 46
End: 2021-12-03
Payer: COMMERCIAL

## 2021-12-03 VITALS
SYSTOLIC BLOOD PRESSURE: 122 MMHG | HEART RATE: 87 BPM | RESPIRATION RATE: 14 BRPM | HEIGHT: 66 IN | OXYGEN SATURATION: 97 % | TEMPERATURE: 98.5 F | BODY MASS INDEX: 41.82 KG/M2 | DIASTOLIC BLOOD PRESSURE: 64 MMHG | WEIGHT: 260.2 LBS

## 2021-12-03 DIAGNOSIS — E66.813 CLASS 3 SEVERE OBESITY DUE TO EXCESS CALORIES WITH SERIOUS COMORBIDITY AND BODY MASS INDEX (BMI) OF 40.0 TO 44.9 IN ADULT (H): ICD-10-CM

## 2021-12-03 DIAGNOSIS — E89.0 HYPOTHYROIDISM, POSTSURGICAL: ICD-10-CM

## 2021-12-03 DIAGNOSIS — L98.9 SKIN LESION: ICD-10-CM

## 2021-12-03 DIAGNOSIS — Z00.00 ROUTINE GENERAL MEDICAL EXAMINATION AT A HEALTH CARE FACILITY: Primary | ICD-10-CM

## 2021-12-03 DIAGNOSIS — Z23 HIGH PRIORITY FOR 2019-NCOV VACCINE: ICD-10-CM

## 2021-12-03 DIAGNOSIS — F33.42 RECURRENT MAJOR DEPRESSIVE DISORDER, IN FULL REMISSION (H): ICD-10-CM

## 2021-12-03 DIAGNOSIS — Z11.59 NEED FOR HEPATITIS C SCREENING TEST: ICD-10-CM

## 2021-12-03 DIAGNOSIS — Z12.11 SCREEN FOR COLON CANCER: ICD-10-CM

## 2021-12-03 DIAGNOSIS — G25.81 RESTLESS LEGS SYNDROME: ICD-10-CM

## 2021-12-03 DIAGNOSIS — Z12.31 ENCOUNTER FOR SCREENING MAMMOGRAM FOR BREAST CANCER: ICD-10-CM

## 2021-12-03 DIAGNOSIS — E66.01 CLASS 3 SEVERE OBESITY DUE TO EXCESS CALORIES WITH SERIOUS COMORBIDITY AND BODY MASS INDEX (BMI) OF 40.0 TO 44.9 IN ADULT (H): ICD-10-CM

## 2021-12-03 PROCEDURE — 86803 HEPATITIS C AB TEST: CPT | Performed by: INTERNAL MEDICINE

## 2021-12-03 PROCEDURE — 91300 COVID-19,PF,PFIZER (12+ YRS): CPT | Performed by: INTERNAL MEDICINE

## 2021-12-03 PROCEDURE — 82728 ASSAY OF FERRITIN: CPT | Performed by: INTERNAL MEDICINE

## 2021-12-03 PROCEDURE — 0004A COVID-19,PF,PFIZER (12+ YRS): CPT | Performed by: INTERNAL MEDICINE

## 2021-12-03 PROCEDURE — 36415 COLL VENOUS BLD VENIPUNCTURE: CPT | Performed by: INTERNAL MEDICINE

## 2021-12-03 PROCEDURE — 99214 OFFICE O/P EST MOD 30 MIN: CPT | Mod: 25 | Performed by: INTERNAL MEDICINE

## 2021-12-03 PROCEDURE — 99396 PREV VISIT EST AGE 40-64: CPT | Mod: 25 | Performed by: INTERNAL MEDICINE

## 2021-12-03 PROCEDURE — 84443 ASSAY THYROID STIM HORMONE: CPT | Performed by: INTERNAL MEDICINE

## 2021-12-03 RX ORDER — DULOXETIN HYDROCHLORIDE 60 MG/1
CAPSULE, DELAYED RELEASE ORAL
Qty: 90 CAPSULE | Refills: 3 | Status: SHIPPED | OUTPATIENT
Start: 2021-12-03 | End: 2022-12-09

## 2021-12-03 RX ORDER — FLUCONAZOLE 150 MG/1
TABLET ORAL
COMMUNITY
Start: 2021-09-13 | End: 2022-06-30

## 2021-12-03 RX ORDER — LEVOTHYROXINE SODIUM 137 UG/1
137 TABLET ORAL DAILY
Qty: 90 TABLET | Refills: 3 | Status: SHIPPED | OUTPATIENT
Start: 2021-12-03 | End: 2022-12-09

## 2021-12-03 ASSESSMENT — ENCOUNTER SYMPTOMS
DIARRHEA: 0
JOINT SWELLING: 0
FEVER: 0
WEAKNESS: 0
DIZZINESS: 0
ARTHRALGIAS: 0
DYSURIA: 0
HEARTBURN: 0
HEADACHES: 0
FREQUENCY: 0
CHILLS: 0
ABDOMINAL PAIN: 0
EYE PAIN: 0
SORE THROAT: 0
COUGH: 0
NERVOUS/ANXIOUS: 0
BREAST MASS: 0
HEMATOCHEZIA: 0
CONSTIPATION: 0
PALPITATIONS: 0
NAUSEA: 0
SHORTNESS OF BREATH: 0
PARESTHESIAS: 1
MYALGIAS: 0
HEMATURIA: 0

## 2021-12-03 ASSESSMENT — MIFFLIN-ST. JEOR: SCORE: 1827.01

## 2021-12-03 NOTE — PROGRESS NOTES
SUBJECTIVE:   CC: Kaleigh Nam is an 46 year old woman who presents for preventive health visit.   Patient has been advised of split billing requirements and indicates understanding: Yes     Healthy Habits:     Getting at least 3 servings of Calcium per day:  Yes    Bi-annual eye exam:  NO    Dental care twice a year:  Yes    Sleep apnea or symptoms of sleep apnea:  None    Diet:  Regular (no restrictions)    Frequency of exercise:  None    Taking medications regularly:  Yes    Medication side effects:  None    PHQ-2 Total Score: 0    Additional concerns today:  No    # Social   - got  in March  - teens dealing with MH    # Mental health  PHQ 12/16/2019 10/2/2020 11/17/2021   PHQ-9 Total Score 4 4 4   Q9: Thoughts of better off dead/self-harm past 2 weeks Not at all Not at all Not at all   F/U: Thoughts of suicide or self-harm - - -   F/U: Safety concerns - - -     SAMUEL-7 SCORE 12/13/2018 10/2/2020 11/17/2021   Total Score - - -   Total Score - - 2 (minimal anxiety)   Total Score 3 4 2     cymbalta 60    # Hypothyroidism  TSH   Date Value Ref Range Status   10/02/2020 2.38 0.40 - 4.00 mU/L Final     - synthroid    # Weight   Wt Readings from Last 4 Encounters:   12/03/21 118 kg (260 lb 3.2 oz)   11/18/21 117 kg (258 lb)   09/02/21 113.4 kg (250 lb)   06/23/21 124.7 kg (275 lb)   - wegovy     Today's PHQ-2 Score:   PHQ-2 ( 1999 Pfizer) 12/2/2021   Q1: Little interest or pleasure in doing things 0   Q2: Feeling down, depressed or hopeless 0   PHQ-2 Score 0   PHQ-2 Total Score (12-17 Years)- Positive if 3 or more points; Administer PHQ-A if positive -   Q1: Little interest or pleasure in doing things Not at all   Q2: Feeling down, depressed or hopeless Not at all   PHQ-2 Score 0     Abuse: Current or Past (Physical, Sexual or Emotional) - No  Do you feel safe in your environment? Yes    Have you ever done Advance Care Planning? (For example, a Health Directive, POLST, or a discussion with a medical  provider or your loved ones about your wishes): No, advance care planning information given to patient to review.  Patient plans to discuss their wishes with loved ones or provider.      Social History     Tobacco Use     Smoking status: Never Smoker     Smokeless tobacco: Never Used   Substance Use Topics     Alcohol use: Yes     Alcohol/week: 3.0 standard drinks     Types: 3 Standard drinks or equivalent per week     Comment: 1 month     Alcohol Use 12/2/2021   Prescreen: >3 drinks/day or >7 drinks/week? No   Prescreen: >3 drinks/day or >7 drinks/week? -     Reviewed orders with patient.  Reviewed health maintenance and updated orders accordingly - Yes    Breast Cancer Screening:    Breast CA Risk Assessment (FHS-7) 12/2/2021   Do you have a family history of breast, colon, or ovarian cancer? No / Unknown     Mammogram Screening: Recommended annual mammography  Pertinent mammograms are reviewed under the imaging tab.    History of abnormal Pap smear: NO - age 30-65 PAP every 5 years with negative HPV co-testing recommended  PAP / HPV Latest Ref Rng & Units 6/22/2017 7/24/2014 12/4/2007   PAP (Historical) - NIL NIL NIL   HPV16 NEG Negative - -   HPV18 NEG Negative - -   HRHPV NEG Negative - -     Reviewed and updated as needed this visit by clinical staff  Tobacco  Allergies  Meds   Med Hx  Surg Hx  Fam Hx  Soc Hx       Reviewed and updated as needed this visit by Provider                 Review of Systems   Constitutional: Negative for chills and fever.   HENT: Negative for congestion, ear pain, hearing loss and sore throat.    Eyes: Negative for pain and visual disturbance.   Respiratory: Negative for cough and shortness of breath.    Cardiovascular: Negative for chest pain, palpitations and peripheral edema.   Gastrointestinal: Negative for abdominal pain, constipation, diarrhea, heartburn, hematochezia and nausea.   Breasts:  Negative for tenderness, breast mass and discharge.   Genitourinary: Negative  "for dysuria, frequency, genital sores, hematuria, pelvic pain, urgency, vaginal bleeding and vaginal discharge.   Musculoskeletal: Negative for arthralgias, joint swelling and myalgias.   Skin: Negative for rash.   Neurological: Positive for paresthesias. Negative for dizziness, weakness and headaches.   Psychiatric/Behavioral: Negative for mood changes. The patient is not nervous/anxious.        OBJECTIVE:   /64 (BP Location: Right arm, Patient Position: Sitting, Cuff Size: Adult Large)   Pulse 87   Temp 98.5  F (36.9  C) (Tympanic)   Resp 14   Ht 1.676 m (5' 6\")   Wt 118 kg (260 lb 3.2 oz)   SpO2 97%   BMI 42.00 kg/m    Physical Exam  GENERAL: healthy, alert and no distress  EYES: Eyes grossly normal to inspection, PERRL and conjunctivae and sclerae normal  HENT: ear canals and TM's normal, nose and mouth without ulcers or lesions  NECK: no adenopathy, no asymmetry, masses, or scars and thyroid normal to palpation  RESP: lungs clear to auscultation - no rales, rhonchi or wheezes  CV: regular rate and rhythm, normal S1 S2, no S3 or S4, no murmur, click or rub, no peripheral edema and peripheral pulses strong  ABDOMEN: soft, nontender, no hepatosplenomegaly, no masses and bowel sounds normal  MS: no gross musculoskeletal defects noted, no edema  SKIN: no suspicious lesions or rashes  NEURO: Normal strength and tone, mentation intact and speech normal  PSYCH: mentation appears normal, affect normal/bright    Diagnostic Test Results:  Labs reviewed in Epic    ASSESSMENT/PLAN:   (Z00.00) Routine general medical examination at a health care facility  (primary encounter diagnosis)  - pap next year  - covid booster today  - colon cancer screening ordered  - mammo scheduled    (E66.01,  Z68.41) Class 3 severe obesity due to excess calories with serious comorbidity and body mass index (BMI) of 40.0 to 44.9 in adult (H)  Wt Readings from Last 4 Encounters:   12/03/21 118 kg (260 lb 3.2 oz)   11/18/21 117 kg (258 " "lb)   09/02/21 113.4 kg (250 lb)   06/23/21 124.7 kg (275 lb)   Doing well on Wegovy  Recent A1c wnl    (E89.0) Hypothyroidism, postsurgical  TSH   Date Value Ref Range Status   10/02/2020 2.38 0.40 - 4.00 mU/L Final     Plan: TSH WITH FREE T4 REFLEX, levothyroxine         (SYNTHROID/LEVOTHROID) 137 MCG tablet    (F33.42) Recurrent major depressive disorder, in full remission (H)  PHQ 12/16/2019 10/2/2020 11/17/2021   PHQ-9 Total Score 4 4 4   Q9: Thoughts of better off dead/self-harm past 2 weeks Not at all Not at all Not at all   F/U: Thoughts of suicide or self-harm - - -   F/U: Safety concerns - - -     SAMUEL-7 SCORE 12/13/2018 10/2/2020 11/17/2021   Total Score - - -   Total Score - - 2 (minimal anxiety)   Total Score 3 4 2     Doing well.   Plan: DULoxetine (CYMBALTA) 60 MG capsule    (G25.81) Restless legs syndrome  Better with oral iron every other day. Will monitor ferritin level. Symptoms are decent so probably would hold off on iron infusion if low.  Plan: Ferritin          (L98.9) Skin lesion  Plan: Adult Dermatology Referral    (Z23) High priority for 2019-nCoV vaccine    (Z12.11) Screen for colon cancer  Plan: COLOGUARD([employer])    (Z11.59) Need for hepatitis C screening test  Plan: Hepatitis C Screen Reflex to HCV RNA Quant and         Genotype    (Z12.31) Encounter for screening mammogram for breast cancer  Plan: MA SCREENING DIGITAL BILAT - Future  (s+30)    Patient has been advised of split billing requirements and indicates understanding: No  COUNSELING:  Reviewed preventive health counseling, as reflected in patient instructions    Estimated body mass index is 42 kg/m  as calculated from the following:    Height as of this encounter: 1.676 m (5' 6\").    Weight as of this encounter: 118 kg (260 lb 3.2 oz).    Weight management plan: Patient referred to endocrine and/or weight management specialty    She reports that she has never smoked. She has never used smokeless tobacco.    Counseling " Resources:  ATP IV Guidelines  Pooled Cohorts Equation Calculator  Breast Cancer Risk Calculator  BRCA-Related Cancer Risk Assessment: FHS-7 Tool  FRAX Risk Assessment  ICSI Preventive Guidelines  Dietary Guidelines for Americans, 2010  USDA's MyPlate  ASA Prophylaxis  Lung CA Screening    Dusty Bang MD  [facility]

## 2021-12-03 NOTE — PATIENT INSTRUCTIONS
Good to see you!    Check w/ insurance about the cologuard - new recs are to start colon cancer screening at 45.       Preventive Health Recommendations  Female Ages 40 to 49    Yearly exam:     See your health care provider every year in order to  1. Review health changes.   2. Discuss preventive care.    3. Review your medicines if your doctor prescribed any.      Get a Pap test every three years (unless you have an abnormal result and your provider advises testing more often).      If you get Pap tests with HPV test, you only need to test every 5 years, unless you have an abnormal result. You do not need a Pap test if your uterus was removed (hysterectomy) and you have not had cancer.      You should be tested each year for STDs (sexually transmitted diseases), if you're at risk.     Ask your doctor if you should have a mammogram.      Have a colonoscopy (test for colon cancer) if someone in your family has had colon cancer or polyps before age 50.       Have a cholesterol test every 5 years.       Have a diabetes test (fasting glucose) after age 45. If you are at risk for diabetes, you should have this test every 3 years.    Shots: Get a flu shot each year. Get a tetanus shot every 10 years.     Nutrition:     Eat at least 5 servings of fruits and vegetables each day.    Eat whole-grain bread, whole-wheat pasta and brown rice instead of white grains and rice.    Get adequate Calcium and Vitamin D.      Lifestyle    Exercise at least 150 minutes a week (an average of 30 minutes a day, 5 days a week). This will help you control your weight and prevent disease.    Limit alcohol to one drink per day.    No smoking.     Wear sunscreen to prevent skin cancer.    See your dentist every six months for an exam and cleaning.

## 2021-12-04 LAB
FERRITIN SERPL-MCNC: 17 NG/ML (ref 8–252)
HCV AB SERPL QL IA: NONREACTIVE
TSH SERPL DL<=0.005 MIU/L-ACNC: 0.77 MU/L (ref 0.4–4)

## 2022-01-11 ENCOUNTER — ANCILLARY PROCEDURE (OUTPATIENT)
Dept: MAMMOGRAPHY | Facility: CLINIC | Age: 47
End: 2022-01-11
Attending: INTERNAL MEDICINE
Payer: COMMERCIAL

## 2022-01-11 DIAGNOSIS — Z12.31 ENCOUNTER FOR SCREENING MAMMOGRAM FOR BREAST CANCER: ICD-10-CM

## 2022-01-11 PROCEDURE — 77063 BREAST TOMOSYNTHESIS BI: CPT | Mod: TC | Performed by: RADIOLOGY

## 2022-01-11 PROCEDURE — 77067 SCR MAMMO BI INCL CAD: CPT | Mod: TC | Performed by: RADIOLOGY

## 2022-03-22 ENCOUNTER — TRANSFERRED RECORDS (OUTPATIENT)
Dept: HEALTH INFORMATION MANAGEMENT | Facility: CLINIC | Age: 47
End: 2022-03-22
Payer: COMMERCIAL

## 2022-03-29 ENCOUNTER — OFFICE VISIT (OUTPATIENT)
Dept: DERMATOLOGY | Facility: CLINIC | Age: 47
End: 2022-03-29
Payer: COMMERCIAL

## 2022-03-29 VITALS — HEART RATE: 96 BPM | DIASTOLIC BLOOD PRESSURE: 74 MMHG | OXYGEN SATURATION: 96 % | SYSTOLIC BLOOD PRESSURE: 111 MMHG

## 2022-03-29 DIAGNOSIS — D22.9 NEVUS: ICD-10-CM

## 2022-03-29 DIAGNOSIS — D18.01 ANGIOMA OF SKIN: ICD-10-CM

## 2022-03-29 DIAGNOSIS — L81.4 LENTIGO: ICD-10-CM

## 2022-03-29 DIAGNOSIS — L24.9 IRRITANT DERMATITIS: ICD-10-CM

## 2022-03-29 DIAGNOSIS — L82.1 SEBORRHEIC KERATOSIS: ICD-10-CM

## 2022-03-29 DIAGNOSIS — L73.2 HIDRADENITIS SUPPURATIVA: Primary | ICD-10-CM

## 2022-03-29 PROCEDURE — 99204 OFFICE O/P NEW MOD 45 MIN: CPT | Performed by: PHYSICIAN ASSISTANT

## 2022-03-29 RX ORDER — TRIAMCINOLONE ACETONIDE 1 MG/G
CREAM TOPICAL
Qty: 80 G | Refills: 2 | Status: SHIPPED | OUTPATIENT
Start: 2022-03-29 | End: 2022-12-09

## 2022-03-29 RX ORDER — CLINDAMYCIN PHOSPHATE 10 MG/G
GEL TOPICAL
Qty: 60 G | Refills: 11 | Status: SHIPPED | OUTPATIENT
Start: 2022-03-29 | End: 2023-04-12

## 2022-03-29 NOTE — PROGRESS NOTES
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   ROS of the following were done and are negative: Constitutional, Eyes, Ears, Nose,   Mouth, Throat, Cardiovascular, Respiratory, GI, Genitourinary, Musculoskeletal,   Psychiatric, Endocrine, Allergic/Immunologic.    PHYSICAL EXAM:   /74   Pulse 96   SpO2 96%   Skin exam performed as follows: Type 2 skin. Mood appropriate  Alert and Oriented X 3. Well developed, well nourished in no distress.  General appearance: Normal  Head including face: Normal  Eyes: conjunctiva and lids: Normal  Mouth: Lips, teeth, gums: Normal  Neck: Normal  Chest-breast/axillae: Normal  Back: Normal  Spleen and liver: Normal  Cardiovascular: Exam of peripheral vascular system by observation for swelling, varicosities, edema: Normal  Genitalia: groin, buttocks: Normal  Extremities: digits/nails (clubbing): Normal  Eccrine and Apocrine glands: Normal  Right upper extremity: Normal  Left upper extremity: Normal  Right lower extremity: Normal  Left lower extremity: Normal  Skin: Scalp and body hair: See below    Pt deferred exam of breasts, groin, buttocks: No    Other physical findings:  1. Multiple pigmented macules on extremities and trunk  2. Multiple pigmented macules on face, trunk and extremities  3. Multiple vascular papules on trunk, arms and legs  4. Multiple scattered keratotic plaques  5. Dermatitis on bilateral forearms,  hands  6. Inflammatory papules beneath breasts, cysts in the groin       Except as noted above, no other signs of skin cancer or melanoma.     ASSESSMENT/PLAN:   Benign Full skin cancer screening today. . Patient with history of none  Advised on monthly self exams and 1 year  Patient Education: Appropriate brochures given.    1. Multiple benign appearing melanocytic nevi on arms, legs and trunk. Discussed ABCDEs of melanoma and sunscreen.   2. Multiple lentigos on arms, legs and trunk. Advised benign, no treatment needed.  3. Multiple scattered angiomas. Advised benign, no treatment needed.   4. Seborrheic keratosis on arms, legs and trunk. Advised benign, no treatment needed.  5. Irritant dermatitis - Start TAC cream BID x 1-2 weeks PRN; thick emollients throughout the day  6. Mild hidradenitis suppurativa  --Start OTC Hibiclens or BPO wash daily in the shower  --Start clindamycin gel every day after the shower  --Can call for abx if flaring              Follow-up: yearly/PRN sooner    1.) Patient was asked about new and changing moles. YES  2.) Patient received a complete physical skin examination: YES  3.) Patient was counseled to perform a monthly self skin examination: YES  Scribed By: Lidia Cortes MS, PAJUN

## 2022-03-29 NOTE — LETTER
3/29/2022         RE: Kaleigh Nam  [address] [facility] [id number]        Dear Colleague,    Thank you for referring your patient, Kaleigh Nam, to the [facility]. Please see a copy of my visit note below.    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   ROS of the following were done and are negative: Constitutional, Eyes, Ears, Nose,   Mouth, Throat, Cardiovascular, Respiratory, GI, Genitourinary, Musculoskeletal,   Psychiatric, Endocrine, Allergic/Immunologic.    PHYSICAL EXAM:   /74   Pulse 96   SpO2 96%   Skin exam performed as follows: Type 2 skin. Mood appropriate  Alert and Oriented X 3. Well developed, well nourished in no distress.  General appearance: Normal  Head including face: Normal  Eyes: conjunctiva and lids: Normal  Mouth: Lips, teeth, gums: Normal  Neck: Normal  Chest-breast/axillae: Normal  Back: Normal  Spleen and liver: Normal  Cardiovascular: Exam of peripheral vascular system by observation for swelling, varicosities, edema: Normal  Genitalia: groin, buttocks: Normal  Extremities: digits/nails (clubbing): Normal  Eccrine and Apocrine glands: Normal  Right upper extremity: Normal  Left upper extremity: Normal  Right lower extremity: Normal  Left lower extremity: Normal  Skin: Scalp and body hair: See below    Pt deferred exam of breasts, groin, buttocks:  No    Other physical findings:  1. Multiple pigmented macules on extremities and trunk  2. Multiple pigmented macules on face, trunk and extremities  3. Multiple vascular papules on trunk, arms and legs  4. Multiple scattered keratotic plaques  5. Dermatitis on bilateral forearms, hands  6. Inflammatory papules beneath breasts, cysts in the groin       Except as noted above, no other signs of skin cancer or melanoma.     ASSESSMENT/PLAN:   Benign Full skin cancer screening today. . Patient with history of none  Advised on monthly self exams and 1 year  Patient Education: Appropriate brochures given.    1. Multiple benign appearing melanocytic nevi on arms, legs and trunk. Discussed ABCDEs of melanoma and sunscreen.   2. Multiple lentigos on arms, legs and trunk. Advised benign, no treatment needed.  3. Multiple scattered angiomas. Advised benign, no treatment needed.   4. Seborrheic keratosis on arms, legs and trunk. Advised benign, no treatment needed.  5. Irritant dermatitis - Start TAC cream BID x 1-2 weeks PRN; thick emollients throughout the day  6. Mild hidradenitis suppurativa  --Start OTC Hibiclens or BPO wash daily in the shower  --Start clindamycin gel every day after the shower  --Can call for abx if flaring              Follow-up: yearly/PRN sooner    1.) Patient was asked about new and changing moles. YES  2.) Patient received a complete physical skin examination: YES  3.) Patient was counseled to perform a monthly self skin examination: YES  Scribed By: Lidia Cortes MS, PAJUN          Again, thank you for allowing me to participate in the care of your patient.        Sincerely,        Lidia Cortes PA-C

## 2022-03-29 NOTE — PATIENT INSTRUCTIONS
For the hands/forearms, start triamcinolone cream twice per day for 1-2 weeks at a time when needed.     Moisturizers as much as possible throughout the day    For the groin and beneath the breasts, this is mild hidradenitis suppurativa    Start OTC Hibiclens or a benzoyl peroxide wash daily in the shower  After the shower apply clindamycin gel to your cyst/pimple-prone areas    If you are really flaring,you can call me for an antibiotic

## 2022-04-11 ENCOUNTER — E-VISIT (OUTPATIENT)
Dept: PEDIATRICS | Facility: CLINIC | Age: 47
End: 2022-04-11
Payer: COMMERCIAL

## 2022-04-11 DIAGNOSIS — F51.04 PSYCHOPHYSIOLOGICAL INSOMNIA: ICD-10-CM

## 2022-04-11 DIAGNOSIS — F43.21 GRIEF REACTION: Primary | ICD-10-CM

## 2022-04-11 PROCEDURE — 99421 OL DIG E/M SVC 5-10 MIN: CPT | Performed by: INTERNAL MEDICINE

## 2022-04-11 RX ORDER — TRAZODONE HYDROCHLORIDE 100 MG/1
50-100 TABLET ORAL
Qty: 90 TABLET | Refills: 0 | Status: SHIPPED | OUTPATIENT
Start: 2022-04-11 | End: 2022-06-30

## 2022-04-11 ASSESSMENT — ANXIETY QUESTIONNAIRES
4. TROUBLE RELAXING: MORE THAN HALF THE DAYS
2. NOT BEING ABLE TO STOP OR CONTROL WORRYING: MORE THAN HALF THE DAYS
GAD7 TOTAL SCORE: 14
6. BECOMING EASILY ANNOYED OR IRRITABLE: NOT AT ALL
1. FEELING NERVOUS, ANXIOUS, OR ON EDGE: NEARLY EVERY DAY
8. IF YOU CHECKED OFF ANY PROBLEMS, HOW DIFFICULT HAVE THESE MADE IT FOR YOU TO DO YOUR WORK, TAKE CARE OF THINGS AT HOME, OR GET ALONG WITH OTHER PEOPLE?: VERY DIFFICULT
5. BEING SO RESTLESS THAT IT IS HARD TO SIT STILL: MORE THAN HALF THE DAYS
GAD7 TOTAL SCORE: 14
7. FEELING AFRAID AS IF SOMETHING AWFUL MIGHT HAPPEN: NEARLY EVERY DAY
7. FEELING AFRAID AS IF SOMETHING AWFUL MIGHT HAPPEN: NEARLY EVERY DAY
GAD7 TOTAL SCORE: 14
3. WORRYING TOO MUCH ABOUT DIFFERENT THINGS: MORE THAN HALF THE DAYS

## 2022-04-12 ENCOUNTER — TRANSFERRED RECORDS (OUTPATIENT)
Dept: HEALTH INFORMATION MANAGEMENT | Facility: CLINIC | Age: 47
End: 2022-04-12
Payer: COMMERCIAL

## 2022-04-12 ENCOUNTER — TELEPHONE (OUTPATIENT)
Dept: PEDIATRICS | Facility: CLINIC | Age: 47
End: 2022-04-12
Payer: COMMERCIAL

## 2022-04-12 ASSESSMENT — ANXIETY QUESTIONNAIRES: GAD7 TOTAL SCORE: 14

## 2022-04-12 NOTE — TELEPHONE ENCOUNTER
Attempted to reach patient as outreach and follow up from MyChart encounter 4/11/22. Encouraged patient to contact clinic if needing any further assistance, also sent [employer] message at this time.     Andrey WEINBERG RN

## 2022-04-19 ENCOUNTER — DOCUMENTATION ONLY (OUTPATIENT)
Dept: CONSULT | Facility: CLINIC | Age: 47
End: 2022-04-19
Payer: COMMERCIAL

## 2022-04-19 DIAGNOSIS — Z84.89 FAMILY HISTORY OF GENETIC DISEASE: Primary | ICD-10-CM

## 2022-04-21 NOTE — PROGRESS NOTES
Kaleigh will be participating as a family member in exome sequencing for her son Miguel Grider ([id number]).  Please refer to their chart for additional details.     Verbal informed consent was obtained on 4/18/2022 after reviewing the risks, benefits, and limitations of participating in exome sequencing as a family member    Kaleigh consented to receiving secondary findings for themself after reviewing information regarding them.    Tiffanie Gonzalez MS [facility]  Genetic Counselor  Email: [email or web address]  Phone: [phone]  Pager: [phone]

## 2022-05-06 ENCOUNTER — LAB (OUTPATIENT)
Dept: LAB | Facility: CLINIC | Age: 47
End: 2022-05-06
Payer: COMMERCIAL

## 2022-05-06 DIAGNOSIS — Z84.89 FAMILY HISTORY OF GENETIC DISEASE: ICD-10-CM

## 2022-05-06 PROCEDURE — 99000 SPECIMEN HANDLING OFFICE-LAB: CPT

## 2022-05-06 PROCEDURE — 36415 COLL VENOUS BLD VENIPUNCTURE: CPT

## 2022-06-29 ENCOUNTER — E-VISIT (OUTPATIENT)
Dept: PEDIATRICS | Facility: CLINIC | Age: 47
End: 2022-06-29
Payer: COMMERCIAL

## 2022-06-29 ENCOUNTER — MYC MEDICAL ADVICE (OUTPATIENT)
Dept: PEDIATRICS | Facility: CLINIC | Age: 47
End: 2022-06-29

## 2022-06-29 DIAGNOSIS — F51.04 PSYCHOPHYSIOLOGICAL INSOMNIA: ICD-10-CM

## 2022-06-29 PROCEDURE — 99421 OL DIG E/M SVC 5-10 MIN: CPT | Performed by: INTERNAL MEDICINE

## 2022-06-29 ASSESSMENT — PATIENT HEALTH QUESTIONNAIRE - PHQ9
10. IF YOU CHECKED OFF ANY PROBLEMS, HOW DIFFICULT HAVE THESE PROBLEMS MADE IT FOR YOU TO DO YOUR WORK, TAKE CARE OF THINGS AT HOME, OR GET ALONG WITH OTHER PEOPLE: SOMEWHAT DIFFICULT
SUM OF ALL RESPONSES TO PHQ QUESTIONS 1-9: 10
SUM OF ALL RESPONSES TO PHQ QUESTIONS 1-9: 10

## 2022-06-29 ASSESSMENT — ANXIETY QUESTIONNAIRES
GAD7 TOTAL SCORE: 7
4. TROUBLE RELAXING: SEVERAL DAYS
6. BECOMING EASILY ANNOYED OR IRRITABLE: SEVERAL DAYS
1. FEELING NERVOUS, ANXIOUS, OR ON EDGE: MORE THAN HALF THE DAYS
3. WORRYING TOO MUCH ABOUT DIFFERENT THINGS: SEVERAL DAYS
2. NOT BEING ABLE TO STOP OR CONTROL WORRYING: SEVERAL DAYS
GAD7 TOTAL SCORE: 7
7. FEELING AFRAID AS IF SOMETHING AWFUL MIGHT HAPPEN: SEVERAL DAYS
8. IF YOU CHECKED OFF ANY PROBLEMS, HOW DIFFICULT HAVE THESE MADE IT FOR YOU TO DO YOUR WORK, TAKE CARE OF THINGS AT HOME, OR GET ALONG WITH OTHER PEOPLE?: SOMEWHAT DIFFICULT
5. BEING SO RESTLESS THAT IT IS HARD TO SIT STILL: NOT AT ALL
GAD7 TOTAL SCORE: 7
7. FEELING AFRAID AS IF SOMETHING AWFUL MIGHT HAPPEN: SEVERAL DAYS

## 2022-06-30 RX ORDER — TRAZODONE HYDROCHLORIDE 50 MG/1
50-100 TABLET, FILM COATED ORAL
Qty: 90 TABLET | Refills: 0 | Status: SHIPPED | OUTPATIENT
Start: 2022-06-30 | End: 2022-12-09

## 2022-06-30 ASSESSMENT — PATIENT HEALTH QUESTIONNAIRE - PHQ9: SUM OF ALL RESPONSES TO PHQ QUESTIONS 1-9: 10

## 2022-06-30 ASSESSMENT — ANXIETY QUESTIONNAIRES: GAD7 TOTAL SCORE: 7

## 2022-07-06 NOTE — TELEPHONE ENCOUNTER
Per [employer] pt was going to have therapist complete forms. Closing encounter.    EMargarito Bang MD  Internal Medicine-Pediatrics

## 2022-08-24 LAB
Lab: NORMAL
PERFORMING LABORATORY: NORMAL
SPECIMEN STATUS: NORMAL
TEST NAME: NORMAL

## 2022-09-26 ENCOUNTER — E-VISIT (OUTPATIENT)
Dept: URGENT CARE | Facility: CLINIC | Age: 47
End: 2022-09-26
Payer: COMMERCIAL

## 2022-09-26 DIAGNOSIS — B37.31 CANDIDAL VULVOVAGINITIS: Primary | ICD-10-CM

## 2022-09-26 PROCEDURE — 99421 OL DIG E/M SVC 5-10 MIN: CPT | Performed by: FAMILY MEDICINE

## 2022-09-26 RX ORDER — FLUCONAZOLE 150 MG/1
150 TABLET ORAL ONCE
Qty: 1 TABLET | Refills: 0 | Status: SHIPPED | OUTPATIENT
Start: 2022-09-26 | End: 2022-09-26

## 2022-09-26 NOTE — PATIENT INSTRUCTIONS
Thank you for choosing us for your care. I have placed an order for a prescription so that you can start treatment. View your full visit summary for details by clicking on the link below. Your pharmacist will able to address any questions you may have about the medication.     If you re not feeling better within 2-3 days, please schedule an appointment.  You can schedule an appointment right here in [place], or call [phone]  If the visit is for the same symptoms as your eVisit, we ll refund the cost of your eVisit if seen within seven days.      Yeast Infection (Candida Vaginal Infection)    You have a Candida vaginal infection. This is also known as a yeast infection. It's most often caused by a type of yeast (fungus) called Candida. Candida are normally found in the vagina. But if they increase in number, this can lead to infection and cause symptoms.   Symptoms of a yeast infection can include:     Clumpy or thin, white discharge, which may look like cottage cheese    Itching or burning    Burning with urination  Certain factors can make a yeast infection more likely. These can include:     Taking certain medicines, such as antibiotics or birth control pills    Pregnancy    Diabetes    Weak immune system  A yeast infection is most often treated with antifungal medicine. This may be given as a vaginal cream or pills you take by mouth. Treatment may last for about 1 to 7 days. Women with severe or recurrent infections may need longer courses of treatment.   Home care    If you re prescribed medicine, be sure to use it as directed. Finish all of the medicine, even if your symptoms go away. Don t try to treat yourself using over-the-counter products without talking with your provider first. They will let you know if this is a good option for you.    Ask your provider what steps you can take to help reduce your risk of having a yeast infection in the future.    Follow-up care  Follow up with your healthcare  provider, or as directed.   When to seek medical advice  Call your healthcare provider right away if:     You have a fever of 100.4 F (38 C) or higher, or as directed by your provider.    Your symptoms worsen, or they don t go away within a few days of starting treatment.    You have new pain in the lower belly or pelvic region.    You have side effects that bother you or a reaction to the cream or pills you re prescribed.    You or any partners you have sex with have new symptoms, such as a rash, joint pain, or sores.  [employer] last reviewed this educational content on 7/1/2020 2000-2021 [employer]. All rights reserved. This information is not intended as a substitute for professional medical care. Always follow your healthcare professional's instructions.

## 2022-10-01 ENCOUNTER — OFFICE VISIT (OUTPATIENT)
Dept: URGENT CARE | Facility: URGENT CARE | Age: 47
End: 2022-10-01
Payer: COMMERCIAL

## 2022-10-01 VITALS
HEART RATE: 77 BPM | TEMPERATURE: 98.3 F | WEIGHT: 282.2 LBS | OXYGEN SATURATION: 98 % | SYSTOLIC BLOOD PRESSURE: 113 MMHG | BODY MASS INDEX: 45.55 KG/M2 | DIASTOLIC BLOOD PRESSURE: 70 MMHG

## 2022-10-01 DIAGNOSIS — N89.8 VAGINAL DISCHARGE: Primary | ICD-10-CM

## 2022-10-01 LAB
ALBUMIN UR-MCNC: NEGATIVE MG/DL
AMORPH CRY #/AREA URNS HPF: ABNORMAL /HPF
APPEARANCE UR: ABNORMAL
BACTERIA #/AREA URNS HPF: ABNORMAL /HPF
BILIRUB UR QL STRIP: NEGATIVE
CLUE CELLS: NORMAL
COLOR UR AUTO: YELLOW
GLUCOSE UR STRIP-MCNC: NEGATIVE MG/DL
HGB UR QL STRIP: ABNORMAL
KETONES UR STRIP-MCNC: NEGATIVE MG/DL
LEUKOCYTE ESTERASE UR QL STRIP: NEGATIVE
NITRATE UR QL: NEGATIVE
PH UR STRIP: 8.5 [PH] (ref 5–7)
RBC #/AREA URNS AUTO: ABNORMAL /HPF
SP GR UR STRIP: 1.01 (ref 1–1.03)
SQUAMOUS #/AREA URNS AUTO: ABNORMAL /LPF
TRICHOMONAS, WET PREP: NORMAL
UROBILINOGEN UR STRIP-ACNC: 1 E.U./DL
WBC #/AREA URNS AUTO: ABNORMAL /HPF
WBC'S/HIGH POWER FIELD, WET PREP: NORMAL
YEAST, WET PREP: NORMAL

## 2022-10-01 PROCEDURE — 81001 URINALYSIS AUTO W/SCOPE: CPT

## 2022-10-01 PROCEDURE — 87210 SMEAR WET MOUNT SALINE/INK: CPT

## 2022-10-01 PROCEDURE — 99213 OFFICE O/P EST LOW 20 MIN: CPT | Performed by: PHYSICIAN ASSISTANT

## 2022-10-01 RX ORDER — ACETAMINOPHEN 160 MG
TABLET,DISINTEGRATING ORAL
COMMUNITY

## 2022-10-01 NOTE — PROGRESS NOTES
SUBJECTIVE:  Kaleigh Nam is a 47 year old female who comes in with concerns for possible vaginal infection.  Patient states that symptoms began approximately 1 week ago.  She has some vaginal itching, slight burning and a little bit more discharge.  She is premenopausal.  She did do an online visit and was given some Diflucan which has not improved her symptoms.  She states that her symptoms began right after her menstrual cycle.  She does use a diva cup and not sure if just local irritation.  She denies any STD concerns or sores.  She would like testing just to ensure that there is no infection      Past Medical History:   Diagnosis Date     Allergic rhinitis, cause unspecified      Anxiety state, unspecified      Depressive disorder 2003     Graves's Disease      Moderate episode of recurrent major depressive disorder (H) 12/6/2018     Multinodular Goiter      Severe pre-eclampsia, unspecified as to episode of care      Current Outpatient Medications   Medication     Cholecalciferol (VITAMIN D3) 50 MCG (2000 UT) CAPS     clindamycin (CLINDAMAX) 1 % external gel     DULoxetine (CYMBALTA) 60 MG capsule     ferrous sulfate (FEROSUL) 325 (65 Fe) MG tablet     levothyroxine (SYNTHROID/LEVOTHROID) 137 MCG tablet     traZODone (DESYREL) 50 MG tablet     Semaglutide-Weight Management (WEGOVY) 1 MG/0.5ML SOAJ     Semaglutide-Weight Management 1 MG/0.5ML SOAJ     triamcinolone (KENALOG) 0.1 % external cream     No current facility-administered medications for this visit.     Social History     Socioeconomic History     Marital status:      Spouse name: Not on file     Number of children: Not on file     Years of education: Not on file     Highest education level: Not on file   Occupational History     Employer: NONE    Tobacco Use     Smoking status: Never Smoker     Smokeless tobacco: Never Used   Vaping Use     Vaping Use: Never used   Substance and Sexual Activity     Alcohol use: Yes     Alcohol/week: 3.0  standard drinks     Comment: 1 month     Drug use: No     Sexual activity: Yes     Partners: Female     Birth control/protection: None   Other Topics Concern     Parent/sibling w/ CABG, MI or angioplasty before 65F 55M? 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         to wife, have several kids     Social Determinants of Health     Financial Resource Strain: Low Risk      Difficulty of Paying Living Expenses: Not hard at all   Food Insecurity: No Food Insecurity     Worried About Running Out of Food in the Last Year: Never true     Ran Out of Food in the Last Year: Never true   Transportation Needs: No Transportation Needs     Lack of Transportation (Medical): No     Lack of Transportation (Non-Medical): No   Physical Activity: Inactive     Days of Exercise per Week: 0 days     Minutes of Exercise per Session: 0 min   Stress: Stress Concern Present     Feeling of Stress : To some extent   Social Connections: Socially Isolated     Frequency of Communication with Friends and Family: Once a week     Frequency of Social Gatherings with Friends and Family: Once a week     Attends Rastafarian Services: Never     Active Member of Clubs or Organizations: No     Attends Club or Organization Meetings: Not on file     Marital Status:    Intimate Partner Violence: Not on file   Housing Stability: Low Risk      Unable to Pay for Housing in the Last Year: No     Number of Places Lived in the Last Year: 1     Unstable Housing in the Last Year: No     ROS  Negative other than stated above    Exam:  GENERAL APPEARANCE: healthy, alert and no distress  EYES: EOMI,  PERRL  ABDOMEN:  soft, nontender, no HSM or masses and bowel sounds normal  GU_female: Patient declines exam  SKIN: no suspicious lesions or rashes    Results for orders placed or performed in visit on 10/01/22   UA macro with reflex to Microscopic and Culture - Clinc Collect     Status: Abnormal    Specimen: Urine, Clean Catch   Result  Value Ref Range    Color Urine Yellow Colorless, Straw, Light Yellow, Yellow    Appearance Urine Slightly Cloudy (A) Clear    Glucose Urine Negative Negative mg/dL    Bilirubin Urine Negative Negative    Ketones Urine Negative Negative mg/dL    Specific Gravity Urine 1.015 1.003 - 1.035    Blood Urine Small (A) Negative    pH Urine 8.5 (H) 5.0 - 7.0    Protein Albumin Urine Negative Negative mg/dL    Urobilinogen Urine 1.0 0.2, 1.0 E.U./dL    Nitrite Urine Negative Negative    Leukocyte Esterase Urine Negative Negative   Urine Microscopic     Status: Abnormal   Result Value Ref Range    Bacteria Urine Few (A) None Seen /HPF    RBC Urine 0-2 0-2 /HPF /HPF    WBC Urine 0-5 0-5 /HPF /HPF    Squamous Epithelials Urine Few (A) None Seen /LPF    Amorphous Crystals Urine Few (A) None Seen /HPF    Narrative    Urine Culture not indicated   Wet prep - Clinic Collect     Status: Normal    Specimen: Vagina; Swab   Result Value Ref Range    Trichomonas Absent Absent    Yeast Absent Absent    Clue Cells Absent Absent    WBCs/high power field None None     assessment/plan:  (N89.8) Vaginal discharge  (primary encounter diagnosis)  Comment:   Plan: UA macro with reflex to Microscopic and Culture        - Clinc Collect, Wet prep - Clinic Collect,         Urine Microscopic          Patient with approximately 1 week history of vaginal irritation.  She has taken a Diflucan for possible yeast.  Her urine and wet prep is clear with no evidence for UTI or vaginal infection.  Likely localized irritation.  Advised over-the-counter topical creams for symptomatic relief.  She will follow-up with her primary if symptoms worsen or new symptoms develop

## 2022-10-09 ENCOUNTER — HEALTH MAINTENANCE LETTER (OUTPATIENT)
Age: 47
End: 2022-10-09

## 2022-12-09 ENCOUNTER — OFFICE VISIT (OUTPATIENT)
Dept: PEDIATRICS | Facility: CLINIC | Age: 47
End: 2022-12-09
Payer: COMMERCIAL

## 2022-12-09 ENCOUNTER — MYC MEDICAL ADVICE (OUTPATIENT)
Dept: PEDIATRICS | Facility: CLINIC | Age: 47
End: 2022-12-09

## 2022-12-09 VITALS
HEART RATE: 84 BPM | SYSTOLIC BLOOD PRESSURE: 110 MMHG | RESPIRATION RATE: 16 BRPM | HEIGHT: 66 IN | OXYGEN SATURATION: 97 % | DIASTOLIC BLOOD PRESSURE: 72 MMHG | WEIGHT: 284 LBS | TEMPERATURE: 98.4 F | BODY MASS INDEX: 45.64 KG/M2

## 2022-12-09 DIAGNOSIS — E66.01 CLASS 3 SEVERE OBESITY DUE TO EXCESS CALORIES WITH SERIOUS COMORBIDITY AND BODY MASS INDEX (BMI) OF 40.0 TO 44.9 IN ADULT (H): ICD-10-CM

## 2022-12-09 DIAGNOSIS — E89.0 HYPOTHYROIDISM, POSTSURGICAL: ICD-10-CM

## 2022-12-09 DIAGNOSIS — Z12.4 CERVICAL CANCER SCREENING: ICD-10-CM

## 2022-12-09 DIAGNOSIS — Z79.890 HORMONE REPLACEMENT THERAPY: Primary | ICD-10-CM

## 2022-12-09 DIAGNOSIS — E66.813 CLASS 3 SEVERE OBESITY DUE TO EXCESS CALORIES WITH SERIOUS COMORBIDITY AND BODY MASS INDEX (BMI) OF 40.0 TO 44.9 IN ADULT (H): ICD-10-CM

## 2022-12-09 DIAGNOSIS — N95.1 MENOPAUSAL SYNDROME (HOT FLASHES): ICD-10-CM

## 2022-12-09 DIAGNOSIS — Z00.00 ROUTINE GENERAL MEDICAL EXAMINATION AT A HEALTH CARE FACILITY: Primary | ICD-10-CM

## 2022-12-09 DIAGNOSIS — Z12.11 SCREEN FOR COLON CANCER: ICD-10-CM

## 2022-12-09 DIAGNOSIS — F33.42 RECURRENT MAJOR DEPRESSIVE DISORDER, IN FULL REMISSION (H): ICD-10-CM

## 2022-12-09 DIAGNOSIS — Z12.31 ENCOUNTER FOR SCREENING MAMMOGRAM FOR BREAST CANCER: ICD-10-CM

## 2022-12-09 DIAGNOSIS — Z13.1 SCREENING FOR DIABETES MELLITUS: ICD-10-CM

## 2022-12-09 DIAGNOSIS — L24.9 IRRITANT DERMATITIS: ICD-10-CM

## 2022-12-09 DIAGNOSIS — F51.04 PSYCHOPHYSIOLOGICAL INSOMNIA: ICD-10-CM

## 2022-12-09 PROBLEM — F43.10 PTSD (POST-TRAUMATIC STRESS DISORDER): Status: ACTIVE | Noted: 2022-06-29

## 2022-12-09 LAB
ALBUMIN SERPL BCG-MCNC: 4.2 G/DL (ref 3.5–5.2)
ALP SERPL-CCNC: 84 U/L (ref 35–104)
ALT SERPL W P-5'-P-CCNC: 13 U/L (ref 10–35)
ANION GAP SERPL CALCULATED.3IONS-SCNC: 14 MMOL/L (ref 7–15)
AST SERPL W P-5'-P-CCNC: 22 U/L (ref 10–35)
BILIRUB SERPL-MCNC: 0.4 MG/DL
BUN SERPL-MCNC: 17.3 MG/DL (ref 6–20)
CALCIUM SERPL-MCNC: 9 MG/DL (ref 8.6–10)
CHLORIDE SERPL-SCNC: 103 MMOL/L (ref 98–107)
CREAT SERPL-MCNC: 0.74 MG/DL (ref 0.51–0.95)
CYTOLOGY CVX/VAG DOC THIN PREP: NORMAL
DEPRECATED HCO3 PLAS-SCNC: 22 MMOL/L (ref 22–29)
GFR SERPL CREATININE-BSD FRML MDRD: >90 ML/MIN/1.73M2
GLUCOSE SERPL-MCNC: 94 MG/DL (ref 70–99)
HBA1C MFR BLD: 5.4 % (ref 0–5.6)
HPV16+18+45 E6+E7MRNA CVX NAA+PROBE: NEGATIVE
POTASSIUM SERPL-SCNC: 4.4 MMOL/L (ref 3.4–5.3)
PROT SERPL-MCNC: 7.3 G/DL (ref 6.4–8.3)
SODIUM SERPL-SCNC: 139 MMOL/L (ref 136–145)
TSH SERPL DL<=0.005 MIU/L-ACNC: 2.78 UIU/ML (ref 0.3–4.2)

## 2022-12-09 PROCEDURE — G0145 SCR C/V CYTO,THINLAYER,RESCR: HCPCS | Performed by: INTERNAL MEDICINE

## 2022-12-09 PROCEDURE — 99214 OFFICE O/P EST MOD 30 MIN: CPT | Mod: 25 | Performed by: INTERNAL MEDICINE

## 2022-12-09 PROCEDURE — 99396 PREV VISIT EST AGE 40-64: CPT | Performed by: INTERNAL MEDICINE

## 2022-12-09 PROCEDURE — 36415 COLL VENOUS BLD VENIPUNCTURE: CPT | Performed by: INTERNAL MEDICINE

## 2022-12-09 PROCEDURE — 84443 ASSAY THYROID STIM HORMONE: CPT | Performed by: INTERNAL MEDICINE

## 2022-12-09 PROCEDURE — 80053 COMPREHEN METABOLIC PANEL: CPT | Performed by: INTERNAL MEDICINE

## 2022-12-09 PROCEDURE — 83036 HEMOGLOBIN GLYCOSYLATED A1C: CPT | Performed by: INTERNAL MEDICINE

## 2022-12-09 PROCEDURE — 87624 HPV HI-RISK TYP POOLED RSLT: CPT | Performed by: INTERNAL MEDICINE

## 2022-12-09 RX ORDER — TRAZODONE HYDROCHLORIDE 50 MG/1
50-100 TABLET, FILM COATED ORAL
Qty: 90 TABLET | Refills: 3 | Status: SHIPPED | OUTPATIENT
Start: 2022-12-09 | End: 2024-01-09

## 2022-12-09 RX ORDER — PROGESTERONE 100 MG/1
CAPSULE ORAL
Qty: 90 CAPSULE | Refills: 0 | Status: SHIPPED | OUTPATIENT
Start: 2022-12-09 | End: 2023-05-01

## 2022-12-09 RX ORDER — DULOXETIN HYDROCHLORIDE 60 MG/1
CAPSULE, DELAYED RELEASE ORAL
Qty: 90 CAPSULE | Refills: 3 | Status: SHIPPED | OUTPATIENT
Start: 2022-12-09 | End: 2023-12-29

## 2022-12-09 RX ORDER — ESTRADIOL 0.05 MG/D
1 PATCH, EXTENDED RELEASE TRANSDERMAL
Qty: 8 PATCH | Refills: 3 | Status: SHIPPED | OUTPATIENT
Start: 2022-12-12 | End: 2023-04-10

## 2022-12-09 RX ORDER — LEVOTHYROXINE SODIUM 137 UG/1
137 TABLET ORAL DAILY
Qty: 90 TABLET | Refills: 3 | Status: SHIPPED | OUTPATIENT
Start: 2022-12-09 | End: 2024-01-09

## 2022-12-09 RX ORDER — TRIAMCINOLONE ACETONIDE 1 MG/G
CREAM TOPICAL
Qty: 80 G | Refills: 2 | Status: SHIPPED | OUTPATIENT
Start: 2022-12-09 | End: 2024-02-22

## 2022-12-09 SDOH — ECONOMIC STABILITY: INCOME INSECURITY: HOW HARD IS IT FOR YOU TO PAY FOR THE VERY BASICS LIKE FOOD, HOUSING, MEDICAL CARE, AND HEATING?: NOT VERY HARD

## 2022-12-09 SDOH — ECONOMIC STABILITY: INCOME INSECURITY: IN THE LAST 12 MONTHS, WAS THERE A TIME WHEN YOU WERE NOT ABLE TO PAY THE MORTGAGE OR RENT ON TIME?: NO

## 2022-12-09 SDOH — ECONOMIC STABILITY: FOOD INSECURITY: WITHIN THE PAST 12 MONTHS, YOU WORRIED THAT YOUR FOOD WOULD RUN OUT BEFORE YOU GOT MONEY TO BUY MORE.: NEVER TRUE

## 2022-12-09 SDOH — HEALTH STABILITY: PHYSICAL HEALTH: ON AVERAGE, HOW MANY DAYS PER WEEK DO YOU ENGAGE IN MODERATE TO STRENUOUS EXERCISE (LIKE A BRISK WALK)?: 1 DAY

## 2022-12-09 SDOH — HEALTH STABILITY: PHYSICAL HEALTH: ON AVERAGE, HOW MANY MINUTES DO YOU ENGAGE IN EXERCISE AT THIS LEVEL?: 30 MIN

## 2022-12-09 SDOH — ECONOMIC STABILITY: FOOD INSECURITY: WITHIN THE PAST 12 MONTHS, THE FOOD YOU BOUGHT JUST DIDN'T LAST AND YOU DIDN'T HAVE MONEY TO GET MORE.: NEVER TRUE

## 2022-12-09 ASSESSMENT — ENCOUNTER SYMPTOMS
EYE PAIN: 0
SORE THROAT: 0
NERVOUS/ANXIOUS: 1
HEMATURIA: 0
HEMATOCHEZIA: 0
CHILLS: 0
PALPITATIONS: 0
FEVER: 0
SHORTNESS OF BREATH: 0
ARTHRALGIAS: 1
JOINT SWELLING: 1
CONSTIPATION: 0
NAUSEA: 0
HEARTBURN: 0
BREAST MASS: 0
FREQUENCY: 1
ABDOMINAL PAIN: 0
HEADACHES: 1
DYSURIA: 0
PARESTHESIAS: 0
COUGH: 0
MYALGIAS: 0
DIARRHEA: 0
WEAKNESS: 0

## 2022-12-09 ASSESSMENT — SOCIAL DETERMINANTS OF HEALTH (SDOH)
HOW OFTEN DO YOU GET TOGETHER WITH FRIENDS OR RELATIVES?: ONCE A WEEK
DO YOU BELONG TO ANY CLUBS OR ORGANIZATIONS SUCH AS CHURCH GROUPS UNIONS, FRATERNAL OR ATHLETIC GROUPS, OR SCHOOL GROUPS?: NO
IN A TYPICAL WEEK, HOW MANY TIMES DO YOU TALK ON THE PHONE WITH FAMILY, FRIENDS, OR NEIGHBORS?: ONCE A WEEK
HOW OFTEN DO YOU ATTEND CHURCH OR RELIGIOUS SERVICES?: NEVER

## 2022-12-09 ASSESSMENT — PATIENT HEALTH QUESTIONNAIRE - PHQ9
10. IF YOU CHECKED OFF ANY PROBLEMS, HOW DIFFICULT HAVE THESE PROBLEMS MADE IT FOR YOU TO DO YOUR WORK, TAKE CARE OF THINGS AT HOME, OR GET ALONG WITH OTHER PEOPLE: NOT DIFFICULT AT ALL
SUM OF ALL RESPONSES TO PHQ QUESTIONS 1-9: 3
SUM OF ALL RESPONSES TO PHQ QUESTIONS 1-9: 3

## 2022-12-09 ASSESSMENT — LIFESTYLE VARIABLES
HOW OFTEN DO YOU HAVE SIX OR MORE DRINKS ON ONE OCCASION: NEVER
SKIP TO QUESTIONS 9-10: 1
AUDIT-C TOTAL SCORE: 2
HOW OFTEN DO YOU HAVE A DRINK CONTAINING ALCOHOL: 2-4 TIMES A MONTH
HOW MANY STANDARD DRINKS CONTAINING ALCOHOL DO YOU HAVE ON A TYPICAL DAY: 1 OR 2

## 2022-12-09 ASSESSMENT — PAIN SCALES - GENERAL: PAINLEVEL: MODERATE PAIN (4)

## 2022-12-09 NOTE — PROGRESS NOTES
SUBJECTIVE:   CC: Carin is an 47 year old who presents for preventive health visit.   Patient has been advised of split billing requirements and indicates understanding: Yes  Healthy Habits:     Getting at least 3 servings of Calcium per day:  Yes    Bi-annual eye exam:  Yes    Dental care twice a year:  Yes    Sleep apnea or symptoms of sleep apnea:  None    Diet:  Regular (no restrictions)    Frequency of exercise:  1 day/week    Duration of exercise:  15-30 minutes    Taking medications regularly:  Yes    Medication side effects:  Not applicable    PHQ-2 Total Score: 0    Additional concerns today:  Yes    # Social  - her older son is in a new group home and doing okay    # Menopause  - 3-5 nights wakes up soaked    # Headaches  - half the days of the month has a headache    # Mental health  - trazodone   - EMDR  cymbalta 60     # Hypothyroidism  TSH   Date Value Ref Range Status   12/03/2021 0.77 0.40 - 4.00 mU/L Final   10/02/2020 2.38 0.40 - 4.00 mU/L Final     - synthroid     # Weight       Wt Readings from Last 4 Encounters:   12/03/21 118 kg (260 lb 3.2 oz)   11/18/21 117 kg (258 lb)   09/02/21 113.4 kg (250 lb)   06/23/21 124.7 kg (275 lb)   - wegovy     Today's PHQ-2 Score:   PHQ-2 ( 1999 Pfizer) 12/9/2022   Q1: Little interest or pleasure in doing things 0   Q2: Feeling down, depressed or hopeless 0   PHQ-2 Score 0   PHQ-2 Total Score (12-17 Years)- Positive if 3 or more points; Administer PHQ-A if positive -   Q1: Little interest or pleasure in doing things Not at all   Q2: Feeling down, depressed or hopeless Not at all   PHQ-2 Score 0       Social History     Tobacco Use     Smoking status: Never     Smokeless tobacco: Never   Substance Use Topics     Alcohol use: Yes     Alcohol/week: 3.0 standard drinks     Comment: 1 month         Alcohol Use 12/9/2022   Prescreen: >3 drinks/day or >7 drinks/week? No   Prescreen: >3 drinks/day or >7 drinks/week? -       Reviewed orders with patient.  Reviewed  "health maintenance and updated orders accordingly - Yes  Labs reviewed in EPIC    Breast Cancer Screening:    Breast CA Risk Assessment (FHS-7) 12/2/2021   Do you have a family history of breast, colon, or ovarian cancer? No / Unknown         Mammogram Screening: Recommended annual mammography  Pertinent mammograms are reviewed under the imaging tab.    History of abnormal Pap smear: NO - age 30-65 PAP every 5 years with negative HPV co-testing recommended  PAP / HPV Latest Ref Rng & Units 6/22/2017 7/24/2014 12/4/2007   PAP (Historical) - NIL NIL NIL   HPV16 NEG Negative - -   HPV18 NEG Negative - -   HRHPV NEG Negative - -     Reviewed and updated as needed this visit by clinical staff   Tobacco  Allergies  Meds              Reviewed and updated as needed this visit by Provider                   Review of Systems   Constitutional: Negative for chills and fever.   HENT: Negative for congestion, ear pain, hearing loss and sore throat.    Eyes: Negative for pain and visual disturbance.   Respiratory: Negative for cough and shortness of breath.    Cardiovascular: Negative for chest pain, palpitations and peripheral edema.   Gastrointestinal: Negative for abdominal pain, constipation, diarrhea, heartburn, hematochezia and nausea.   Breasts:  Positive for tenderness. Negative for breast mass and discharge.   Genitourinary: Positive for frequency, urgency and vaginal bleeding. Negative for dysuria, genital sores, hematuria, pelvic pain and vaginal discharge.   Musculoskeletal: Positive for arthralgias and joint swelling. Negative for myalgias.   Skin: Positive for rash.   Neurological: Positive for headaches. Negative for weakness and paresthesias.   Psychiatric/Behavioral: Negative for mood changes. The patient is nervous/anxious.         OBJECTIVE:   /72   Pulse 84   Temp 98.4  F (36.9  C) (Tympanic)   Resp 16   Ht 1.664 m (5' 5.5\")   Wt 128.8 kg (284 lb)   LMP 11/14/2022   SpO2 97%   BMI 46.54 kg/m  "   Physical Exam  GENERAL: healthy, alert and no distress  EYES: Eyes grossly normal to inspection, PERRL and conjunctivae and sclerae normal  HENT: ear canals and TM's normal, nose and mouth without ulcers or lesions  NECK: no adenopathy, no asymmetry, masses, or scars and thyroid normal to palpation  RESP: lungs clear to auscultation - no rales, rhonchi or wheezes  CV: regular rate and rhythm, normal S1 S2, no S3 or S4, no murmur, click or rub, no peripheral edema and peripheral pulses strong  ABDOMEN: soft, nontender, no hepatosplenomegaly, no masses and bowel sounds normal   (female): normal female external genitalia, normal urethral meatus, vaginal mucosa pink, moist, well rugated, and normal cervix/adnexa/uterus without masses or discharge  MS: no gross musculoskeletal defects noted, no edema  SKIN: no suspicious lesions or rashes  NEURO: Normal strength and tone, mentation intact and speech normal  PSYCH: mentation appears normal, affect normal/bright    Diagnostic Test Results:  Labs reviewed in Epic    ASSESSMENT/PLAN:   (Z00.00) Routine general medical examination at a health care facility  (primary encounter diagnosis)  The 10-year ASCVD risk score (Joy GARCIA, et al., 2019) is: 0.5%    Values used to calculate the score:      Age: 47 years      Sex: Female      Is Non- : No      Diabetic: No      Tobacco smoker: No      Systolic Blood Pressure: 110 mmHg      Is BP treated: No      HDL Cholesterol: 73 mg/dL      Total Cholesterol: 188 mg/dL    - utd vacccines  - mammo scheduled  - going back to work - she will let me know what kind of schedule she is allowed. I support a gradual reentry.     # Menopause  - See Pathagilityt message    (E66.01,  Z68.41) Class 3 severe obesity due to excess calories with serious comorbidity and body mass index (BMI) of 40.0 to 44.9 in adult (H)  Re-establishing with weight clinic.  Plan: Comprehensive metabolic panel (BMP + Alb, Alk         Phos, ALT, AST,  Total. Bili, TP), Hemoglobin         A1c    (F33.42) Recurrent major depressive disorder, in full remission (H)  (F51.04) Psychophysiological insomnia  Has good support in place - EMDR.   Tried to wean trazodone but couldn't - okay to continue.  Plan: DULoxetine (CYMBALTA) 60 MG capsule  Plan: traZODone (DESYREL) 50 MG tablet    (E89.0) Hypothyroidism, postsurgical  Plan: TSH WITH FREE T4 REFLEX, levothyroxine         (SYNTHROID/LEVOTHROID) 137 MCG tablet    (L24.9) Irritant dermatitis  Plan: triamcinolone (KENALOG) 0.1 % external cream    (Z12.11) Screen for colon cancer  Plan: Colonoscopy Screening  Referral    (Z12.4) Cervical cancer screening  Plan: Pap Screen with HPV - recommended age 30 - 65         years    (Z12.31) Encounter for screening mammogram for breast cancer  Plan: *MA Screening Digital Bilateral          (Z13.1) Screening for diabetes mellitus  Plan: Hemoglobin A1c    COUNSELING:  Reviewed preventive health counseling, as reflected in patient instructions    She reports that she has never smoked. She has never used smokeless tobacco.    Dusty Bang MD  [facility]  Answers for HPI/ROS submitted by the patient on 12/9/2022  If you checked off any problems, how difficult have these problems made it for you to do your work, take care of things at home, or get along with other people?: Not difficult at all  PHQ9 TOTAL SCORE: 3

## 2022-12-09 NOTE — PATIENT INSTRUCTIONS
Messsage me with how you want me to fill out form. I could say 1 week of 4 hour shifts, 1 week of 8 hours, then 12 hours.    Let me get back to you on the hormone replacement - I think we can try something for this.         Preventive Health Recommendations  Female Ages 40 to 49    Yearly exam:   See your health care provider every year in order to  Review health changes.   Discuss preventive care.    Review your medicines if your doctor prescribed any.    Get a Pap test every three years (unless you have an abnormal result and your provider advises testing more often).    If you get Pap tests with HPV test, you only need to test every 5 years, unless you have an abnormal result. You do not need a Pap test if your uterus was removed (hysterectomy) and you have not had cancer.    You should be tested each year for STDs (sexually transmitted diseases), if you're at risk.   Ask your doctor if you should have a mammogram.    Have a colonoscopy (test for colon cancer) if someone in your family has had colon cancer or polyps before age 50.     Have a cholesterol test every 5 years.     Have a diabetes test (fasting glucose) after age 45. If you are at risk for diabetes, you should have this test every 3 years.    Shots: Get a flu shot each year. Get a tetanus shot every 10 years.     Nutrition:   Eat at least 5 servings of fruits and vegetables each day.  Eat whole-grain bread, whole-wheat pasta and brown rice instead of white grains and rice.  Get adequate Calcium and Vitamin D.      Lifestyle  Exercise at least 150 minutes a week (an average of 30 minutes a day, 5 days a week). This will help you control your weight and prevent disease.  Limit alcohol to one drink per day.  No smoking.   Wear sunscreen to prevent skin cancer.  See your dentist every six months for an exam and cleaning.

## 2022-12-09 NOTE — TELEPHONE ENCOUNTER
Scripts sent - pelase schedule f/up VV in 4-6 weeks. Can adjust dose then if needed.     SUREKHA Bang MD  Internal Medicine-Pediatrics

## 2022-12-09 NOTE — PROGRESS NOTES
# Mental health  - trazodone   - EMDR  cymbalta 60     # Hypothyroidism        TSH   Date Value Ref Range Status   10/02/2020 2.38 0.40 - 4.00 mU/L Final      - synthroid     # Weight       Wt Readings from Last 4 Encounters:   12/03/21 118 kg (260 lb 3.2 oz)   11/18/21 117 kg (258 lb)   09/02/21 113.4 kg (250 lb)   06/23/21 124.7 kg (275 lb)   - ayesha

## 2022-12-13 LAB
BKR LAB AP GYN ADEQUACY: NORMAL
BKR LAB AP GYN INTERPRETATION: NORMAL
BKR LAB AP HPV REFLEX: NORMAL
BKR LAB AP PREVIOUS ABNORMAL: NORMAL
PATH REPORT.COMMENTS IMP SPEC: NORMAL
PATH REPORT.COMMENTS IMP SPEC: NORMAL
PATH REPORT.RELEVANT HX SPEC: NORMAL

## 2022-12-14 LAB
HUMAN PAPILLOMA VIRUS 16 DNA: NEGATIVE
HUMAN PAPILLOMA VIRUS 18 DNA: NEGATIVE
HUMAN PAPILLOMA VIRUS FINAL DIAGNOSIS: NORMAL
HUMAN PAPILLOMA VIRUS OTHER HR: NEGATIVE

## 2022-12-20 ENCOUNTER — TRANSFERRED RECORDS (OUTPATIENT)
Dept: HEALTH INFORMATION MANAGEMENT | Facility: CLINIC | Age: 47
End: 2022-12-20

## 2022-12-22 ENCOUNTER — VIRTUAL VISIT (OUTPATIENT)
Dept: ENDOCRINOLOGY | Facility: CLINIC | Age: 47
End: 2022-12-22
Payer: COMMERCIAL

## 2022-12-22 ENCOUNTER — TELEPHONE (OUTPATIENT)
Dept: ENDOCRINOLOGY | Facility: CLINIC | Age: 47
End: 2022-12-22

## 2022-12-22 VITALS — HEIGHT: 66 IN | BODY MASS INDEX: 45 KG/M2 | WEIGHT: 280 LBS

## 2022-12-22 DIAGNOSIS — E66.01 CLASS 3 SEVERE OBESITY DUE TO EXCESS CALORIES WITH SERIOUS COMORBIDITY AND BODY MASS INDEX (BMI) OF 40.0 TO 44.9 IN ADULT (H): Primary | ICD-10-CM

## 2022-12-22 DIAGNOSIS — E66.813 CLASS 3 SEVERE OBESITY DUE TO EXCESS CALORIES WITH SERIOUS COMORBIDITY AND BODY MASS INDEX (BMI) OF 40.0 TO 44.9 IN ADULT (H): Primary | ICD-10-CM

## 2022-12-22 PROCEDURE — 99213 OFFICE O/P EST LOW 20 MIN: CPT | Mod: 95 | Performed by: NURSE PRACTITIONER

## 2022-12-22 RX ORDER — SEMAGLUTIDE 0.25 MG/.5ML
0.25 INJECTION, SOLUTION SUBCUTANEOUS
Qty: 2 ML | Refills: 0 | Status: SHIPPED | OUTPATIENT
Start: 2022-12-22 | End: 2023-03-03

## 2022-12-22 RX ORDER — SEMAGLUTIDE 0.5 MG/.5ML
0.5 INJECTION, SOLUTION SUBCUTANEOUS
Qty: 2 ML | Refills: 1 | Status: SHIPPED | OUTPATIENT
Start: 2022-12-22 | End: 2023-03-03

## 2022-12-22 RX ORDER — SEMAGLUTIDE 1 MG/.5ML
1 INJECTION, SOLUTION SUBCUTANEOUS
Qty: 2 ML | Refills: 0 | Status: SHIPPED | OUTPATIENT
Start: 2022-12-22 | End: 2023-03-03

## 2022-12-22 ASSESSMENT — PAIN SCALES - GENERAL: PAINLEVEL: MILD PAIN (3)

## 2022-12-22 NOTE — LETTER
2022       RE: Kaleigh Nam  [address]  [place] [address]     Dear Colleague,    Thank you for referring your patient, Kaleigh Nam, to the [facility] [place] at [facility]. Please see a copy of my visit note below.      Return Medical Weight Management Note     Kaleigh Nam  MRN:  [id number]  :  1975  ELIAS:  2022    Dear Dusty Bang MD,    I had the pleasure of seeing your patient Kaleigh Nam. She is a 47 year old female who I am continuing to see for treatment of obesity related to:       2021   I have the following health issues associated with obesity: Pre-Diabetes, GERD (Reflux), Stress Incontinence, Osteoarthritis (joint disease), Hypothyroidism   I have the following symptoms associated with obesity: Knee Pain, Depression, Lower Extremity Swelling, Back Pain, Fatigue, Groin Rash, Hip Pain       Assessment & Plan   Problem List Items Addressed This Visit        Digestive    Class 3 severe obesity due to excess calories with serious comorbidity and body mass index (BMI) of 40.0 to 44.9 in adult (H) - Primary     Has been off wegovy since this summer. Lots of trauma/ stress with loss of step son this summer. Ready to start working on weight management again. Would like to retry wegovy- beneficial historically. Will restart taper up.     2 children and wife living with her. Reports they all need to work on weight management. Son is seen in peds clinic for weight management. Would like to follow up with RD and maybe bring son with.     Plan:  Follow up with dietitian   Start wegovy   Continue with PT   Follow up 3 months          Relevant Medications    Semaglutide-Weight Management (WEGOVY) 0.25 MG/0.5ML SOAJ    Semaglutide-Weight Management (WEGOVY) 0.5 MG/0.5ML SOAJ    Semaglutide-Weight Management (WEGOVY) 1 MG/0.5ML SOAJ      Starting weight actually  283    INTERVAL HISTORY:  Last seen 2021-     Has been having a lot of perimenopause symptoms just started on hormone replacement therapy     Once child - 17yo seen in peds clinic     Anti-obesity medications:     Current:   Wegovy stopped this summer - ready to start focus on weight management again     Recent diet changes:   Working on increasing veggies   Using [facility] cook book     Recent exercise/activity changes: foot issues last seen, was having worse knee pain recent (arthritis)    Helpful to keep moving   Lots of standing at work - plans to find good stable shoes for work   Starting physical therapy     Recent stressors: wife's son  by suicide this summer- lots of stress and grief. Going back to work next week (has been on leave)   Working with therapist - has worked on trauma therapy too   Had to move - child  in home - they did CPR     Recent sleep changes: trazadone helpful     Vitamins/Labs: 2022    CURRENT WEIGHT:   280 lbs 0 oz    Initial Weight (lbs): 275 lbs  Last Visits Weight: 128.8 kg (284 lb)  Cumulative weight loss (lbs): -5  Weight Loss Percentage: -1.82%    Changes and Difficulties 2022   I have made the following changes to my diet since my last visit: Added more vegetables and whole grains: using   Cook Smart, Eat Well from [facility]   With regards to my diet, I am still struggling with: Sweet treats and quick meals   I have made the following changes to my activity/exercise since my last visit: No changes; 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         MEDICATIONS:   Current Outpatient Medications   Medication Sig Dispense Refill     Cholecalciferol (VITAMIN D3) 50 MCG ( UT) CAPS        clindamycin (CLINDAMAX) 1 % external gel Apply to AA QD 60 g 11     DULoxetine (CYMBALTA) 60 MG capsule TAKE ONE CAPSULE BY MOUTH ONCE DAILY 90 capsule 3     estradiol (VIVELLE-DOT)  "0.05 MG/24HR bi-weekly patch Place 1 patch onto the skin twice a week 8 patch 3     ferrous sulfate (FEROSUL) 325 (65 Fe) MG tablet Take 1 tablet (325 mg) by mouth daily (with breakfast) 90 tablet 3     levothyroxine (SYNTHROID/LEVOTHROID) 137 MCG tablet Take 1 tablet (137 mcg) by mouth daily 90 tablet 3     progesterone (PROMETRIUM) 100 MG capsule Take 100 mg for 12 days, starting on day 14 of your cycle (period starts day 1). 90 capsule 0     Semaglutide-Weight Management (WEGOVY) 0.25 MG/0.5ML SOAJ Inject 0.25 mg Subcutaneous every 7 days 2 mL 0     Semaglutide-Weight Management (WEGOVY) 0.5 MG/0.5ML SOAJ Inject 0.5 mg Subcutaneous every 7 days 2 mL 1     Semaglutide-Weight Management (WEGOVY) 1 MG/0.5ML SOAJ Inject 1 mg Subcutaneous every 7 days 2 mL 0     traZODone (DESYREL) 50 MG tablet Take 1-2 tablets ( mg) by mouth nightly as needed for sleep 90 tablet 3     triamcinolone (KENALOG) 0.1 % external cream Apply to AA on the hands and arms BID x 1-2 week then PRN only 80 g 2       Weight Loss Medication History Reviewed With Patient 12/16/2022   Which weight loss medications are you currently taking on a regular basis?  None   If you are not taking a weight loss medication that was prescribed to you, please indicate why: Other   Are you having any side effects from the weight loss medication that we have prescribed you? Yes   If you are having side effects please describe: Nausea and constipation       PHYSICAL EXAM:  Objective     Ht 1.676 m (5' 6\")   Wt 127 kg (280 lb)   LMP 11/14/2022   BMI 45.19 kg/m      Vitals - Patient Reported  Pain Score: Mild Pain (3) (left knee pain)      Vitals:  No vitals were obtained today due to virtual visit.    GENERAL: Healthy, alert and no distress  EYES: Eyes grossly normal to inspection.  No discharge or erythema, or obvious scleral/conjunctival abnormalities.  RESP: No audible wheeze, cough, or visible cyanosis.  No visible retractions or increased work of " breathing.    SKIN: Visible skin clear. No significant rash, abnormal pigmentation or lesions.  NEURO: Cranial nerves grossly intact.  Mentation and speech appropriate for age.  PSYCH: Mentation appears normal, affect normal/bright, judgement and insight intact, normal speech and appearance well-groomed.        Sincerely,    Lisa Andre NP      21 minutes spent on the date of the encounter doing chart review, history and exam, documentation and further activities per the note    Kaleigh Nam is a 47 year old who is being evaluated via a billable video visit.      How would you like to obtain your AVS? MyChart  If the video visit is dropped, the invitation should be resent by: Text to cell phone: [phone]  Will anyone else be joining your video visit? No  If patient encounters technical issues they should call [phone]    During this virtual visit the patient is located in [place], patient verifies this as the location during the entirety of this visit.     Video-Visit Details  Video Start Time: 0933    Type of service:  Video Visit    Video End Time:0948    Originating Location (pt. Location): Home  Distant Location (provider location):  [facility]  Platform used for Video Visit: HUY [place]-P 12/22/2022      8:51 AM

## 2022-12-22 NOTE — ASSESSMENT & PLAN NOTE
Has been off wegovy since this summer. Lots of trauma/ stress with loss of step son this summer. Ready to start working on weight management again. Would like to retry wegovy- beneficial historically. Will restart taper up.     2 children and wife living with her. Reports they all need to work on weight management. Son is seen in peds clinic for weight management. Would like to follow up with RD and maybe bring son with.     Plan:  Follow up with dietitian   Start wegovy   Continue with PT   Follow up 3 months

## 2022-12-22 NOTE — TELEPHONE ENCOUNTER
Prior Authorization Retail Medication Request    Medication/Dose: Wegovy  ICD code (if different than what is on RX):  Class 3 severe obesity due to excess calories with serious comorbidity and body mass index (BMI) of 40.0 to 44.9 in adult (H) [E66.01, Z68.41]  - Primary     Previously Tried and Failed:  History of diet and exercise  Rationale:  I had the pleasure of seeing your patient Kaleigh Nam. She is a 47 year old female who I am continuing to see for treatment of obesity related to: Pre-Diabetes, GERD (Reflux), Stress Incontinence, Osteoarthritis (joint disease), Hypothyroidism.     Would like to retry wegovy- beneficial historically. Will restart taper up.     Anti-obesity medications:      Current:   Wegovy stopped this summer - ready to start focus on weight management again      Recent diet changes:   Working on increasing veggies   Using [facility] cook book      Recent exercise/activity changes: foot issues last seen, was having worse knee pain recent (arthritis)    Helpful to keep moving   Lots of standing at work - plans to find good stable shoes for work   Starting physical therapy     CURRENT WEIGHT:   280 lbs 0 oz     Initial Weight (lbs): 275 lbs  Last Visits Weight: 128.8 kg (284 lb)  Cumulative weight loss (lbs): -5  Weight Loss Percentage: -1.82%    Insurance Name:    Insurance ID:        Pharmacy Information (if different than what is on RX)  Name:  [facility] - [id number]   Phone:  [phone]

## 2022-12-22 NOTE — PATIENT INSTRUCTIONS
"Thank you for allowing us the privilege of caring for you. We hope we provided you with the excellent service you deserve.   Please let us know if there is anything else we can do for you so that we can be sure you are completely satisfied with your care experience.    To ensure the quality of our services you may be receiving a patient satisfaction survey from an independent patient satisfaction monitoring company.    The greatest compliment you can give is a \"Likely to Recommend\"    Your visit was with Lisa Andre NP today.    Instructions per today's visit:     Dimitry Nam, it was great to visit with you today.  Here is a review of our visit.  If our clinic scheduler is not able to reach you please call [phone] to schedule your next appointments.    Plan:  Follow up with dietitian   Start wegovy   Continue with PT   Follow up 3 months       Information about Video Visits with [facility]: video visit information  _________________________________________________________________________________________________________________________________________________________  If you are asked by your clinic team to have your blood pressure checked:  [facility] do offer several locations for blood pressure checks. Please follow the below link to schedule an appointment. Scheduling an appointment at the pharmacy for a blood pressure check is now preferred.    Appointment Plus ([email or web address])  _________________________________________________________________________________________________________________________________________________________  Important contact and scheduling information:  Please call our contact center at [phone] to schedule your next appointments.  To find a lab location near you, please call [phone].  For any nursing questions or concerns call Denita Gutiérrez LPN at [phone] or Jadyn Rodgers RN at [phone]  Please call during clinic hours Monday through " Friday 8:00a - 4:00p if you have questions or you can contact us via Xipin at anytime and we will reply during clinic hours.    Lab results will be communicated through My Chart or letter (if My Chart not used). Please call the clinic if you have not received communication after 1 week or if you have any questions.?  Clinic Fax: [phone]    _________________________________________________________________________________________________________________________________________________________  Meal Replacement Products:    Here is the link to our new e-store where you can purchase our meal replacement products    TapMyBack E-Store  [employer]/store    The one week starter kit is a great way to sample a variety of products and see what works for you.    If you want more information about the product go to: Fresh Steps Meals  Yedda.Hydrelis    If you are an employee or [facility] or  [employer] please contact your care team for a 10% estore discount    Free Shipping for orders over $75     Benefits of meal replacements products:    Portion and calorie control  Improved nutrition  Structured eating  Simplified food choices  Avoid contact with trigger foods  _________________________________________________________________________________________________________________________________________________________  Interested in working with a health ?  Health coaches work with you to improve your overall health and wellbeing.  They look at the whole person, and may involve discussion of different areas of life, including, but not limited to the four pillars of health (sleep, exercise, nutrition, and stress management). Discuss with your care team if you would like to start working a health .  Health Coaching-3 Pack: Schedule by calling [phone]    $99 for three health coaching visits    Visits may be done in person or via phone    Coaching is a partnership  between the  and the client; Coaches do not prescribe or diagnose    Coaching helps inspire the client to reach his/her personal goals   _________________________________________________________________________________________________________________________________________________________  24 Week Healthy Lifestyle Plan:    Our mission in the 24-week Healthy Lifestyle Plan is to provide you with individualized care by giving you the tools, education and support you need to lose weight and maintain a healthy lifestyle. In your 24-week journey, you ll be supported by a dedicated weight loss team that includes registered dietitians, medical weight management providers, health coaches, and nurses -- all with special expertise in weight loss -- to help you every step of the way.     Monthly meetings with your registered dietician or medical weight management provider help to review your progress, update your care plan, and make any adjustments needed to ensure success. Between these visits, weekly and bi-weekly health  visits will help you focus on the four pillars of weight loss -- stress, sleep, nutrition, and exercise -- and how you can best adapt each to achieve sustainable weight loss results.    In addition, you will be given exclusive access to online wellbeing classes through [facility].  Your initial visit will be with a medical weight management provider who will help to understand your weight loss goals and ensure this program is the right fit for you. Please let our team know if you are interested in the 24 week plan by sending a message to your care team or calling [phone] to schedule.  _________________________________________________________________________________________________________________________________________________________    COMPREHENSIVE WEIGHT MANAGEMENT PROGRAM  VIRTUAL SUPPORT GROUPS    For Support Group Information:      We offer support groups for patients who are working  "on weight loss and considering, preparing for or have had weight loss surgery.   There is no cost for this opportunity.  You are invited to attend the?Virtual Support Groups?provided by any of the following locations:    [facility] via Microsoft Teams with Debi Bonilla RN  2.   [place] via [employer] with Jesus Hahn, PhD, LP  3.   [place] via [employer] with Kimmie Monk RN  4.   [facility] via [employer] Teams with Kimmie Suero [facility]    The following Support Group information can also be found on our website:  [email or web address]    [facility] Weight Loss Surgery Support Group    [facility] Weight Loss Surgery Support Group  The support group is a patient-lead forum that meets monthly to share experiences, encouragement and education. It is open to those who have had weight loss surgery, are scheduled for surgery, and those who are considering surgery.   WHEN: This group meets on the 3rd Wednesday of each month from 5:00PM - 6:00PM virtually using Microsoft Teams.   FACILITATOR: Led by Debi Bonilla, RD, LD, RN, the program's Clinical Coordinator.   TO REGISTER: Please contact the clinic via I-Works or call the nurse line directly at [phone] to inform our staff that you would like an invite sent to you and to let us know the email you would like the invite sent to. Prior to the meeting, a link with directions on how to join the meeting will be sent to you.    2022 Meetings  Lub[place] 15: \"Let's Talk\" a time for the group to share.  July 20: \"Let's Talk\" a time for the group to share.  August 17: \"Let's Talk\" a time for the group to share.  September 21: \"Let's Talk\" a time for the group to share.  October 19: Guest Speaker: Dr Jc Small MD Pulmonologist and Sleep Medicine Physician, \"Getting a Good Night's Sleep\".  November 16: \"Let's Talk\" a time for the group to share.  December 21: \"Let's Talk\" a time " "for the group to share.    [facility] and [facility] Support Groups    Connections: Bariatric Care Support Group?  This is open to all [facility] (and those external to this program) pre- and post- operative bariatric surgery patients as well as their support system.   WHEN: This group meets the 2nd Tuesday of each month from 6:30 PM - 8:00 PM virtually using Microsoft Teams.   FACILITATOR: Led by Jesus Hahn, Ph.D who is a Licensed Psychologist with the [facility] Comprehensive Weight Management Program.   TO REGISTER: Please send an email to Jesus Hahn, Ph.D., LP [email or web address]?if you would like an invitation to the group and to learn about using Microsoft Teams.    2022 Meetings  June 14: Janessa Gregorio RD, LD at [facility], \"Nutritional Labeling\"  July 12 August 2 (Please Note Date Change)  September 13 October 11 November 8 December 13    Connections: Post-Operative Bariatric Surgery Support Group  This is a support group for [facility] bariatric patients (and those external to [facility]) who have had bariatric surgery and are at least 3 months post-surgery.  WHEN: This support group meets the 4th Wednesday of the month from 11:00 AM - 12:00 PM virtually using Microsoft Teams.   FACILITATOR: Led by Certified Bariatric Nurse, Kimmie Monk RN.   TO REGISTER: Please send an email to Kimmie at [email or web address] if you would like an invitation to the group and to learn about using Microsoft Teams.    2022 Meetings June 22 July 27 August 24 September 28 October 26 November 23 December 28      [facility] Healthy Lifestyle Virtual Support Group    Healthy Lifestyle Virtual Support Group?  This is 60 minutes of small group guided discussion, support and resources. All are welcome who want a healthy lifestyle.  WHEN: This group meets monthly on a Friday from 12:30 PM - 1:30 " "PM virtually using Microsoft Teams.   FACILITATOR: Led by National Board Certified Health and , Kimmie Suero [facility].   TO REGISTER: Please send an email to Kimmie [email or web address] to receive monthly invites to the group or if you have any questions about having a health .  Prior to the meeting, a link with directions on how to join the meeting will be sent to you.    2022 Meetings  June 24: Kimmie Suero [facility], \"Setting Limits and Boundaries\".  Jul 29: Open Forum  August 26: Guest Speaker: Leslie Borja Registered Dietitian  September 30: Open Forum  October 28th: Guest Speaker: Netta De La Rosa [facility], Health , \"Gratitude Practices\".  November 18: Guest Speaker: Shaista Diallo RD Registered Dietitian, \"Navigating How to Eat around the Holidays\".  December 16: Guest Speaker: Ana Justin [facility], \"Changing Your Relationship with Movement\".    ____________________________________________________________________________________________________________________________________________________________________________  [facility] Get Moving Program  Our team of physical therapists is trained to help you understand and take control of your condition. They will perform a thorough evaluation to determine your ability for activity and develop a customized plan to fit your goals and physical ability.  Scheduling: Unsure if the Get Moving program is right for you? Discuss the program with your medical provider or diabetes educator. You can also call us at [phone] to ask questions or schedule an appointment.   [facility] Get Moving Program  __________________________________________________________________________________________________________________________________________________________________________________________________________________________________________________    To work with a Behavioral Health Psychologist:    Call to schedule:    Jean Christine091) " [phone]  Valeria Jurado - [phone]  Lidia Love - [phone]  Padmini Brandon - [phone]   Janeth Carrera PhD (cannot accept Medicare) [phone]        Thank you,   [facility] Comprehensive Weight Management Team

## 2022-12-22 NOTE — PROGRESS NOTES
Return Medical Weight Management Note     Kaleigh Nam  MRN:  [id number]  :  1975  ELIAS:  2022    Dear Dusty Bang MD,    I had the pleasure of seeing your patient Kaleigh Nam. She is a 47 year old female who I am continuing to see for treatment of obesity related to:       2021   I have the following health issues associated with obesity: Pre-Diabetes, GERD (Reflux), Stress Incontinence, Osteoarthritis (joint disease), Hypothyroidism   I have the following symptoms associated with obesity: Knee Pain, Depression, Lower Extremity Swelling, Back Pain, Fatigue, Groin Rash, Hip Pain       Assessment & Plan   Problem List Items Addressed This Visit        Digestive    Class 3 severe obesity due to excess calories with serious comorbidity and body mass index (BMI) of 40.0 to 44.9 in adult (H) - Primary     Has been off wegovy since this summer. Lots of trauma/ stress with loss of step son this summer. Ready to start working on weight management again. Would like to retry wegovy- beneficial historically. Will restart taper up.     2 children and wife living with her. Reports they all need to work on weight management. Son is seen in peds clinic for weight management. Would like to follow up with RD and maybe bring son with.     Plan:  Follow up with dietitian   Start wegovy   Continue with PT   Follow up 3 months          Relevant Medications    Semaglutide-Weight Management (WEGOVY) 0.25 MG/0.5ML SOAJ    Semaglutide-Weight Management (WEGOVY) 0.5 MG/0.5ML SOAJ    Semaglutide-Weight Management (WEGOVY) 1 MG/0.5ML SOAJ      Starting weight actually 283    INTERVAL HISTORY:  Last seen 2021-     Has been having a lot of perimenopause symptoms just started on hormone replacement therapy     Once child - 15yo seen in peds clinic     Anti-obesity medications:     Current:   Wegovy stopped this summer - ready to start focus on weight management again     Recent diet changes:    Working on increasing veggies   Using [facility] cook book     Recent exercise/activity changes: foot issues last seen, was having worse knee pain recent (arthritis)    Helpful to keep moving   Lots of standing at work - plans to find good stable shoes for work   Starting physical therapy     Recent stressors: wife's son  by suicide this summer- lots of stress and grief. Going back to work next week (has been on leave)   Working with therapist - has worked on trauma therapy too   Had to move - child  in home - they did CPR     Recent sleep changes: trazadone helpful     Vitamins/Labs: 2022    CURRENT WEIGHT:   280 lbs 0 oz    Initial Weight (lbs): 275 lbs  Last Visits Weight: 128.8 kg (284 lb)  Cumulative weight loss (lbs): -5  Weight Loss Percentage: -1.82%    Changes and Difficulties 2022   I have made the following changes to my diet since my last visit: Added more vegetables and whole grains: using   Cook Smart, Eat Well from [facility]   With regards to my diet, I am still struggling with: Sweet treats and quick meals   I have made the following changes to my activity/exercise since my last visit: No changes; 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         MEDICATIONS:   Current Outpatient Medications   Medication Sig Dispense Refill     Cholecalciferol (VITAMIN D3) 50 MCG ( UT) CAPS        clindamycin (CLINDAMAX) 1 % external gel Apply to AA QD 60 g 11     DULoxetine (CYMBALTA) 60 MG capsule TAKE ONE CAPSULE BY MOUTH ONCE DAILY 90 capsule 3     estradiol (VIVELLE-DOT) 0.05 MG/24HR bi-weekly patch Place 1 patch onto the skin twice a week 8 patch 3     ferrous sulfate (FEROSUL) 325 (65 Fe) MG tablet Take 1 tablet (325 mg) by mouth daily (with breakfast) 90 tablet 3     levothyroxine (SYNTHROID/LEVOTHROID) 137 MCG tablet Take 1 tablet (137 mcg) by mouth daily 90 tablet 3     progesterone  "(PROMETRIUM) 100 MG capsule Take 100 mg for 12 days, starting on day 14 of your cycle (period starts day 1). 90 capsule 0     Semaglutide-Weight Management (WEGOVY) 0.25 MG/0.5ML SOAJ Inject 0.25 mg Subcutaneous every 7 days 2 mL 0     Semaglutide-Weight Management (WEGOVY) 0.5 MG/0.5ML SOAJ Inject 0.5 mg Subcutaneous every 7 days 2 mL 1     Semaglutide-Weight Management (WEGOVY) 1 MG/0.5ML SOAJ Inject 1 mg Subcutaneous every 7 days 2 mL 0     traZODone (DESYREL) 50 MG tablet Take 1-2 tablets ( mg) by mouth nightly as needed for sleep 90 tablet 3     triamcinolone (KENALOG) 0.1 % external cream Apply to AA on the hands and arms BID x 1-2 week then PRN only 80 g 2       Weight Loss Medication History Reviewed With Patient 12/16/2022   Which weight loss medications are you currently taking on a regular basis?  None   If you are not taking a weight loss medication that was prescribed to you, please indicate why: Other   Are you having any side effects from the weight loss medication that we have prescribed you? Yes   If you are having side effects please describe: Nausea and constipation       PHYSICAL EXAM:  Objective    Ht 1.676 m (5' 6\")   Wt 127 kg (280 lb)   LMP 11/14/2022   BMI 45.19 kg/m      Vitals - Patient Reported  Pain Score: Mild Pain (3) (left knee pain)      Vitals:  No vitals were obtained today due to virtual visit.    GENERAL: Healthy, alert and no distress  EYES: Eyes grossly normal to inspection.  No discharge or erythema, or obvious scleral/conjunctival abnormalities.  RESP: No audible wheeze, cough, or visible cyanosis.  No visible retractions or increased work of breathing.    SKIN: Visible skin clear. No significant rash, abnormal pigmentation or lesions.  NEURO: Cranial nerves grossly intact.  Mentation and speech appropriate for age.  PSYCH: Mentation appears normal, affect normal/bright, judgement and insight intact, normal speech and appearance " well-groomed.        Sincerely,    Lisa Andre NP      21 minutes spent on the date of the encounter doing chart review, history and exam, documentation and further activities per the note

## 2022-12-22 NOTE — PROGRESS NOTES
Kaleigh Nam is a 47 year old who is being evaluated via a billable video visit.      How would you like to obtain your AVS? MyChart  If the video visit is dropped, the invitation should be resent by: Text to cell phone: [phone]  Will anyone else be joining your video visit? No  If patient encounters technical issues they should call [phone]    During this virtual visit the patient is located in [place], patient verifies this as the location during the entirety of this visit.     Video-Visit Details  Video Start Time: 0933    Type of service:  Video Visit    Video End Time:0948    Originating Location (pt. Location): Home  Distant Location (provider location):  [facility]  Platform used for Video Visit: Sharon Wong EMT-P 12/22/2022      8:51 AM

## 2022-12-22 NOTE — NURSING NOTE
"(   Chief Complaint   Patient presents with     Follow Up    )    ( Weight: 280 lb (pt reported) )  ( Height: 5' 6\" )  ( BMI (Calculated): 45.19 )  (   )  (   )  (   )  (   )  (   )  (   )    (   )  (   )  (   )  (   )  (   )  (   )  (   )    (   Patient Active Problem List   Diagnosis     CARDIOVASCULAR SCREENING; LDL GOAL LESS THAN 160     Hypothyroidism, postsurgical     Family history of diabetes mellitus     Class 3 severe obesity due to excess calories with serious comorbidity and body mass index (BMI) of 40.0 to 44.9 in adult (H)     Vitamin D deficiency     Recurrent major depressive disorder, in full remission (H)     Restless legs syndrome     Allergic rhinitis     Atypical depressive disorder     Psychophysiological insomnia     PTSD (post-traumatic stress disorder)     Menopausal syndrome (hot flashes)    )  (   Current Outpatient Medications   Medication Sig Dispense Refill     Cholecalciferol (VITAMIN D3) 50 MCG (2000 UT) CAPS        clindamycin (CLINDAMAX) 1 % external gel Apply to AA QD 60 g 11     DULoxetine (CYMBALTA) 60 MG capsule TAKE ONE CAPSULE BY MOUTH ONCE DAILY 90 capsule 3     estradiol (VIVELLE-DOT) 0.05 MG/24HR bi-weekly patch Place 1 patch onto the skin twice a week 8 patch 3     ferrous sulfate (FEROSUL) 325 (65 Fe) MG tablet Take 1 tablet (325 mg) by mouth daily (with breakfast) 90 tablet 3     levothyroxine (SYNTHROID/LEVOTHROID) 137 MCG tablet Take 1 tablet (137 mcg) by mouth daily 90 tablet 3     progesterone (PROMETRIUM) 100 MG capsule Take 100 mg for 12 days, starting on day 14 of your cycle (period starts day 1). 90 capsule 0     traZODone (DESYREL) 50 MG tablet Take 1-2 tablets ( mg) by mouth nightly as needed for sleep 90 tablet 3     triamcinolone (KENALOG) 0.1 % external cream Apply to AA on the hands and arms BID x 1-2 week then PRN only 80 g 2    )  ( Diabetes Eval:    )    ( Pain Eval:  Mild Pain (3) )    ( Wound Eval:       )    (   History   Smoking Status     " Never   Smokeless Tobacco     Never    )    ( Signed By:  Jonathan Wong, EMT; December 22, 2022; 8:51 AM )

## 2022-12-27 ENCOUNTER — TELEPHONE (OUTPATIENT)
Dept: ENDOCRINOLOGY | Facility: CLINIC | Age: 47
End: 2022-12-27

## 2022-12-27 NOTE — TELEPHONE ENCOUNTER
Davi and sent [employer] for scheduling return in about 3 months (around 3/22/2023) with Lisa Andre. Also schedule an RD visit.

## 2023-01-09 ENCOUNTER — TELEPHONE (OUTPATIENT)
Dept: GASTROENTEROLOGY | Facility: CLINIC | Age: 48
End: 2023-01-09

## 2023-01-09 ENCOUNTER — VIRTUAL VISIT (OUTPATIENT)
Dept: PEDIATRICS | Facility: CLINIC | Age: 48
End: 2023-01-09
Payer: COMMERCIAL

## 2023-01-09 DIAGNOSIS — Z79.890 HORMONE REPLACEMENT THERAPY (HRT): Primary | ICD-10-CM

## 2023-01-09 DIAGNOSIS — N95.1 MENOPAUSAL SYNDROME (HOT FLASHES): ICD-10-CM

## 2023-01-09 PROCEDURE — 99213 OFFICE O/P EST LOW 20 MIN: CPT | Mod: GT | Performed by: INTERNAL MEDICINE

## 2023-01-09 NOTE — PROGRESS NOTES
"Carin is a 47 year old who is being evaluated via a billable video visit.      Assessment & Plan       ICD-10-CM    1. Hormone replacement therapy (HRT) - started 12/2022  Z79.890       2. Menopausal syndrome (hot flashes)  N95.1         --------------------------  PATIENT INSTRUCTIONS    Patient Instructions   Great to see you - I'm glad it's been helpful for the hot flashes and headaches.     Can watch the bleeding for now - not uncommon when starting any kind of hormonal therapy.     If the bleeding stops, things are going well, etc -> send me a MyChart update in 6-8 weeks.     If the bleeding continues to be on/off, or you think we need to make changes -> send me an evisit in 6-8 weeks.     If the bleeding doesn't stop, becomes heavy, etc -> send me a message in 2 weeks and we may need to do a visit.      --------------------------               BMI:   Estimated body mass index is 45.19 kg/m  as calculated from the following:    Height as of 12/22/22: 1.676 m (5' 6\").    Weight as of 12/22/22: 127 kg (280 lb).           No follow-ups on file.    Dusty Bang MD  [facility] HAWA Del Rosario is a 47 year old, presenting for the following health issues:  No chief complaint on file.      History of Present Illness       Reason for visit:  HRT checkin    She eats 2-3 servings of fruits and vegetables daily.She consumes 0 sweetened beverage(s) daily.She exercises with enough effort to increase her heart rate 10 to 19 minutes per day.  She exercises with enough effort to increase her heart rate 3 or less days per week.   She is taking medications regularly.       F/up HRT  Per 12/9/22  1 - start hormone replacement therapy (HRT). I think you'd be a good person to try this. The people who are not good candidates are older than 60 or past 10 years of menopause, have a history of breast cancer/heart disease/stroke/or clot, or active liver disease). May also help with headaches and " vaginal dryness.  --- transdermal estrogen with a patch (lower risk of clot than in pill form)  --- would need to take progesterone pill 12 days a month to protect the uterus (day 1 is when your period starts, take the progesterone starting on day 14).  ----- you are likely to have monthly withdrawal bleeding. As you get further into menopause we can consider changing to continuous estrogen-progestin therapy.      As we talked about we generally treat short term - usually not more than five years or not beyond age 60 years. Need to stay on top of mammograms (like you do!).    Had way less night sweats and headaches. Only had 1 headache vs 1-3 a week, woke up one time. Maybe has a little more energy.    Has been bleeding for 10 days - started off as spotting and bonny colored and turned into a period. Supposed to get period this week. Light to medium and not crampy.     Back into work and made it through. Manager was good and called her ahead of time and went in for an office day.     Review of Systems         Objective           Vitals:  No vitals were obtained today due to virtual visit.    Physical Exam   GENERAL: Healthy, alert and no distress  EYES: Eyes grossly normal to inspection.  No discharge or erythema, or obvious scleral/conjunctival abnormalities.  RESP: No audible wheeze, cough, or visible cyanosis.  No visible retractions or increased work of breathing.    SKIN: Visible skin clear. No significant rash, abnormal pigmentation or lesions.  NEURO: Cranial nerves grossly intact.  Mentation and speech appropriate for age.  PSYCH: Mentation appears normal, affect normal/bright, judgement and insight intact, normal speech and appearance well-groomed.            Video-Visit Details    Type of service:  Video Visit     Originating Location (pt. Location): Home    Distant Location (provider location):  On-site  Platform used for Video Visit: [employer]

## 2023-01-09 NOTE — PATIENT INSTRUCTIONS
Great to see you - I'm glad it's been helpful for the hot flashes and headaches.     Can watch the bleeding for now - not uncommon when starting any kind of hormonal therapy.     If the bleeding stops, things are going well, etc -> send me a MyChart update in 6-8 weeks.     If the bleeding continues to be on/off, or you think we need to make changes -> send me an evisit in 6-8 weeks.     If the bleeding doesn't stop, becomes heavy, etc -> send me a message in 2 weeks and we may need to do a visit.

## 2023-01-09 NOTE — TELEPHONE ENCOUNTER
Screening Questions  BLUE  KIND OF PREP RED  LOCATION [review exclusion criteria] GREEN  SEDATION TYPE        Y Are you active on mychart?   Dusty Bang MD      Ordering/Referring Provider?    [facility]     What type of coverage do you have?      N Have you had a positive covid test in the last 14 days?     45.2 1. BMI  [BMI 40+ - review exclusion criteria]    Y  2. Are you able to give consent for your medical care? [IF NO,RN REVIEW]          N  3. Are you taking any prescription pain medications on a routine schedule   (ex narcotics: tramadol, oxycodone, roxicodone, oxycontin,  and percocet)?        NA  3a. EXTENDED PREP What kind of prescription?     N 4. Do you have any chemical dependencies such as alcohol, street drugs, or methadone?        **If yes 3- 5 , please schedule with MAC sedation.**          IF YES TO ANY 6 - 10 - HOSPITAL SETTING ONLY.     N 6.   Do you need assistance transferring?     N 7.   Have you had a heart or lung transplant?    N 8.   Are you currently on dialysis?   N 9.   Do you use daily home oxygen?   N 10. Do you take nitroglycerin?   10a. N If yes, how often?     11. [FEMALES]  N Are you currently pregnant?    11a. NA If yes, how many weeks? [ Greater than 12 weeks, OR NEEDED]    N 12. Do you have Pulmonary Hypertension? *NEED PAC APPT AT [facility]*     N 13. [review exclusion criteria]  Do you have any implantable devices in your body (pacemaker, defib, LVAD)?    N 14. In the past 6 months, have you had any heart related issues including cardiomyopathy or heart attack?     14a. N If yes, did it require cardiac stenting if so when?     N 15. Have you had a stroke or Transient ischemic attack (TIA - aka  mini stroke ) within 6 months?      N 16. Do you have mod to severe Obstructive Sleep Apnea?  [Hospital only]    N 17. Do you have SEVERE AND UNCONTROLLED asthma? *NEED PAC APPT AT [facility]*     N 18. Are you currently taking any blood thinners?     18a. If yes, inform patient to  "\"follow up w/ ordering provider for bridging instructions.\"    N 19. Do you take the medication Phentermine?    19a. If yes, \"Hold for 7 days before procedure.  Please consult your prescribing provider if you have questions about holding this medication.\"     N  20. Do you have chronic kidney disease?      N  21. Do you have a diagnosis of diabetes?     N  22. On a regular basis do you go 3-5 days between bowel movements?      23. Preferred LOCAL Pharmacy for Pre Prescription    [ LIST ONLY ONE PHARMACY]        [facility] - [address]      - CLOSING REMINDERS -    Informed patient they will need an adult    Cannot take any type of public or medical transportation alone    Conscious Sedation- Needs  for 6 hours after the procedure       MAC/General-Needs  for 24 hours after procedure    Pre-Procedure Covid test to be completed [[facility] PCR Testing Required]    Confirmed Nurse will call to complete assessment       Wants to go to [facility] to be closer to [place].               "

## 2023-01-11 ENCOUNTER — VIRTUAL VISIT (OUTPATIENT)
Dept: ENDOCRINOLOGY | Facility: CLINIC | Age: 48
End: 2023-01-11
Payer: COMMERCIAL

## 2023-01-11 DIAGNOSIS — Z71.3 NUTRITIONAL COUNSELING: Primary | ICD-10-CM

## 2023-01-11 DIAGNOSIS — E66.9 OBESITY: ICD-10-CM

## 2023-01-11 PROCEDURE — 97802 MEDICAL NUTRITION INDIV IN: CPT | Mod: GT | Performed by: DIETITIAN, REGISTERED

## 2023-01-11 NOTE — LETTER
"1/11/2023       RE: Kaleigh Nam  [address] [address]     Dear Colleague,    Thank you for referring your patient, Kaleihg Nam, to the [facility] [place] at [facility]. Please see a copy of my visit note below.    Kaleigh Nam is a 46 year old female who is being evaluated via a billable video visit.      The patient has been notified of following:     \"This video visit will be conducted via a call between you and your physician/provider. We have found that certain health care needs can be provided without the need for an in-person physical exam.  This service lets us provide the care you need with a video conversation.  If a prescription is necessary we can send it directly to your pharmacy.  If lab work is needed we can place an order for that and you can then stop by our lab to have the test done at a later time.    Video visits are billed at different rates depending on your insurance coverage.  Please reach out to your insurance provider with any questions.    If during the course of the call the physician/provider feels a video visit is not appropriate, you will not be charged for this service.\"    Patient has given verbal consent for Video visit? Yes  How would you like to obtain your AVS? MyChart  If you are dropped from the video visit, the video invite should be resent to: Text to cell phone: [phone]  Will anyone else be joining your video visit? No  {If patient encounters technical issues they should call [phone]      Video-Visit Details    Type of service:  Video Visit    Video Start Time: 11:30 AM  Video End Time: 11:46 AM    Originating Location (pt. Location): Home    Distant Location (provider location):  [facility] [place]     Platform used for Video Visit: "[employer]"    During this virtual visit the patient is located in [place], patient " "verifies this as the location during the entirety of this visit.     Return Weight Management Nutrition Consultation    Kaleigh Nam is a 46 year old female presents today for new weight management nutrition consultation.  Patient referred by Lisa Andre NP on June 23, 2021.    Patient with Co-morbidities of obesity including:  Type II DM no (pre-diabetes)  Renal Failure no  Sleep apnea no  Hypertension no   Dyslipidemia no  Joint pain yes  Back pain yes  GERD yes     PMHx: Hypothyroidism (Levothyroxine)    Anthropometrics:  Estimated body mass index is 45.19 kg/m  as calculated from the following:    Height as of 12/22/22: 1.676 m (5' 6\").    Weight as of 12/22/22: 127 kg (280 lb).     Current weight: 280 lbs     Medications for Weight Loss:  Wegovy    NUTRITION HISTORY  See MD note for details.    Pt reports decreased/controlled appetite taking Wegovy. Motivated to make changes to promote a healthy lifestyle for the whole family.  Working on implementing breakfast and healthy breakfast choices (when going to work: hard-boiled eggs + fruit).    1/11/23: Pt last saw Hailey Mcneil RD, LD 8/10/21. States she is struggling with finding fast and healthy options. Has started incorporating more fruits and vegetables. Has ice cream after dinner most nights. Wife is gluten free and kids tend to be picky eaters which makes family meals difficult.    Recent food recall:  Breakfast: coffee, almond pastry  Lunch: broccoli cheddar soup, multigrain crackers/chips  Dinner: Ordered Applesbees (shared: Ribs, some fries, Asian cx salad, fried chicken tenders, artichoke dip)  Snacks: cheese and apple, coffee and cookie, greek yogurt,   Beverages: coffee (morning/afternoon with half and half, going to work caramel Sarsysiato with skim/lite syrups), no soda, flavored soda water, body armor occ  Alcohol: rarely (once/month)  Dining out: take out once/twice a week    Physical Activity:  Walking 2 x week  Limited d/t pain (h/o torn " "meniscus and more challenging with weight gain)    Progress on Previous Goals  1) Food Journal/ Meal Log (phone apps: Mamaya, MyFitnessPal, Lose-it)   - identifying themes (times of day, specific foods, portions, emotions etc)    2). Eat slowly (20-30 minutes per meal), chewing foods well (25 chews per bite/applesauce consistency)   - taking smaller bites, setting down utensils, pausing and checking in with satiety cues, stopping when feeling satisfied    3). 9\" Plate method (1/2 plate non-starchy vegetables/fruit, 1/4 plate lean protein, 1/4 plate whole grain starch - no more than 1/2 cup carb/meal) - see handout below    4). Avoid/reduce calorie-containing beverages    5). Consume 64 oz fluid/day (increase on active days)   - track fluid intake, carry water bottle, reminders to stay hydrated    6). Increase activity as able      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  1500 calories/day (per BEE or ~1900 alexia/day and loss of 1 lb per week w/o exercise)  Volumetrics/Plate Method    Nutrition Diagnosis  Food and nutrition related knowledge deficit r/t lack of prior exposure to calorie counting and nutrition education aeb pt unable to verbalize understanding of volumetrics diet for weight loss.  and  Obesity r/t long history of self-monitoring deficit and excessive energy intake aeb BMI >30.    Nutrition Intervention  Materials/education provided on Volumetric eating to help satiety level on fewer calories; portion control and healthy food choices (Plate Method and Volumetrics handouts), 100 calorie snack choices, meal and snack planning and websites, sample meal plans     Patient demonstrates understanding.    Expected Engagement: good  Follow-Up Plans: meal planning, calorie tracking, stress/boredom eating    Nutrition Goals  1) Quick and easy meal options: " https://www.Nu[employer]/collection/easy-dinner-ideas/   -gluten free: https://www.[employer]/collection/gluten-free-dinner-recipes/  2) Meal prepping tips: https://www.ecobee/healthyeats/healthy-tips/2019/10/meal-prep-tips-hacks-experts  3) Menopause nutrition: https://health.[facility].org/menopause-diet/    Low Calorie Frozen Meal:  Healthy Choice Power Bowls  Lean Cuisine  Smart Ones  Job Mikal [facility]  [employer] Family Dinners     Meal Replacement Shake Options:   *Protein Shake Criteria: no more than 210 Calories, at least 20 grams of protein, and less than 10 grams of sugar   [facility] smoothie (160 Calories, 20 g protein)   Premier Protein (160 Calories, 30 g protein)  Slim Fast Advanced Nutrition (180 Calories, 20 g protein)  Muscle Milk, lactose-free, 17 oz bottle (210 Calories, 30 g protein)  Integrated Supplements, no artificial sugars (110 Calories, 20 g protein)  Genepro, unflavored protein powder (60 Calories, 30 g protein)  Boost/Ensure Max (160 calories, 30 gm protein)   [facility] Core Power (170 calories, 26 gm protein)  Aldi's Elevation Protein Powder (180 calories, 30 gm protein)     The Plate Method  [email or web address]    Protein Sources   [email or web address]     Carbohydrates  [email or web address]     Mindful Eating  [email or web address]     Summary of Volumetrics Eating Plan  [email or web address]       Follow-Up:  1 month or PRN    Time spent with patient: 16 minutes.  Leslie Borja RD, LD

## 2023-01-11 NOTE — PROGRESS NOTES
"Kaleigh Nam is a 46 year old female who is being evaluated via a billable video visit.      The patient has been notified of following:     \"This video visit will be conducted via a call between you and your physician/provider. We have found that certain health care needs can be provided without the need for an in-person physical exam.  This service lets us provide the care you need with a video conversation.  If a prescription is necessary we can send it directly to your pharmacy.  If lab work is needed we can place an order for that and you can then stop by our lab to have the test done at a later time.    Video visits are billed at different rates depending on your insurance coverage.  Please reach out to your insurance provider with any questions.    If during the course of the call the physician/provider feels a video visit is not appropriate, you will not be charged for this service.\"    Patient has given verbal consent for Video visit? Yes  How would you like to obtain your AVS? MyChart  If you are dropped from the video visit, the video invite should be resent to: Text to cell phone: [phone]  Will anyone else be joining your video visit? No  {If patient encounters technical issues they should call [phone]      Video-Visit Details    Type of service:  Video Visit    Video Start Time: 11:30 AM  Video End Time: 11:46 AM    Originating Location (pt. Location): Home    Distant Location (provider location):  [facility] [place]     Platform used for Video Visit: [employer]    During this virtual visit the patient is located in [place], patient verifies this as the location during the entirety of this visit.     Return Weight Management Nutrition Consultation    Kaleigh Nam is a 46 year old female presents today for new weight management nutrition consultation.  Patient referred by Lisa Andre NP on June 23, 2021.    Patient with Co-morbidities of obesity " "including:  Type II DM no (pre-diabetes)  Renal Failure no  Sleep apnea no  Hypertension no   Dyslipidemia no  Joint pain yes  Back pain yes  GERD yes     PMHx: Hypothyroidism (Levothyroxine)    Anthropometrics:  Estimated body mass index is 45.19 kg/m  as calculated from the following:    Height as of 12/22/22: 1.676 m (5' 6\").    Weight as of 12/22/22: 127 kg (280 lb).     Current weight: 280 lbs     Medications for Weight Loss:  Wegovy    NUTRITION HISTORY  See MD note for details.    Pt reports decreased/controlled appetite taking Wegovy. Motivated to make changes to promote a healthy lifestyle for the whole family.  Working on implementing breakfast and healthy breakfast choices (when going to work: hard-boiled eggs + fruit).    1/11/23: Pt last saw Hailey Mcneil RD, LD 8/10/21. States she is struggling with finding fast and healthy options. Has started incorporating more fruits and vegetables. Has ice cream after dinner most nights. Wife is gluten free and kids tend to be picky eaters which makes family meals difficult.    Recent food recall:  Breakfast: coffee, almond pastry  Lunch: broccoli cheddar soup, multigrain crackers/chips  Dinner: Ordered Applesbees (shared: Ribs, some fries, Asian cx salad, fried chicken tenders, artichoke dip)  Snacks: cheese and apple, coffee and cookie, greek yogurt,   Beverages: coffee (morning/afternoon with half and half, going to work caramel machiato with skim/lite syrups), no soda, flavored soda water, body armor occ  Alcohol: rarely (once/month)  Dining out: take out once/twice a week    Physical Activity:  Walking 2 x week  Limited d/t pain (h/o torn meniscus and more challenging with weight gain)    Progress on Previous Goals  1) Food Journal/ Meal Log (phone apps: Wild Pockets, MyFitnessPal, Lose-it)   - identifying themes (times of day, specific foods, portions, emotions etc)    2). Eat slowly (20-30 minutes per meal), chewing foods well (25 chews per bite/applesauce " "consistency)   - taking smaller bites, setting down utensils, pausing and checking in with satiety cues, stopping when feeling satisfied    3). 9\" Plate method (1/2 plate non-starchy vegetables/fruit, 1/4 plate lean protein, 1/4 plate whole grain starch - no more than 1/2 cup carb/meal) - see handout below    4). Avoid/reduce calorie-containing beverages    5). Consume 64 oz fluid/day (increase on active days)   - track fluid intake, carry water bottle, reminders to stay hydrated    6). Increase activity as able      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  1500 calories/day (per BEE or ~1900 alexia/day and loss of 1 lb per week w/o exercise)  Volumetrics/Plate Method    Nutrition Diagnosis  Food and nutrition related knowledge deficit r/t lack of prior exposure to calorie counting and nutrition education aeb pt unable to verbalize understanding of volumetrics diet for weight loss.  and  Obesity r/t long history of self-monitoring deficit and excessive energy intake aeb BMI >30.    Nutrition Intervention  Materials/education provided on Volumetric eating to help satiety level on fewer calories; portion control and healthy food choices (Plate Method and Volumetrics handouts), 100 calorie snack choices, meal and snack planning and websites, sample meal plans     Patient demonstrates understanding.    Expected Engagement: good  Follow-Up Plans: meal planning, calorie tracking, stress/boredom eating    Nutrition Goals  1) Quick and easy meal options: https://www.Fusemachines/collection/easy-dinner-ideas/   -gluten free: https://www.Fusemachines/collection/gluten-free-dinner-recipes/  2) Meal prepping tips: https://www.[employer]com/healthyeats/healthy-tips/2019/10/meal-prep-tips-hacks-experts  3) Menopause nutrition: https://health.[facility].org/menopause-diet/    Low Calorie Frozen " Meal:  Healthy Choice Power Bowls  Lean Cuisine  Smart Ones  Job Mikal [employer] Family Dinners     Meal Replacement Shake Options:   *Protein Shake Criteria: no more than 210 Calories, at least 20 grams of protein, and less than 10 grams of sugar   [facility] smoothie (160 Calories, 20 g protein)   Premier Protein (160 Calories, 30 g protein)  Slim Fast Advanced Nutrition (180 Calories, 20 g protein)  Muscle Milk, lactose-free, 17 oz bottle (210 Calories, 30 g protein)  Integrated Supplements, no artificial sugars (110 Calories, 20 g protein)  Genepro, unflavored protein powder (60 Calories, 30 g protein)  Boost/Ensure Max (160 calories, 30 gm protein)   [employer] Core Power (170 calories, 26 gm protein)  Aldi's Elevation Protein Powder (180 calories, 30 gm protein)     The Plate Method  [email or web address]    Protein Sources   [email or web address]     Carbohydrates  [email or web address]     Mindful Eating  [email or web address]     Summary of Volumetrics Eating Plan  [email or web address]       Follow-Up:  1 month or PRN    Time spent with patient: 16 minutes.  Leslie Borja RD, LD

## 2023-01-12 ENCOUNTER — ANCILLARY PROCEDURE (OUTPATIENT)
Dept: MAMMOGRAPHY | Facility: CLINIC | Age: 48
End: 2023-01-12
Attending: INTERNAL MEDICINE
Payer: COMMERCIAL

## 2023-01-12 DIAGNOSIS — Z12.31 ENCOUNTER FOR SCREENING MAMMOGRAM FOR BREAST CANCER: ICD-10-CM

## 2023-01-12 PROCEDURE — 77063 BREAST TOMOSYNTHESIS BI: CPT | Mod: TC | Performed by: RADIOLOGY

## 2023-01-12 PROCEDURE — 77067 SCR MAMMO BI INCL CAD: CPT | Mod: TC | Performed by: RADIOLOGY

## 2023-01-12 NOTE — PATIENT INSTRUCTIONS
Dimitry Del Rosario!    Follow-up with RD in 2 months    Thank you,    Leslie Borja RD, LD  If you would like to schedule or reschedule an appointment with the RD, please call [phone]    Nutrition Goals  1) Quick and easy meal options: [email or web address]://[email or web address]-dinner-[email or web address]/   -gluten free: https://www.Interview/collection/gluten-free-dinner-recipes/  2) Meal prepping tips: https://www.[employer]/healthyeats/healthy-tips/2019/10/meal-prep-tips-hacks-experts  3) Menopause nutrition: https://health.[facility].org/menopause-diet/    Low Calorie Frozen Meal:  Healthy Choice Power Bowls  Lean Cuisine  Smart Ones  [facility]  [employer] Family Dinners     Meal Replacement Shake Options:   *Protein Shake Criteria: no more than 210 Calories, at least 20 grams of protein, and less than 10 grams of sugar   [facility] smoothie (160 Calories, 20 g protein)   Premier Protein (160 Calories, 30 g protein)  Slim Fast Advanced Nutrition (180 Calories, 20 g protein)  Muscle Milk, lactose-free, 17 oz bottle (210 Calories, 30 g protein)  Integrated Supplements, no artificial sugars (110 Calories, 20 g protein)  Genepro, unflavored protein powder (60 Calories, 30 g protein)  Boost/Ensure Max (160 calories, 30 gm protein)   [facility] Core Power (170 calories, 26 gm protein)  Aldi's Elevation Protein Powder (180 calories, 30 gm protein)     The Plate Method  [email or web address]    Protein Sources   [email or web address]     Carbohydrates  [email or web address]     Mindful Eating  [email or web address]     Summary of Volumetrics Eating Plan  [email or web address]     Interested in working with a health ? Health coaches work with you to improve your overall health and wellbeing. They look at the whole person, and may involve discussion of different areas of life, including, but not limited to the four pillars of health (sleep, exercise, nutrition, and  stress management). Discuss with your care team if you would like to start working a health .    Health Coaching-3 Pack:    $99 for three health coaching visits    Visits may be done in person or via phone    Coaching is a partnership between the  and the client; Coaches do not prescribe or diagnose    Coaching helps inspire the client to reach his/her personal goals    COMPREHENSIVE WEIGHT MANAGEMENT PROGRAM  VIRTUAL SUPPORT GROUPS    For Support Group Information:      We offer support groups for patients who are working on weight loss and considering, preparing for or have had weight loss surgery.   There is no cost for this opportunity.  You are invited to attend the?Virtual Support Groups?provided by any of the following locations:    [facility] via Microsoft Teams with Debi Bonilla RN  2.   [place] via Wins[employer] with Jesus Hahn, PhD, LP  3.   [place] via Winshuttle with Kimmie Monk RN  4.   [facility] via Microsoft Teams with Kimmie Suero [facility]    The following Support Group information can also be found on our website:  [email or web address]-support-groups      [facility] Weight Loss Surgery Support Group    [facility] Weight Loss Surgery Support Group  The support group is a patient-lead forum that meets monthly to share experiences, encouragement and education. It is open to those who have had weight loss surgery, are scheduled for surgery, and those who are considering surgery.   WHEN: This group meets on the 3rd Wednesday of each month from 5:00PM - 6:00PM virtually using Microsoft Teams.   FACILITATOR: Led by Debi Bonilla RD, LD, RN, the program's Clinical Coordinator.   TO REGISTER: Please contact the clinic via [employer] or call the nurse line directly at [phone] to inform our staff that you would like an invite sent to you and to let us know the email you would like the invite sent to. Prior  "to the meeting, a link with directions on how to join the meeting will be sent to you.    2022 Meetings  January 19: \"Let's Talk\" a time for the group to share.  February 16: \"Let's Talk\" a time for the group to share.  March 16: Guest Speakers: Psychologists, Cat Lee, PhD,LP and Joya Linares PsyD,  April 20: Guest Speaker: Health , Ana Justin, CHC,CHES, CPT  May 18: Guest Speaker: DietitianLewis, CARMITA, LP  [place] 15: \"Let's Talk\" a time for the group to share.  July 20: \"Let's Talk\" a time for the group to share.  August 17: TBA  September 21: TBA  October 19: Guest Speaker: Dr Jc Small MD Pulmonologist and Sleep Medicine Physician, \"Getting a Good Night's Sleep\".  November 16: TBA  December 21: TBA    [facility] Support Groups    Connections: Bariatric Care Support Group?  This is open to all [facility] (and those external to this program) pre- and post- operative bariatric surgery patients as well as their support system.   WHEN: This group meets the 2nd Tuesday of each month from 6:30 PM - 8:00 PM virtually using Microsoft Teams.   FACILITATOR: Led by Jesus Hahn, Ph.D who is a Licensed Psychologist with the [facility] Comprehensive Weight Management Program.   TO REGISTER: Please send an email to Jesus Hahn, Ph.D.,  [email or web address]?if you would like an invitation to the group and to learn about using Microsoft Teams.    2022 Meetings  January 11: Gianna Miller, PharmD, Pharmacy Resident at [facility], \"Medications and Bariatric Surgery\".  February 8: Open Forum  March 8  April 12  May 10  [place] 14    Connections: Post-Operative Bariatric Surgery Support Group  This is a support group for [facility] bariatric patients (and those external to [facility]) who have had bariatric surgery and are at least 3 months post-surgery.  WHEN: This support group meets the 4th Wednesday of the month from " "11:00 AM - 12:00 PM virtually using Microsoft Teams.   FACILITATOR: Led by Certified Bariatric Nurse, Kimmie Monk RN.   TO REGISTER: Please send an email to Kimmie at [email or web address] if you would like an invitation to the group and to learn about using Microsoft Teams.    2022 Meetings  January 26  February 23  March 23  April 27  May 25  [place] 22    [facility] Healthy Lifestyle Virtual Support Group    Healthy Lifestyle Virtual Support Group?  This is 60 minutes of small group guided discussion, support and resources. All are welcome who want a healthy lifestyle.  WHEN: This group meets monthly on a Friday from 12:30 PM - 1:30 PM virtually using Microsoft Teams.   FACILITATOR: Led by National Board Certified Health and , Kimmie Suero [facility].   TO REGISTER: Please send an email to Kimmie [email or web address] to receive monthly invites to the group or if you have any questions about having a health .  Prior to the meeting, a link with directions on how to join the meeting will be sent to you.    2022 Meetings  January 21: Kerry Gilliam MS, RN, CIC, CBN, \"Healthy Habits\"  February 25: Open Forum  March 18: \"Setting Limits and Boundaries\"  April 29: Shaista Diallo RD, \"Meal Planning Made Easy\"  May 20: Open Forum  June: To be determined                "

## 2023-02-16 ENCOUNTER — TELEPHONE (OUTPATIENT)
Dept: ENDOCRINOLOGY | Facility: CLINIC | Age: 48
End: 2023-02-16
Payer: COMMERCIAL

## 2023-02-16 NOTE — TELEPHONE ENCOUNTER
Contributing Nutrition Diagnosis  Inadequate oral intake    Related to (etiology):   Altered GI function- pending bowel return    Signs and Symptoms (as evidenced by):   NPO and no other means of nutrition      Interventions/Recommendations (treatment strategy):  Collaboraiton with other healthcare providers  Clear/ regular diet   PPN    Nutrition Diagnosis Status:   Continues     Per test claim **Wegovy** needs prior authorization.   Insurance is as follows:  BIN: [id number]  PCN: [id number]  ID: [id number]  Group:   Please submit for pa approval. Once approved or denied, please include liaison on communication.

## 2023-02-16 NOTE — TELEPHONE ENCOUNTER
Prior Authorization Retail Medication Request     Medication/Dose: Wegovy  ICD code (if different than what is on RX):  Class 3 severe obesity due to excess calories with serious comorbidity and body mass index (BMI) of 40.0 to 44.9 in adult (H) [E66.01, Z68.41]  - Primary      Previously Tried and Failed:  History of diet and exercise  Rationale:  I had the pleasure of seeing your patient Kaleigh Nam. She is a 47 year old female who I am continuing to see for treatment of obesity related to: Pre-Diabetes, GERD (Reflux), Stress Incontinence, Osteoarthritis (joint disease), Hypothyroidism.      Would like to retry wegovy- beneficial historically. Will restart taper up.      Anti-obesity medications:      Current:   Wegovy stopped this summer - ready to start focus on weight management again      Recent diet changes:   Working on increasing veggies   Using [facility] cook book      Recent exercise/activity changes: foot issues last seen, was having worse knee pain recent (arthritis)    Helpful to keep moving   Lots of standing at work - plans to find good stable shoes for work   Starting physical therapy      CURRENT WEIGHT:   280 lbs 0 oz     Initial Weight (lbs): 275 lbs  Last Visits Weight: 128.8 kg (284 lb)  Cumulative weight loss (lbs): -5  Weight Loss Percentage: -1.82%     Insurance Name:    Insurance ID:          Pharmacy Information (if different than what is on RX)  Name:  [facility] - [id number]   Phone:  [phone]

## 2023-02-21 NOTE — TELEPHONE ENCOUNTER
Prior Authorization Approval    Authorization Effective Date: 2/21/2023  Authorization Expiration Date: 5/20/2023  Medication: Semaglutide-Weight Management (WEGOVY) 0.25 MG/0.5ML SOAJ--APPROVED  Approved Dose/Quantity:   Reference #:     Insurance Company: [employer] - Phone [phone] Fax [phone]  Expected CoPay:       CoPay Card Available:      Foundation Assistance Needed:    Which Pharmacy is filling the prescription (Not needed for infusion/clinic administered): [facility] [place], [place] - [address]   Pharmacy Notified: Yes  Patient Notified: Yes **Instructed pharmacy to notify patient when script is ready to /ship.**

## 2023-02-21 NOTE — TELEPHONE ENCOUNTER
PA Initiation    Medication: Semaglutide-Weight Management (WEGOVY) 0.25 MG/0.5ML SOAJ   Insurance Company: [employer] - Phone [phone] Fax [phone]  Pharmacy Filling the Rx: [facility] - [address]   Filling Pharmacy Phone: [phone]  Filling Pharmacy Fax: [phone]  Start Date: 2/20/2023

## 2023-03-02 VITALS — WEIGHT: 275 LBS | BODY MASS INDEX: 44.2 KG/M2 | HEIGHT: 66 IN

## 2023-03-02 ASSESSMENT — PAIN SCALES - GENERAL: PAINLEVEL: NO PAIN (0)

## 2023-03-02 NOTE — NURSING NOTE
"(   Chief Complaint   Patient presents with     Follow Up    )    ( Weight: 275 lb )  ( Height: 5' 6\" )  ( BMI (Calculated): 44.39 )  (   )  (   )  (   )  (   )  (   )  (   )    (   )  (   )  (   )  (   )  (   )  (   )  (   )    (   Patient Active Problem List   Diagnosis     CARDIOVASCULAR SCREENING; LDL GOAL LESS THAN 160     Hypothyroidism, postsurgical     Family history of diabetes mellitus     Class 3 severe obesity due to excess calories with serious comorbidity and body mass index (BMI) of 40.0 to 44.9 in adult (H)     Vitamin D deficiency     Recurrent major depressive disorder, in full remission (H)     Restless legs syndrome     Allergic rhinitis     Atypical depressive disorder     Psychophysiological insomnia     PTSD (post-traumatic stress disorder)     Menopausal syndrome (hot flashes)     Hormone replacement therapy (HRT) - started 12/2022    )  (   Current Outpatient Medications   Medication Sig Dispense Refill     Cholecalciferol (VITAMIN D3) 50 MCG (2000 UT) CAPS        clindamycin (CLINDAMAX) 1 % external gel Apply to AA QD 60 g 11     DULoxetine (CYMBALTA) 60 MG capsule TAKE ONE CAPSULE BY MOUTH ONCE DAILY 90 capsule 3     estradiol (VIVELLE-DOT) 0.05 MG/24HR bi-weekly patch Place 1 patch onto the skin twice a week 8 patch 3     ferrous sulfate (FEROSUL) 325 (65 Fe) MG tablet Take 1 tablet (325 mg) by mouth daily (with breakfast) 90 tablet 3     levothyroxine (SYNTHROID/LEVOTHROID) 137 MCG tablet Take 1 tablet (137 mcg) by mouth daily 90 tablet 3     progesterone (PROMETRIUM) 100 MG capsule Take 100 mg for 12 days, starting on day 14 of your cycle (period starts day 1). 90 capsule 0     Semaglutide-Weight Management (WEGOVY) 0.25 MG/0.5ML SOAJ Inject 0.25 mg Subcutaneous every 7 days 2 mL 0     Semaglutide-Weight Management (WEGOVY) 0.5 MG/0.5ML SOAJ Inject 0.5 mg Subcutaneous every 7 days 2 mL 1     Semaglutide-Weight Management (WEGOVY) 1 MG/0.5ML SOAJ Inject 1 mg Subcutaneous every 7 days 2 " mL 0     traZODone (DESYREL) 50 MG tablet Take 1-2 tablets ( mg) by mouth nightly as needed for sleep 90 tablet 3     triamcinolone (KENALOG) 0.1 % external cream Apply to AA on the hands and arms BID x 1-2 week then PRN only 80 g 2    )  ( Diabetes Eval:    )    ( Pain Eval:  No Pain (0) )    ( Wound Eval:       )    (   History   Smoking Status     Never   Smokeless Tobacco     Never    )    ( Signed By:  Kayce Bryant; March 2, 2023; 3:32 PM )

## 2023-03-02 NOTE — PROGRESS NOTES
Kaleigh Nam is a 48 year old who is being evaluated via a billable video visit.      How would you like to obtain your AVS? MyChart  If the video visit is dropped, the invitation should be resent by: Text to cell phone: [phone]  Will anyone else be joining your video visit? No  If patient encounters technical issues they should call [phone]    During this virtual visit the patient is located in [place], patient verifies this as the location during the entirety of this visit.     Video-Visit Details  Video Start Time: 0702    Type of service:  Video Visit    Video End Time:0708    Originating Location (pt. Location): Home  Distant Location (provider location):  [facility]  Platform used for Video Visit: Sharon Bryant CMA  3/2/2023      3:32 PM

## 2023-03-03 ENCOUNTER — VIRTUAL VISIT (OUTPATIENT)
Dept: ENDOCRINOLOGY | Facility: CLINIC | Age: 48
End: 2023-03-03
Payer: COMMERCIAL

## 2023-03-03 DIAGNOSIS — E66.01 CLASS 3 SEVERE OBESITY DUE TO EXCESS CALORIES WITH SERIOUS COMORBIDITY AND BODY MASS INDEX (BMI) OF 40.0 TO 44.9 IN ADULT (H): Primary | ICD-10-CM

## 2023-03-03 DIAGNOSIS — E66.813 CLASS 3 SEVERE OBESITY DUE TO EXCESS CALORIES WITH SERIOUS COMORBIDITY AND BODY MASS INDEX (BMI) OF 40.0 TO 44.9 IN ADULT (H): Primary | ICD-10-CM

## 2023-03-03 PROCEDURE — 99213 OFFICE O/P EST LOW 20 MIN: CPT | Mod: VID | Performed by: NURSE PRACTITIONER

## 2023-03-03 RX ORDER — SEMAGLUTIDE 1 MG/.5ML
1 INJECTION, SOLUTION SUBCUTANEOUS
Qty: 2 ML | Refills: 3 | Status: SHIPPED | OUTPATIENT
Start: 2023-03-03 | End: 2023-06-06

## 2023-03-03 NOTE — LETTER
3/3/2023       RE: aKleigh Nam  [address] [facility] [address]     Dear Colleague,    Thank you for referring your patient, Kaleigh Nam, to the [facility] [place] at [facility]. Please see a copy of my visit note below.    Kaleigh Nam is a 48 year old who is being evaluated via a billable video visit.      How would you like to obtain your AVS? MyChart  If the video visit is dropped, the invitation should be resent by: Text to cell phone: [phone]  Will anyone else be joining your video visit? No  If patient encounters technical issues they should call [phone]    During this virtual visit the patient is located in [place], patient verifies this as the location during the entirety of this visit.     Video-Visit Details  Video Start Time: 702    Type of service:  Video Visit    Video End Time:708    Originating Location (pt. Location): Home  Distant Location (provider location):  [facility] [place]  Platform used for Video Visit: Sharon Bryant CMA  3/2/2023      3:32 PM        Return Medical Weight Management Note     Kaleigh Nam  MRN:  [id number]  :  1975  ELIAS:  3/3/2023    Dear Dusty Bang MD,    I had the pleasure of seeing your patient Kaleigh Nam. She is a 48 year old female who I am continuing to see for treatment of obesity related to:       2021   I have the following health issues associated with obesity: Pre-Diabetes, GERD (Reflux), Stress Incontinence, Osteoarthritis (joint disease), Hypothyroidism   I have the following symptoms associated with obesity: Knee Pain, Depression, Lower Extremity Swelling, Back Pain, Fatigue, Groin Rash, Hip Pain       Assessment & Plan   Problem List Items Addressed This Visit        Digestive    Class 3 severe obesity due to excess calories with serious comorbidity and body mass  index (BMI) of 40.0 to 44.9 in adult (H) - Primary     Starting to noticed good benefit from Wegovy. Taking 0.5mg now and will go up to 1mg next week. Had to pause while she had covid x 2 weeks. No adverse side effects. When taking previously, she stayed at 1mg. We will stay at 1mg until follow up in 3 months, she'll let me know if she wants to go up to 1.7mg prior to follow up.     Plan:  Go up to 1mg as planned  Okay to go up to 1.7mg if needed before follow up  Keep up the great work cooking healthy meals with family!   Continue to work on stress management  Follow up 3 months, sooner if needed          Relevant Medications    Semaglutide-Weight Management (WEGOVY) 1 MG/0.5ML pen          INTERVAL HISTORY:  Last seen 12/2022. Ready to start working on self again after pausing during traumatic event over the summer.     Since last seen- started hormone replacement therapy     Had covid in Feb - still some fatigue from this     Anti-obesity medications:     Current:   wegovy - stopped x 2 weeks for covid- has one more week of the 0.5mg and will then start 1mg - occasional nausea but manageable (very mild)   -less hunger and thoughts about food   -smaller portions     Recent diet changes:   Has cut out alcohol  [facility] cook book and healthy sheet pan meals    Recent exercise/activity changes:   Was starting PT  Lots of foot pain     Recent stressors:   Working on stress management   Grieving at last visit due to wife's son dying by suicide     Recent sleep changes: okay     Vitamins/Labs: 12/2022 - reviewed     CURRENT WEIGHT:   275 lbs 0 oz    Initial Weight (lbs): 275 lbs  Last Visits Weight: 127 kg (280 lb)  Cumulative weight loss (lbs): 0  Weight Loss Percentage: 0%    Changes and Difficulties 3/2/2023   I have made the following changes to my diet since my last visit: No alcohol, more fruit   With regards to my diet, I am still struggling with: None   I have made the following changes to my  activity/exercise since my last visit: Physical Therapy   With regards to my activity/exercise, I am still struggling with: Knee pain         MEDICATIONS:   Current Outpatient Medications   Medication Sig Dispense Refill     Cholecalciferol (VITAMIN D3) 50 MCG (2000 UT) CAPS        clindamycin (CLINDAMAX) 1 % external gel Apply to AA QD 60 g 11     DULoxetine (CYMBALTA) 60 MG capsule TAKE ONE CAPSULE BY MOUTH ONCE DAILY 90 capsule 3     estradiol (VIVELLE-DOT) 0.05 MG/24HR bi-weekly patch Place 1 patch onto the skin twice a week 8 patch 3     ferrous sulfate (FEROSUL) 325 (65 Fe) MG tablet Take 1 tablet (325 mg) by mouth daily (with breakfast) 90 tablet 3     levothyroxine (SYNTHROID/LEVOTHROID) 137 MCG tablet Take 1 tablet (137 mcg) by mouth daily 90 tablet 3     progesterone (PROMETRIUM) 100 MG capsule Take 100 mg for 12 days, starting on day 14 of your cycle (period starts day 1). 90 capsule 0     Semaglutide-Weight Management (WEGOVY) 1 MG/0.5ML pen Inject 1 mg Subcutaneous every 7 days 2 mL 3     traZODone (DESYREL) 50 MG tablet Take 1-2 tablets ( mg) by mouth nightly as needed for sleep 90 tablet 3     triamcinolone (KENALOG) 0.1 % external cream Apply to AA on the hands and arms BID x 1-2 week then PRN only 80 g 2       Weight Loss Medication History Reviewed With Patient 3/2/2023   Which weight loss medications are you currently taking on a regular basis?  Wegovy   If you are not taking a weight loss medication that was prescribed to you, please indicate why: -   Are you having any side effects from the weight loss medication that we have prescribed you? No   If you are having side effects please describe: -       Office Visit on 12/09/2022   Component Date Value Ref Range Status     Interpretation 12/09/2022 Negative for Intraepithelial Lesion or Malignancy (NILM)    Final     Comment 12/09/2022    Final                    Value:This result contains rich text formatting which cannot be displayed here.      Specimen Adequacy 12/09/2022 Satisfactory for evaluation, endocervical/transformation zone component present   Final     Clinical Information 12/09/2022    Final                    Value:This result contains rich text formatting which cannot be displayed here.     Reflex Testing 12/09/2022 Yes regardless of result   Final     Previous Abnormal? 12/09/2022    Final                    Value:This result contains rich text formatting which cannot be displayed here.     Performing Labs 12/09/2022    Final                    Value:This result contains rich text formatting which cannot be displayed here.     Hemoglobin A1C 12/09/2022 5.4  0.0 - 5.6 % Final    Normal <5.7%   Prediabetes 5.7-6.4%    Diabetes 6.5% or higher     Note: Adopted from ADA consensus guidelines.     Sodium 12/09/2022 139  136 - 145 mmol/L Final     Potassium 12/09/2022 4.4  3.4 - 5.3 mmol/L Final     Chloride 12/09/2022 103  98 - 107 mmol/L Final     Carbon Dioxide (CO2) 12/09/2022 22  22 - 29 mmol/L Final     Anion Gap 12/09/2022 14  7 - 15 mmol/L Final     Urea Nitrogen 12/09/2022 17.3  6.0 - 20.0 mg/dL Final     Creatinine 12/09/2022 0.74  0.51 - 0.95 mg/dL Final     Calcium 12/09/2022 9.0  8.6 - 10.0 mg/dL Final     Glucose 12/09/2022 94  70 - 99 mg/dL Final     Alkaline Phosphatase 12/09/2022 84  35 - 104 U/L Final     AST 12/09/2022 22  10 - 35 U/L Final     ALT 12/09/2022 13  10 - 35 U/L Final     Protein Total 12/09/2022 7.3  6.4 - 8.3 g/dL Final     Albumin 12/09/2022 4.2  3.5 - 5.2 g/dL Final     Bilirubin Total 12/09/2022 0.4  <=1.2 mg/dL Final     GFR Estimate 12/09/2022 >90  >60 mL/min/1.73m2 Final    Effective December 21, 2021 eGFRcr in adults is calculated using the 2021 CKD-EPI creatinine equation which includes age and gender (Chaitanya funes al., NEJM, DOI: 10.1056/KOQDcz6795118)     TSH 12/09/2022 2.78  0.30 - 4.20 uIU/mL Final     Other HR HPV 12/09/2022 Negative  Negative Final     HPV16 DNA 12/09/2022 Negative  Negative Final      "HPV18 DNA 12/09/2022 Negative  Negative Final     FINAL DIAGNOSIS 12/09/2022    Final                    Value:This result contains rich text formatting which cannot be displayed here.       PHYSICAL EXAM:  Objective     Ht 1.676 m (5' 6\")   Wt 124.7 kg (275 lb)   BMI 44.39 kg/m      Vitals - Patient Reported  Pain Score: No Pain (0)        GENERAL: Healthy, alert and no distress  EYES: Eyes grossly normal to inspection.  No discharge or erythema, or obvious scleral/conjunctival abnormalities.  RESP: No audible wheeze, cough, or visible cyanosis.  No visible retractions or increased work of breathing.    SKIN: Visible skin clear. No significant rash, abnormal pigmentation or lesions.  NEURO: Cranial nerves grossly intact.  Mentation and speech appropriate for age.  PSYCH: Mentation appears normal, affect normal/bright, judgement and insight intact, normal speech and appearance well-groomed.        Sincerely,    Lisa Andre NP      20 minutes spent on the date of the encounter doing chart review, history and exam, documentation and further activities per the note      "

## 2023-03-03 NOTE — PROGRESS NOTES
Return Medical Weight Management Note     Kaleigh Nam  MRN:  [id number]  :  1975  ELIAS:  3/3/2023    Dear Dusty Bang MD,    I had the pleasure of seeing your patient Kaleigh Nam. She is a 48 year old female who I am continuing to see for treatment of obesity related to:       2021   I have the following health issues associated with obesity: Pre-Diabetes, GERD (Reflux), Stress Incontinence, Osteoarthritis (joint disease), Hypothyroidism   I have the following symptoms associated with obesity: Knee Pain, Depression, Lower Extremity Swelling, Back Pain, Fatigue, Groin Rash, Hip Pain       Assessment & Plan   Problem List Items Addressed This Visit        Digestive    Class 3 severe obesity due to excess calories with serious comorbidity and body mass index (BMI) of 40.0 to 44.9 in adult (H) - Primary     Starting to noticed good benefit from Wegovy. Taking 0.5mg now and will go up to 1mg next week. Had to pause while she had covid x 2 weeks. No adverse side effects. When taking previously, she stayed at 1mg. We will stay at 1mg until follow up in 3 months, she'll let me know if she wants to go up to 1.7mg prior to follow up.     Plan:  Go up to 1mg as planned  Okay to go up to 1.7mg if needed before follow up  Keep up the great work cooking healthy meals with family!   Continue to work on stress management  Follow up 3 months, sooner if needed          Relevant Medications    Semaglutide-Weight Management (WEGOVY) 1 MG/0.5ML pen          INTERVAL HISTORY:  Last seen 2022. Ready to start working on self again after pausing during traumatic event over the summer.     Since last seen- started hormone replacement therapy     Had covid in Feb - still some fatigue from this     Anti-obesity medications:     Current:   wegovy - stopped x 2 weeks for covid- has one more week of the 0.5mg and will then start 1mg - occasional nausea but manageable (very mild)   -less hunger and  thoughts about food   -smaller portions     Recent diet changes:   Has cut out alcohol  [facility] cook book and healthy sheet pan meals    Recent exercise/activity changes:   Was starting PT  Lots of foot pain     Recent stressors:   Working on stress management   Grieving at last visit due to wife's son dying by suicide     Recent sleep changes: okay     Vitamins/Labs: 12/2022 - reviewed     CURRENT WEIGHT:   275 lbs 0 oz    Initial Weight (lbs): 275 lbs  Last Visits Weight: 127 kg (280 lb)  Cumulative weight loss (lbs): 0  Weight Loss Percentage: 0%    Changes and Difficulties 3/2/2023   I have made the following changes to my diet since my last visit: No alcohol, more fruit   With regards to my diet, I am still struggling with: None   I have made the following changes to my activity/exercise since my last visit: Physical Therapy   With regards to my activity/exercise, I am still struggling with: Knee pain         MEDICATIONS:   Current Outpatient Medications   Medication Sig Dispense Refill     Cholecalciferol (VITAMIN D3) 50 MCG (2000 UT) CAPS        clindamycin (CLINDAMAX) 1 % external gel Apply to AA QD 60 g 11     DULoxetine (CYMBALTA) 60 MG capsule TAKE ONE CAPSULE BY MOUTH ONCE DAILY 90 capsule 3     estradiol (VIVELLE-DOT) 0.05 MG/24HR bi-weekly patch Place 1 patch onto the skin twice a week 8 patch 3     ferrous sulfate (FEROSUL) 325 (65 Fe) MG tablet Take 1 tablet (325 mg) by mouth daily (with breakfast) 90 tablet 3     levothyroxine (SYNTHROID/LEVOTHROID) 137 MCG tablet Take 1 tablet (137 mcg) by mouth daily 90 tablet 3     progesterone (PROMETRIUM) 100 MG capsule Take 100 mg for 12 days, starting on day 14 of your cycle (period starts day 1). 90 capsule 0     Semaglutide-Weight Management (WEGOVY) 1 MG/0.5ML pen Inject 1 mg Subcutaneous every 7 days 2 mL 3     traZODone (DESYREL) 50 MG tablet Take 1-2 tablets ( mg) by mouth nightly as needed for sleep 90 tablet 3     triamcinolone (KENALOG) 0.1  % external cream Apply to AA on the hands and arms BID x 1-2 week then PRN only 80 g 2       Weight Loss Medication History Reviewed With Patient 3/2/2023   Which weight loss medications are you currently taking on a regular basis?  Wegovy   If you are not taking a weight loss medication that was prescribed to you, please indicate why: -   Are you having any side effects from the weight loss medication that we have prescribed you? No   If you are having side effects please describe: -       Office Visit on 12/09/2022   Component Date Value Ref Range Status     Interpretation 12/09/2022 Negative for Intraepithelial Lesion or Malignancy (NILM)    Final     Comment 12/09/2022    Final                    Value:This result contains rich text formatting which cannot be displayed here.     Specimen Adequacy 12/09/2022 Satisfactory for evaluation, endocervical/transformation zone component present   Final     Clinical Information 12/09/2022    Final                    Value:This result contains rich text formatting which cannot be displayed here.     Reflex Testing 12/09/2022 Yes regardless of result   Final     Previous Abnormal? 12/09/2022    Final                    Value:This result contains rich text formatting which cannot be displayed here.     Performing Labs 12/09/2022    Final                    Value:This result contains rich text formatting which cannot be displayed here.     Hemoglobin A1C 12/09/2022 5.4  0.0 - 5.6 % Final    Normal <5.7%   Prediabetes 5.7-6.4%    Diabetes 6.5% or higher     Note: Adopted from ADA consensus guidelines.     Sodium 12/09/2022 139  136 - 145 mmol/L Final     Potassium 12/09/2022 4.4  3.4 - 5.3 mmol/L Final     Chloride 12/09/2022 103  98 - 107 mmol/L Final     Carbon Dioxide (CO2) 12/09/2022 22  22 - 29 mmol/L Final     Anion Gap 12/09/2022 14  7 - 15 mmol/L Final     Urea Nitrogen 12/09/2022 17.3  6.0 - 20.0 mg/dL Final     Creatinine 12/09/2022 0.74  0.51 - 0.95 mg/dL Final      "Calcium 12/09/2022 9.0  8.6 - 10.0 mg/dL Final     Glucose 12/09/2022 94  70 - 99 mg/dL Final     Alkaline Phosphatase 12/09/2022 84  35 - 104 U/L Final     AST 12/09/2022 22  10 - 35 U/L Final     ALT 12/09/2022 13  10 - 35 U/L Final     Protein Total 12/09/2022 7.3  6.4 - 8.3 g/dL Final     Albumin 12/09/2022 4.2  3.5 - 5.2 g/dL Final     Bilirubin Total 12/09/2022 0.4  <=1.2 mg/dL Final     GFR Estimate 12/09/2022 >90  >60 mL/min/1.73m2 Final    Effective December 21, 2021 eGFRcr in adults is calculated using the 2021 CKD-EPI creatinine equation which includes age and gender (Chaitanya et al., NE, DOI: 10.1056/FRXUkw1708679)     TSH 12/09/2022 2.78  0.30 - 4.20 uIU/mL Final     Other HR HPV 12/09/2022 Negative  Negative Final     HPV16 DNA 12/09/2022 Negative  Negative Final     HPV18 DNA 12/09/2022 Negative  Negative Final     FINAL DIAGNOSIS 12/09/2022    Final                    Value:This result contains rich text formatting which cannot be displayed here.       PHYSICAL EXAM:  Objective    Ht 1.676 m (5' 6\")   Wt 124.7 kg (275 lb)   BMI 44.39 kg/m      Vitals - Patient Reported  Pain Score: No Pain (0)        GENERAL: Healthy, alert and no distress  EYES: Eyes grossly normal to inspection.  No discharge or erythema, or obvious scleral/conjunctival abnormalities.  RESP: No audible wheeze, cough, or visible cyanosis.  No visible retractions or increased work of breathing.    SKIN: Visible skin clear. No significant rash, abnormal pigmentation or lesions.  NEURO: Cranial nerves grossly intact.  Mentation and speech appropriate for age.  PSYCH: Mentation appears normal, affect normal/bright, judgement and insight intact, normal speech and appearance well-groomed.        Sincerely,    Lisa Andre NP      20 minutes spent on the date of the encounter doing chart review, history and exam, documentation and further activities per the note  "

## 2023-03-03 NOTE — PATIENT INSTRUCTIONS
"Thank you for allowing us the privilege of caring for you. We hope we provided you with the excellent service you deserve.   Please let us know if there is anything else we can do for you so that we can be sure you are completely satisfied with your care experience.    To ensure the quality of our services you may be receiving a patient satisfaction survey from an independent patient satisfaction monitoring company.    The greatest compliment you can give is a \"Likely to Recommend\"    Your visit was with Lisa Andre NP today.    Instructions per today's visit:     Dimitry Nam, it was great to visit with you today.  Here is a review of our visit.  If our clinic scheduler is not able to reach you please call [phone] to schedule your next appointments.    Plan:  Go up to 1mg as planned  Okay to go up to 1.7mg if needed before follow up  Keep up the great work cooking healthy meals with family!   Continue to work on stress management  Follow up 3 months, sooner if needed       Information about Video Visits with [facility]: video visit information  _________________________________________________________________________________________________________________________________________________________  If you are asked by your clinic team to have your blood pressure checked:  [facility] do offer several locations for blood pressure checks. Please follow the below link to schedule an appointment. Scheduling an appointment at the pharmacy for a blood pressure check is now preferred.    Appointment Plus (appointment-[email or web address].Osito)  _________________________________________________________________________________________________________________________________________________________  Important contact and scheduling information:  Please call our contact center at [phone] to schedule your next appointments.  To find a lab location near you, please call [phone].  For any nursing questions or " concerns call Denita Gutiérrez LPN at [phone] or Jadyn Rodgers RN at [phone]  Please call during clinic hours Monday through Friday 8:00a - 4:00p if you have questions or you can contact us via [employer] at anytime and we will reply during clinic hours.    Lab results will be communicated through My Chart or letter (if My Chart not used). Please call the clinic if you have not received communication after 1 week or if you have any questions.?  Clinic Fax: [phone]    _________________________________________________________________________________________________________________________________________________________  Meal Replacement Products:    Here is the link to our new e-store where you can purchase our meal replacement products    [facility] E-Store  [email or web address]    The one week starter kit is a great way to sample a variety of products and see what works for you.    If you want more information about the product go to: [employer]    If you are an employee or [facility] or [facility] please contact your care team for a 10% estore discount    Free Shipping for orders over $75     Benefits of meal replacements products:    Portion and calorie control  Improved nutrition  Structured eating  Simplified food choices  Avoid contact with trigger foods  _________________________________________________________________________________________________________________________________________________________  Interested in working with a health ?  Health coaches work with you to improve your overall health and wellbeing.  They look at the whole person, and may involve discussion of different areas of life, including, but not limited to the four pillars of health (sleep, exercise, nutrition, and stress management). Discuss with your care team if you would like to start working a health .  Health Coaching-3 Pack: Schedule by  calling [phone]    $99 for three health coaching visits    Visits may be done in person or via phone    Coaching is a partnership between the  and the client; Coaches do not prescribe or diagnose    Coaching helps inspire the client to reach his/her personal goals   _________________________________________________________________________________________________________________________________________________________  24 Week Healthy Lifestyle Plan:    Our mission in the 24-week Healthy Lifestyle Plan is to provide you with individualized care by giving you the tools, education and support you need to lose weight and maintain a healthy lifestyle. In your 24-week journey, you ll be supported by a dedicated weight loss team that includes registered dietitians, medical weight management providers, health coaches, and nurses -- all with special expertise in weight loss -- to help you every step of the way.     Monthly meetings with your registered dietician or medical weight management provider help to review your progress, update your care plan, and make any adjustments needed to ensure success. Between these visits, weekly and bi-weekly health  visits will help you focus on the four pillars of weight loss -- stress, sleep, nutrition, and exercise -- and how you can best adapt each to achieve sustainable weight loss results.    In addition, you will be given exclusive access to online wellbeing classes through [employer].  Your initial visit will be with a medical weight management provider who will help to understand your weight loss goals and ensure this program is the right fit for you. Please let our team know if you are interested in the 24 week plan by sending a message to your care team or calling [phone] to schedule.  _________________________________________________________________________________________________________________________________________________________  __________  [facility] Get Moving Program  Our team of physical therapists is trained to help you understand and take control of your condition. They will perform a thorough evaluation to determine your ability for activity and develop a customized plan to fit your goals and physical ability.  Scheduling: Unsure if the Get Moving program is right for you? Discuss the program with your medical provider or diabetes educator. You can also call us at [phone] to ask questions or schedule an appointment.   [facility] Get Moving Program  ____________________________________________________________________________________________________________________________________________________________________________  [facility] Diabetes Prevention Program (DPP)  If you have prediabetes and Medicare please contact us via InternetVistahart to learn more about the Diabetes Prevention Program (DPP)  Program Details:  [facility] offers the year-long Diabetes Prevention Program (DPP). The program helps you to make lifestyle changes that prevent or delay type 2 diabetes by supporting healthy eating, increased physical activity, stress reduction and use of coping skills.   On average, previous [facility] DPP cohorts have lost and maintained at least 5% of their starting weight throughout the program and averaged more than 150 minutes of physical activity per week.  Participants meet weekly for one-hour group sessions over sixteen weeks, every other week for the next 8 weeks, and monthly for the last six months.   A year-long maintenance program is also available for participants who complete the first year.   Location & Cost:   During the COVID-19 Public Health Emergency, the program is offered virtually. When in-person classes can resume, they will be held at [facility].  For people with Medicare, the program is covered in full. A self-pay option will also be available for those with non-Medicare  insurance plans.   ______________________________________________________________________________________________________________________________________________________________________________________________________________________________    To work with a Behavioral Health Psychologist:    Call to schedule:    Jean Lazaro - [phone]  Valeria Jurado - [phone]  Lidia Love - [phone]  Padmini Brandon - [phone]   Janeth Carrera PhD (cannot accept Medicare) [phone]        Thank lei,   [facility] Comprehensive Weight Management Team

## 2023-03-03 NOTE — ASSESSMENT & PLAN NOTE
Starting to noticed good benefit from Wegovy. Taking 0.5mg now and will go up to 1mg next week. Had to pause while she had covid x 2 weeks. No adverse side effects. When taking previously, she stayed at 1mg. We will stay at 1mg until follow up in 3 months, she'll let me know if she wants to go up to 1.7mg prior to follow up.     Plan:  Go up to 1mg as planned  Okay to go up to 1.7mg if needed before follow up  Keep up the great work cooking healthy meals with family!   Continue to work on stress management  Follow up 3 months, sooner if needed

## 2023-03-06 ENCOUNTER — VIRTUAL VISIT (OUTPATIENT)
Dept: ENDOCRINOLOGY | Facility: CLINIC | Age: 48
End: 2023-03-06
Payer: COMMERCIAL

## 2023-03-06 DIAGNOSIS — E66.9 OBESITY: ICD-10-CM

## 2023-03-06 DIAGNOSIS — Z71.3 NUTRITIONAL COUNSELING: Primary | ICD-10-CM

## 2023-03-06 PROCEDURE — 97803 MED NUTRITION INDIV SUBSEQ: CPT | Mod: VID | Performed by: DIETITIAN, REGISTERED

## 2023-03-06 NOTE — LETTER
"3/6/2023       RE: Kaleigh Nam  [address] [address]     Dear Colleague,    Thank you for referring your patient, Kaleigh Nam, to the [facility] [place] at [facility]. Please see a copy of my visit note below.    Kaleigh Nam is a 46 year old female who is being evaluated via a billable video visit.      The patient has been notified of following:     \"This video visit will be conducted via a call between you and your physician/provider. We have found that certain health care needs can be provided without the need for an in-person physical exam.  This service lets us provide the care you need with a video conversation.  If a prescription is necessary we can send it directly to your pharmacy.  If lab work is needed we can place an order for that and you can then stop by our lab to have the test done at a later time.    Video visits are billed at different rates depending on your insurance coverage.  Please reach out to your insurance provider with any questions.    If during the course of the call the physician/provider feels a video visit is not appropriate, you will not be charged for this service.\"    Patient has given verbal consent for Video visit? Yes  How would you like to obtain your AVS? MyChart  If you are dropped from the video visit, the video invite should be resent to: Text to cell phone: [phone]  Will anyone else be joining your video visit? No  {If patient encounters technical issues they should call [phone]      Video-Visit Details    Type of service:  Video Visit    Video Start Time: 10:45 AM  Video End Time: 10:31 AM    Originating Location (pt. Location): Home    Distant Location (provider location):  [facility] [place]     Platform used for Video Visit: MobiVita    During this virtual visit the patient is located in [place], patient " "verifies this as the location during the entirety of this visit.     Return Weight Management Nutrition Consultation    Kaleigh Nam is a 46 year old female presents today for a return weight management nutrition consultation.  Patient referred by Lisa Andre NP on June 23, 2021.    Patient with Co-morbidities of obesity including:  Type II DM no (pre-diabetes)  Renal Failure no  Sleep apnea no  Hypertension no   Dyslipidemia no  Joint pain yes  Back pain yes  GERD yes     PMHx: Hypothyroidism (Levothyroxine)    Anthropometrics:  Estimated body mass index is 44.39 kg/m  as calculated from the following:    Height as of 3/2/23: 1.676 m (5' 6\").    Weight as of 3/2/23: 124.7 kg (275 lb).     Current weight: 275 lbs     Medications for Weight Loss:  Wegovy    NUTRITION HISTORY  See MD note for details.    Pt reports decreased/controlled appetite taking Wegovy. Motivated to make changes to promote a healthy lifestyle for the whole family.  Working on implementing breakfast and healthy breakfast choices (when going to work: hard-boiled eggs + fruit).    1/11/23: Pt last saw Hailey Mcneil RD, LD 8/10/21. States she is struggling with finding fast and healthy options. Has started incorporating more fruits and vegetables. Has ice cream after dinner most nights. Wife is gluten free and kids tend to be picky eaters which makes family meals difficult.    3/6/23: Pt has lost 5 lbs since last visit. Has been focused on meal planning and using sheet pan recipes and soups for several of her meals. Has been keeping carb portions low and veggie portions larger. States she feels her calorie needs have decreased significantly with age/menopause. Wegovy has helped with appetite. Still snacking some at night and is hoping that at the 1 mg dose will help reduce that. Has been continuing with physical therapy for knee.    Recent food recall:  Breakfast: coffee, almond pastry  Lunch: broccoli cheddar soup, multigrain " crackers/chips  Dinner: Ordered [employer] (shared: Ribs, some fries, Asian cx salad, fried chicken tenders, artichoke dip)  Snacks: cheese and apple, coffee and cookie, greek yogurt,   Beverages: coffee (morning/afternoon with half and half, going to work caramel [employer] with skim/lite syrups), no soda, flavored soda water, body armor occ  Alcohol: rarely (once/month)  Dining out: take out once/twice a week    Physical Activity:  Walking 2 x week  Limited d/t pain (h/o torn meniscus and more challenging with weight gain)    Progress on Previous Goals  1) Quick and easy meal options: https://www.MonoSphere/collection/easy-dinner-ideas/   -gluten free: https://[employer].MonoSphere/collection/gluten-free-dinner-recipes/  2) Meal prepping tips: https://www.Amgen/BiddingForGoodeats/healthy-tips/2019/10/meal-prep-tips-hacks-experts  3) Menopause nutrition: https://health.[facility].org/menopause-diet/    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  1500 calories/day (per BEE or ~1900 alexia/day and loss of 1 lb per week w/o exercise)  Volumetrics/Plate Method    Nutrition Diagnosis  Food and nutrition related knowledge deficit r/t lack of prior exposure to calorie counting and nutrition education aeb pt unable to verbalize understanding of volumetrics diet for weight loss.  and  Obesity r/t long history of self-monitoring deficit and excessive energy intake aeb BMI >30.    Nutrition Intervention  Materials/education provided on Volumetric eating to help satiety level on fewer calories; portion control and healthy food choices (Plate Method and Volumetrics handouts), 100 calorie snack choices, meal and snack planning and websites, sample meal plans     Patient demonstrates understanding.    Expected Engagement: good  Follow-Up Plans: meal planning, calorie tracking, stress/boredom  eating    Nutrition Goals  1) Continue focusing on lean proteins and non-starchy vegetables  2) Increase physical activity as able      The Plate Method  [email or web address]    Protein Sources   [email or web address]     Carbohydrates  [email or web address]     Mindful Eating  [email or web address]     Summary of Volumetrics Eating Plan  [email or web address]       Follow-Up:  1 month or PRN    Time spent with patient: 16 minutes.  Leslie Borja RD, LD

## 2023-03-06 NOTE — PROGRESS NOTES
"Kaleigh Nam is a 46 year old female who is being evaluated via a billable video visit.      The patient has been notified of following:     \"This video visit will be conducted via a call between you and your physician/provider. We have found that certain health care needs can be provided without the need for an in-person physical exam.  This service lets us provide the care you need with a video conversation.  If a prescription is necessary we can send it directly to your pharmacy.  If lab work is needed we can place an order for that and you can then stop by our lab to have the test done at a later time.    Video visits are billed at different rates depending on your insurance coverage.  Please reach out to your insurance provider with any questions.    If during the course of the call the physician/provider feels a video visit is not appropriate, you will not be charged for this service.\"    Patient has given verbal consent for Video visit? Yes  How would you like to obtain your AVS? MyChart  If you are dropped from the video visit, the video invite should be resent to: Text to cell phone: [phone]  Will anyone else be joining your video visit? No  {If patient encounters technical issues they should call [phone]      Video-Visit Details    Type of service:  Video Visit    Video Start Time: 10:45 AM  Video End Time: 10:31 AM    Originating Location (pt. Location): Home    Distant Location (provider location):  [facility] [place]     Platform used for Video Visit: [employer]    During this virtual visit the patient is located in [place], patient verifies this as the location during the entirety of this visit.     Return Weight Management Nutrition Consultation    Kaleigh Nam is a 46 year old female presents today for a return weight management nutrition consultation.  Patient referred by Lisa Andre NP on June 23, 2021.    Patient with Co-morbidities of obesity " "including:  Type II DM no (pre-diabetes)  Renal Failure no  Sleep apnea no  Hypertension no   Dyslipidemia no  Joint pain yes  Back pain yes  GERD yes     PMHx: Hypothyroidism (Levothyroxine)    Anthropometrics:  Estimated body mass index is 44.39 kg/m  as calculated from the following:    Height as of 3/2/23: 1.676 m (5' 6\").    Weight as of 3/2/23: 124.7 kg (275 lb).     Current weight: 275 lbs     Medications for Weight Loss:  Wegovy    NUTRITION HISTORY  See MD note for details.    Pt reports decreased/controlled appetite taking Wegovy. Motivated to make changes to promote a healthy lifestyle for the whole family.  Working on implementing breakfast and healthy breakfast choices (when going to work: hard-boiled eggs + fruit).    1/11/23: Pt last saw Hailey Mcneil RD, LD 8/10/21. States she is struggling with finding fast and healthy options. Has started incorporating more fruits and vegetables. Has ice cream after dinner most nights. Wife is gluten free and kids tend to be picky eaters which makes family meals difficult.    3/6/23: Pt has lost 5 lbs since last visit. Has been focused on meal planning and using sheet pan recipes and soups for several of her meals. Has been keeping carb portions low and veggie portions larger. States she feels her calorie needs have decreased significantly with age/menopause. Wegovy has helped with appetite. Still snacking some at night and is hoping that at the 1 mg dose will help reduce that. Has been continuing with physical therapy for knee.    Recent food recall:  Breakfast: coffee, almond pastry  Lunch: broccoli cheddar soup, multigrain crackers/chips  Dinner: Ordered Applesbees (shared: Ribs, some fries, Asian cx salad, fried chicken tenders, artichoke dip)  Snacks: cheese and apple, coffee and cookie, greek yogurt,   Beverages: coffee (morning/afternoon with half and half, going to work carIPG with skim/lite syrups), no soda, flavored soda water, body armor " occ  Alcohol: rarely (once/month)  Dining out: take out once/twice a week    Physical Activity:  Walking 2 x week  Limited d/t pain (h/o torn meniscus and more challenging with weight gain)    Progress on Previous Goals  1) Quick and easy meal options: https://www.Dogi/collection/easy-dinner-ideas/   -gluten free: https://www.Dogi/collection/gluten-free-dinner-recipes/  2) Meal prepping tips: https://www.B[employer]/KILTReats/healthy-tips/2019/10/meal-prep-tips-hacks-experts  3) Menopause nutrition: https://health.[facility].org/menopause-diet/    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  1500 calories/day (per BEE or ~1900 alexia/day and loss of 1 lb per week w/o exercise)  Volumetrics/Plate Method    Nutrition Diagnosis  Food and nutrition related knowledge deficit r/t lack of prior exposure to calorie counting and nutrition education aeb pt unable to verbalize understanding of volumetrics diet for weight loss.  and  Obesity r/t long history of self-monitoring deficit and excessive energy intake aeb BMI >30.    Nutrition Intervention  Materials/education provided on Volumetric eating to help satiety level on fewer calories; portion control and healthy food choices (Plate Method and Volumetrics handouts), 100 calorie snack choices, meal and snack planning and websites, sample meal plans     Patient demonstrates understanding.    Expected Engagement: good  Follow-Up Plans: meal planning, calorie tracking, stress/boredom eating    Nutrition Goals  1) Continue focusing on lean proteins and non-starchy vegetables  2) Increase physical activity as able      The Plate Method  [email or web address]    Protein Sources   [email or web address]     Carbohydrates  [email or web address]     Mindful Eating  [email or web address]      Summary of Volumetrics Eating Plan  [email or web address]       Follow-Up:  1 month or PRN    Time spent with patient: 16 minutes.  Leslie Borja RD, LD

## 2023-03-06 NOTE — PATIENT INSTRUCTIONS
Hi Carin!    Follow-up with RD in 3 months    Thank you,    Leslie Borja, CARMITA, LD  If you would like to schedule or reschedule an appointment with the RD, please call [phone]    Nutrition Goals  1) Continue focusing on lean proteins and non-starchy vegetables  2) Increase physical activity as able    Interested in working with a health ? Health coaches work with you to improve your overall health and wellbeing. They look at the whole person, and may involve discussion of different areas of life, including, but not limited to the four pillars of health (sleep, exercise, nutrition, and stress management). Discuss with your care team if you would like to start working a health .    Health Coaching-3 Pack:    $99 for three health coaching visits    Visits may be done in person or via phone    Coaching is a partnership between the  and the client; Coaches do not prescribe or diagnose    Coaching helps inspire the client to reach his/her personal goals    COMPREHENSIVE WEIGHT MANAGEMENT PROGRAM  VIRTUAL SUPPORT GROUPS    For Support Group Information:      We offer support groups for patients who are working on weight loss and considering, preparing for or have had weight loss surgery.   There is no cost for this opportunity.  You are invited to attend the?Virtual Support Groups?provided by any of the following locations:    [facility] via OneName Teams with Debi Nolen RN  2.   [place] via Tresata with Jesus Hahn, PhD, LP  3.   [place] via Tresata with Kimmie Monk, ERENDIRA  4.   [facility] via OneName Teams with Kimmie Suero, [facility]    The following Support Group information can also be found on our website:  [email or web address]/weight-loss-surgery-support-groups    https://www.[facility].org/treatments/weight-loss-and-weight-loss-surgery-support-groups    [facility] Weight Loss Surgery Support Group    [place]  "[facility] Weight Loss Surgery Support Group  The support group is a patient-lead forum that meets monthly to share experiences, encouragement and education. It is open to those who have had weight loss surgery, are scheduled for surgery, or are considering surgery.   WHEN: This group meets on the 3rd Wednesday of each month from 5:00PM - 6:00PM virtually using Microsoft Teams.   FACILITATOR: Led by Debi Bonilla RD, LD, RN, the program's Clinical Coordinator.   TO REGISTER: Please contact the clinic via Correlor or call the nurse line directly at [phone] to inform our staff that you would like an invite sent to you and to let us know the email you would like the invite sent to. Prior to the meeting, a link with directions on how to join the meeting will be sent to you.    2023 Meetings (speakers to be determined)  January 18: \"Let's Talk\" a time for the group to share.  February 15: \"Let's Talk\" a time for the group to share.  March 15: \"Let's Talk\" a time for the group to share.  April 19: \"Let's Talk\" a time for the group to share.  May 17: \"Let's Talk\" a time for the group to share.  June 21: \"Let's Talk\" a time for the group to share.    [facility] Support Groups    Connections Bariatric Care Support Group?  This is open to all pre- and post- operative bariatric surgery patients as well as their support system.   WHEN: This group meets the 2nd Tuesday of each month from 6:30 PM - 8:00 PM virtually using Microsoft Teams.   FACILITATOR: Led by Jesus Hahn, Ph.D who is a Licensed Psychologist with the [facility] Comprehensive Weight Management Program.   TO REGISTER: Please send an email to Jesus Hahn, Ph.D., LP [email or web address]?if you would like an invitation to the group and to learn about using Microsoft Teams.    2023 Meetings  January 10: Sudarshan Marie, PharmD, Pharmacy Resident, \"Medications and Bariatric Surgery\"  February " "14:  March 14:  April 11:  May 9:  June 11:    Connections Post-Operative Bariatric Surgery Support Group  This is a support group for [facility] bariatric patients (and those external to [facility]) who have had bariatric surgery and are at least 3 months post-surgery.  WHEN: This support group meets the 4th Wednesday of the month from 11:00 AM - 12:00 PM virtually using Microsoft Teams.   FACILITATOR: Led by Certified Bariatric Nurse, Kimmie Monk RN.   TO REGISTER: Please send an email to Kimmie at [email or web address] if you would like an invitation to the group and to learn about using Microsoft Teams.    2023 Meetings  January 25  February 22  March 22  April 26  May 24  [place] 28      [facility] Healthy Lifestyle Virtual Support Group    Healthy Lifestyle Virtual Support Group?  This is 60 minutes of small group guided discussion, support and resources. All are welcome who want a healthy lifestyle.  WHEN: This group meets monthly on a Friday from 12:30 PM - 1:30 PM virtually using Microsoft Teams.   FACILITATOR: Led by National Board Certified Health and , Kimmie Suero [facility].   TO REGISTER: Please send an email to Kimmie [email or web address] to receive monthly invites to the group or if you have any questions about having a health .  Prior to the meeting, a link with directions on how to join the meeting will be sent to you.    2023 Meetings January 20: \"Let's Talk\" a time for the group to share  February 17: Guest Speaker, Shaista Diallo RD, Registered Dietician, \"Tips to Maximize Your Metabolism\"  March 17: Let's Talk\" a time for the group to share  April 14: Guest Speaker, Leslie Borja RD, Registered Dietician, \"Heart Health\"  May 19: \"Let's Talk\" a time for the group to share  June: To be announced.      "

## 2023-03-13 ENCOUNTER — TRANSFERRED RECORDS (OUTPATIENT)
Dept: HEALTH INFORMATION MANAGEMENT | Facility: CLINIC | Age: 48
End: 2023-03-13
Payer: COMMERCIAL

## 2023-03-13 LAB — COLONOSCOPY STUDY: ABNORMAL

## 2023-04-07 DIAGNOSIS — L73.2 HIDRADENITIS SUPPURATIVA: ICD-10-CM

## 2023-04-07 DIAGNOSIS — N95.1 MENOPAUSAL SYNDROME (HOT FLASHES): ICD-10-CM

## 2023-04-07 DIAGNOSIS — Z79.890 HORMONE REPLACEMENT THERAPY: ICD-10-CM

## 2023-04-07 NOTE — TELEPHONE ENCOUNTER
Requested Prescriptions   Pending Prescriptions Disp Refills     clindamycin (CLINDAMAX) 1 % external gel 60 g 11     Sig: Apply to AA QD       There is no refill protocol information for this order        Last Written Prescription Date:  03/29/2022  Last Fill Quantity: 60g,  # refills: 11   Last office visit: 3/29/2022 ; last virtual visit: Visit date not found with prescribing provider:  Lidia Aparicio    Future Office Visit:      Thank you  Lashell LYLES

## 2023-04-10 RX ORDER — ESTRADIOL 0.05 MG/D
1 PATCH, EXTENDED RELEASE TRANSDERMAL
Qty: 24 PATCH | Refills: 2 | Status: SHIPPED | OUTPATIENT
Start: 2023-04-10 | End: 2023-12-28

## 2023-04-12 RX ORDER — CLINDAMYCIN PHOSPHATE 10 MG/G
GEL TOPICAL
Qty: 60 G | Refills: 1 | Status: SHIPPED | OUTPATIENT
Start: 2023-04-12 | End: 2024-02-22

## 2023-04-12 NOTE — TELEPHONE ENCOUNTER
Left message to schedule appt for refill.    Thank you,  Karissa DOUGLAS RN  Dermatology   [phone]

## 2023-04-12 NOTE — TELEPHONE ENCOUNTER
[facility] Call Center    Phone Message    May a detailed message be left on voicemail: yes     Reason for Call: Medication Refill Request    Has the patient contacted the pharmacy for the refill? Yes   Name of medication being requested: Clindamycin Gel  Provider who prescribed the medication: Lidia Cortes   Pharmacy: [facility] [place] - [address]   Date medication is needed: Now  Patient booked 1st avail appt 07/26/2023 with Dr. Colvin and would like a sorin refill      Action Taken: Other: OX DERM    Travel Screening: Not Applicable

## 2023-04-12 NOTE — TELEPHONE ENCOUNTER
Routing to provider for approval/denial since med does not meet protocol.      Thank you,  Karissa DOUGLAS RN  Dermatology   [phone]

## 2023-04-20 ENCOUNTER — TELEPHONE (OUTPATIENT)
Dept: ENDOCRINOLOGY | Facility: CLINIC | Age: 48
End: 2023-04-20
Payer: COMMERCIAL

## 2023-04-20 ENCOUNTER — MYC MEDICAL ADVICE (OUTPATIENT)
Dept: ENDOCRINOLOGY | Facility: CLINIC | Age: 48
End: 2023-04-20
Payer: COMMERCIAL

## 2023-04-28 DIAGNOSIS — Z79.890 HORMONE REPLACEMENT THERAPY: ICD-10-CM

## 2023-04-28 DIAGNOSIS — N95.1 MENOPAUSAL SYNDROME (HOT FLASHES): ICD-10-CM

## 2023-05-01 RX ORDER — PROGESTERONE 100 MG/1
CAPSULE ORAL
Qty: 90 CAPSULE | Refills: 0 | Status: SHIPPED | OUTPATIENT
Start: 2023-05-01 | End: 2023-11-10

## 2023-05-12 ENCOUNTER — TELEPHONE (OUTPATIENT)
Dept: ENDOCRINOLOGY | Facility: CLINIC | Age: 48
End: 2023-05-12

## 2023-05-12 NOTE — TELEPHONE ENCOUNTER
PA Initiation    Medication: Wegovy PA renewal  Insurance Company: [id number] - Phone [phone] Fax [phone]  Pharmacy Filling the Rx:    Filling Pharmacy Phone:    Filling Pharmacy Fax:    Start Date: 5/12/2023    [id number]    Sent [facility] to obtain current weight for questions

## 2023-05-19 NOTE — TELEPHONE ENCOUNTER
Spoke with patient about Wegovy pa expiring, she has about 5 weeks of Wegovy on hand, she has an appt with Dr. Andre on 06/06/23  She asked that we wait to renew prior auth until after that appointment. See if she will continue with Wegovy? If so we will need a current weight.

## 2023-06-02 ENCOUNTER — MYC MEDICAL ADVICE (OUTPATIENT)
Dept: SURGERY | Facility: CLINIC | Age: 48
End: 2023-06-02
Payer: COMMERCIAL

## 2023-06-06 ENCOUNTER — VIRTUAL VISIT (OUTPATIENT)
Dept: ENDOCRINOLOGY | Facility: CLINIC | Age: 48
End: 2023-06-06
Payer: COMMERCIAL

## 2023-06-06 ENCOUNTER — TELEPHONE (OUTPATIENT)
Dept: ENDOCRINOLOGY | Facility: CLINIC | Age: 48
End: 2023-06-06

## 2023-06-06 VITALS — BODY MASS INDEX: 44.03 KG/M2 | WEIGHT: 274 LBS | HEIGHT: 66 IN

## 2023-06-06 DIAGNOSIS — E66.813 CLASS 3 SEVERE OBESITY DUE TO EXCESS CALORIES WITH SERIOUS COMORBIDITY AND BODY MASS INDEX (BMI) OF 40.0 TO 44.9 IN ADULT (H): Primary | ICD-10-CM

## 2023-06-06 DIAGNOSIS — E66.01 CLASS 3 SEVERE OBESITY DUE TO EXCESS CALORIES WITH SERIOUS COMORBIDITY AND BODY MASS INDEX (BMI) OF 40.0 TO 44.9 IN ADULT (H): Primary | ICD-10-CM

## 2023-06-06 PROCEDURE — 99214 OFFICE O/P EST MOD 30 MIN: CPT | Mod: VID | Performed by: NURSE PRACTITIONER

## 2023-06-06 RX ORDER — SEMAGLUTIDE 1.7 MG/.75ML
1.7 INJECTION, SOLUTION SUBCUTANEOUS
Qty: 3 ML | Refills: 0 | Status: SHIPPED | OUTPATIENT
Start: 2023-06-06 | End: 2024-01-15

## 2023-06-06 RX ORDER — SEMAGLUTIDE 2.4 MG/.75ML
2.4 INJECTION, SOLUTION SUBCUTANEOUS
Qty: 3 ML | Refills: 3 | Status: SHIPPED | OUTPATIENT
Start: 2023-07-06 | End: 2024-01-15

## 2023-06-06 ASSESSMENT — PAIN SCALES - GENERAL: PAINLEVEL: MILD PAIN (2)

## 2023-06-06 NOTE — NURSING NOTE
Is the patient currently in the state of [place]? YES    Visit mode:VIDEO    If the visit is dropped, the patient can be reconnected by: VIDEO VISIT: Text to cell phone: [phone]    Will anyone else be joining the visit? NO      How would you like to obtain your AVS? MyChart    Are changes needed to the allergy or medication list? NO    Reason for visit: Follow Up

## 2023-06-06 NOTE — PROGRESS NOTES
Return Medical Weight Management Note     Kaleigh Nam  MRN:  [id number]  :  1975  ELIAS:  2023    Dear Dusty Bang MD,    I had the pleasure of seeing your patient Kaleigh Nam. She is a 48 year old female who I am continuing to see for treatment of obesity related to:        2021     8:56 PM   --   I have the following health issues associated with obesity Pre-Diabetes    GERD (Reflux)    Stress Incontinence    Osteoarthritis (joint disease)    Hypothyroidism   I have the following symptoms associated with obesity Knee Pain    Depression    Lower Extremity Swelling    Back Pain    Fatigue    Groin Rash    Hip Pain       Assessment & Plan   Problem List Items Addressed This Visit        Digestive    Class 3 severe obesity due to excess calories with serious comorbidity and body mass index (BMI) of 40.0 to 44.9 in adult (H) - Primary     Plan:  Increase wegovy to 1.7mg x 4 weeks  Increase to wegovy 2.4mg after that unless having side effects   Start with smaller portions   Great job with healthier consistent meals  Could consider lighter lunch   Could look for higher protein dips with veggies  Or consider making bigger dinner to plan for leftovers  Follow up 3 months          Relevant Medications    Semaglutide-Weight Management (WEGOVY) 1.7 MG/0.75ML pen    Semaglutide-Weight Management (WEGOVY) 2.4 MG/0.75ML pen (Start on 2023)          INTERVAL HISTORY:  Last seen 3/2023 - working as family to cook more and start healthy lifestyle.       Anti-obesity medications:     Current:   wegovy 1mg - no side effects,     Recent diet changes:   No alcohol   Hunger improved   Breakfast- scrambled eggs and potato OR greek yogurt with berries and granola   Lunch- struggles - sandwiches (josé antonio johns) or salad or leftovers   Dinner- sheet pan meals, grilling, fajitas etc   Not really eating between meals- sometimes an apple  Ice cream a few times a week      Recent exercise/activity  changes:   PTfor knee pain 1/2023 some benefit   Most of weight carried in hip/ thighs   Stairs are difficult     Recent stressors:   Very busy     Vitamins/Labs: 12/2023 wnl     CURRENT WEIGHT:   274 lbs 0 oz    Initial Weight (lbs): 275 lbs  Last Visits Weight: 124.7 kg (275 lb)  Cumulative weight loss (lbs): 1  Weight Loss Percentage: 0.36%        6/6/2023     6:25 AM   Changes and Difficulties   I have made the following changes to my diet since my last visit: Eliminated alcohol, less bread   With regards to my diet, I am still struggling with: Finding healthy options when busy   I have made the following changes to my activity/exercise since my last visit: Walking more   With regards to my activity/exercise, I am still struggling with: Joint pain       MEDICATIONS:   Current Outpatient Medications   Medication Sig Dispense Refill     clindamycin (CLINDAMAX) 1 % external gel Apply to AA QD 60 g 1     DULoxetine (CYMBALTA) 60 MG capsule TAKE ONE CAPSULE BY MOUTH ONCE DAILY 90 capsule 3     estradiol (VIVELLE-DOT) 0.05 MG/24HR bi-weekly patch Place 1 patch onto the skin twice a week 24 patch 2     ferrous sulfate (FEROSUL) 325 (65 Fe) MG tablet Take 1 tablet (325 mg) by mouth daily (with breakfast) 90 tablet 3     levothyroxine (SYNTHROID/LEVOTHROID) 137 MCG tablet Take 1 tablet (137 mcg) by mouth daily 90 tablet 3     progesterone (PROMETRIUM) 100 MG capsule Take 100 mg for 12 days, starting on day 14 of your cycle (period starts day 1). 90 capsule 0     Semaglutide-Weight Management (WEGOVY) 1.7 MG/0.75ML pen Inject 1.7 mg Subcutaneous every 7 days 3 mL 0     [START ON 7/6/2023] Semaglutide-Weight Management (WEGOVY) 2.4 MG/0.75ML pen Inject 2.4 mg Subcutaneous every 7 days 3 mL 3     traZODone (DESYREL) 50 MG tablet Take 1-2 tablets ( mg) by mouth nightly as needed for sleep 90 tablet 3     Cholecalciferol (VITAMIN D3) 50 MCG (2000 UT) CAPS  (Patient not taking: Reported on 6/6/2023)       triamcinolone  (KENALOG) 0.1 % external cream Apply to AA on the hands and arms BID x 1-2 week then PRN only (Patient not taking: Reported on 6/6/2023) 80 g 2           6/6/2023     6:25 AM   Weight Loss Medication History Reviewed With Patient   Which weight loss medications are you currently taking on a regular basis? Wegovy   Are you having any side effects from the weight loss medication that we have prescribed you? No       Office Visit on 12/09/2022   Component Date Value Ref Range Status     Interpretation 12/09/2022 Negative for Intraepithelial Lesion or Malignancy (NILM)    Final     Comment 12/09/2022    Final                    Value:This result contains rich text formatting which cannot be displayed here.     Specimen Adequacy 12/09/2022 Satisfactory for evaluation, endocervical/transformation zone component present   Final     Clinical Information 12/09/2022    Final                    Value:This result contains rich text formatting which cannot be displayed here.     Reflex Testing 12/09/2022 Yes regardless of result   Final     Previous Abnormal? 12/09/2022    Final                    Value:This result contains rich text formatting which cannot be displayed here.     Performing Labs 12/09/2022    Final                    Value:This result contains rich text formatting which cannot be displayed here.     Hemoglobin A1C 12/09/2022 5.4  0.0 - 5.6 % Final    Normal <5.7%   Prediabetes 5.7-6.4%    Diabetes 6.5% or higher     Note: Adopted from ADA consensus guidelines.     Sodium 12/09/2022 139  136 - 145 mmol/L Final     Potassium 12/09/2022 4.4  3.4 - 5.3 mmol/L Final     Chloride 12/09/2022 103  98 - 107 mmol/L Final     Carbon Dioxide (CO2) 12/09/2022 22  22 - 29 mmol/L Final     Anion Gap 12/09/2022 14  7 - 15 mmol/L Final     Urea Nitrogen 12/09/2022 17.3  6.0 - 20.0 mg/dL Final     Creatinine 12/09/2022 0.74  0.51 - 0.95 mg/dL Final     Calcium 12/09/2022 9.0  8.6 - 10.0 mg/dL Final     Glucose 12/09/2022 94  70 -  "99 mg/dL Final     Alkaline Phosphatase 12/09/2022 84  35 - 104 U/L Final     AST 12/09/2022 22  10 - 35 U/L Final     ALT 12/09/2022 13  10 - 35 U/L Final     Protein Total 12/09/2022 7.3  6.4 - 8.3 g/dL Final     Albumin 12/09/2022 4.2  3.5 - 5.2 g/dL Final     Bilirubin Total 12/09/2022 0.4  <=1.2 mg/dL Final     GFR Estimate 12/09/2022 >90  >60 mL/min/1.73m2 Final    Effective December 21, 2021 eGFRcr in adults is calculated using the 2021 CKD-EPI creatinine equation which includes age and gender (Chaitanya et al., NE, DOI: 10.1056/JBUJoo2262213)     TSH 12/09/2022 2.78  0.30 - 4.20 uIU/mL Final     Other HR HPV 12/09/2022 Negative  Negative Final     HPV16 DNA 12/09/2022 Negative  Negative Final     HPV18 DNA 12/09/2022 Negative  Negative Final     FINAL DIAGNOSIS 12/09/2022    Final                    Value:This result contains rich text formatting which cannot be displayed here.       PHYSICAL EXAM:  Objective    Ht 1.676 m (5' 6\")   Wt 124.3 kg (274 lb)   BMI 44.22 kg/m      Vitals - Patient Reported  Pain Score: Mild Pain (2)  Pain Loc: Knee (left knee)      Vitals:  No vitals were obtained today due to virtual visit.    GENERAL: Healthy, alert and no distress  EYES: Eyes grossly normal to inspection.  No discharge or erythema, or obvious scleral/conjunctival abnormalities.  RESP: No audible wheeze, cough, or visible cyanosis.  No visible retractions or increased work of breathing.    SKIN: Visible skin clear. No significant rash, abnormal pigmentation or lesions.  NEURO: Cranial nerves grossly intact.  Mentation and speech appropriate for age.  PSYCH: Mentation appears normal, affect normal/bright, judgement and insight intact, normal speech and appearance well-groomed.        Sincerely,    Lisa Andre NP      30 minutes spent by me on the date of the encounter doing chart review, history and exam, documentation and further activities per the note  "

## 2023-06-06 NOTE — PROGRESS NOTES
Virtual Visit Details    Type of service:  Video Visit     Originating Location (pt. Location): Home    Distant Location (provider location):  Off-site  Platform used for Video Visit: Sharon

## 2023-06-06 NOTE — ASSESSMENT & PLAN NOTE
Plan:  Increase wegovy to 1.7mg x 4 weeks  Increase to wegovy 2.4mg after that unless having side effects   Start with smaller portions   Great job with healthier consistent meals  Could consider lighter lunch   Could look for higher protein dips with veggies  Or consider making bigger dinner to plan for leftovers  Follow up 3 months

## 2023-06-06 NOTE — LETTER
2023       RE: Kaleigh Nam  [address] [facility] [address]     Dear Colleague,    Thank you for referring your patient, Kaleigh Nam, to the [facility] WEIGHT MANAGEMENT CLINIC [place] at [facility]. Please see a copy of my visit note below.      Return Medical Weight Management Note     Kaleigh Nam  MRN:  [id number]  :  1975  ELIAS:  2023    Dear Dusty Bang MD,    I had the pleasure of seeing your patient Kaleigh Nam. She is a 48 year old female who I am continuing to see for treatment of obesity related to:        2021     8:56 PM   --   I have the following health issues associated with obesity Pre-Diabetes    GERD (Reflux)    Stress Incontinence    Osteoarthritis (joint disease)    Hypothyroidism   I have the following symptoms associated with obesity Knee Pain    Depression    Lower Extremity Swelling    Back Pain    Fatigue    Groin Rash    Hip Pain       Assessment & Plan   Problem List Items Addressed This Visit          Digestive    Class 3 severe obesity due to excess calories with serious comorbidity and body mass index (BMI) of 40.0 to 44.9 in adult (H) - Primary     Plan:  Increase wegovy to 1.7mg x 4 weeks  Increase to wegovy 2.4mg after that unless having side effects   Start with smaller portions   Great job with healthier consistent meals  Could consider lighter lunch   Could look for higher protein dips with veggies  Or consider making bigger dinner to plan for leftovers  Follow up 3 months          Relevant Medications    Semaglutide-Weight Management (WEGOVY) 1.7 MG/0.75ML pen    Semaglutide-Weight Management (WEGOVY) 2.4 MG/0.75ML pen (Start on 2023)          INTERVAL HISTORY:  Last seen 3/2023 - working as family to cook more and start healthy lifestyle.       Anti-obesity medications:     Current:   wegovy 1mg - no side effects,     Recent diet changes:   No alcohol    Hunger improved   Breakfast- scrambled eggs and potato OR greek yogurt with berries and granola   Lunch- struggles - sandwiches (josé antonio johns) or salad or leftovers   Dinner- sheet pan meals, grilling, fajitas etc   Not really eating between meals- sometimes an apple  Ice cream a few times a week      Recent exercise/activity changes:   PTfor knee pain 1/2023 some benefit   Most of weight carried in hip/ thighs   Stairs are difficult     Recent stressors:   Very busy     Vitamins/Labs: 12/2023 wnl     CURRENT WEIGHT:   274 lbs 0 oz    Initial Weight (lbs): 275 lbs  Last Visits Weight: 124.7 kg (275 lb)  Cumulative weight loss (lbs): 1  Weight Loss Percentage: 0.36%        6/6/2023     6:25 AM   Changes and Difficulties   I have made the following changes to my diet since my last visit: Eliminated alcohol, less bread   With regards to my diet, I am still struggling with: Finding healthy options when busy   I have made the following changes to my activity/exercise since my last visit: Walking more   With regards to my activity/exercise, I am still struggling with: Joint pain       MEDICATIONS:   Current Outpatient Medications   Medication Sig Dispense Refill    clindamycin (CLINDAMAX) 1 % external gel Apply to AA QD 60 g 1    DULoxetine (CYMBALTA) 60 MG capsule TAKE ONE CAPSULE BY MOUTH ONCE DAILY 90 capsule 3    estradiol (VIVELLE-DOT) 0.05 MG/24HR bi-weekly patch Place 1 patch onto the skin twice a week 24 patch 2    ferrous sulfate (FEROSUL) 325 (65 Fe) MG tablet Take 1 tablet (325 mg) by mouth daily (with breakfast) 90 tablet 3    levothyroxine (SYNTHROID/LEVOTHROID) 137 MCG tablet Take 1 tablet (137 mcg) by mouth daily 90 tablet 3    progesterone (PROMETRIUM) 100 MG capsule Take 100 mg for 12 days, starting on day 14 of your cycle (period starts day 1). 90 capsule 0    Semaglutide-Weight Management (WEGOVY) 1.7 MG/0.75ML pen Inject 1.7 mg Subcutaneous every 7 days 3 mL 0    [START ON 7/6/2023] Semaglutide-Weight  Management (WEGOVY) 2.4 MG/0.75ML pen Inject 2.4 mg Subcutaneous every 7 days 3 mL 3    traZODone (DESYREL) 50 MG tablet Take 1-2 tablets ( mg) by mouth nightly as needed for sleep 90 tablet 3    Cholecalciferol (VITAMIN D3) 50 MCG (2000 UT) CAPS  (Patient not taking: Reported on 6/6/2023)      triamcinolone (KENALOG) 0.1 % external cream Apply to AA on the hands and arms BID x 1-2 week then PRN only (Patient not taking: Reported on 6/6/2023) 80 g 2           6/6/2023     6:25 AM   Weight Loss Medication History Reviewed With Patient   Which weight loss medications are you currently taking on a regular basis? Wegovy   Are you having any side effects from the weight loss medication that we have prescribed you? No       Office Visit on 12/09/2022   Component Date Value Ref Range Status    Interpretation 12/09/2022 Negative for Intraepithelial Lesion or Malignancy (NILM)    Final    Comment 12/09/2022    Final                    Value:This result contains rich text formatting which cannot be displayed here.    Specimen Adequacy 12/09/2022 Satisfactory for evaluation, endocervical/transformation zone component present   Final    Clinical Information 12/09/2022    Final                    Value:This result contains rich text formatting which cannot be displayed here.    Reflex Testing 12/09/2022 Yes regardless of result   Final    Previous Abnormal? 12/09/2022    Final                    Value:This result contains rich text formatting which cannot be displayed here.    Performing Labs 12/09/2022    Final                    Value:This result contains rich text formatting which cannot be displayed here.    Hemoglobin A1C 12/09/2022 5.4  0.0 - 5.6 % Final    Normal <5.7%   Prediabetes 5.7-6.4%    Diabetes 6.5% or higher     Note: Adopted from ADA consensus guidelines.    Sodium 12/09/2022 139  136 - 145 mmol/L Final    Potassium 12/09/2022 4.4  3.4 - 5.3 mmol/L Final    Chloride 12/09/2022 103  98 - 107 mmol/L Final  "   Carbon Dioxide (CO2) 12/09/2022 22  22 - 29 mmol/L Final    Anion Gap 12/09/2022 14  7 - 15 mmol/L Final    Urea Nitrogen 12/09/2022 17.3  6.0 - 20.0 mg/dL Final    Creatinine 12/09/2022 0.74  0.51 - 0.95 mg/dL Final    Calcium 12/09/2022 9.0  8.6 - 10.0 mg/dL Final    Glucose 12/09/2022 94  70 - 99 mg/dL Final    Alkaline Phosphatase 12/09/2022 84  35 - 104 U/L Final    AST 12/09/2022 22  10 - 35 U/L Final    ALT 12/09/2022 13  10 - 35 U/L Final    Protein Total 12/09/2022 7.3  6.4 - 8.3 g/dL Final    Albumin 12/09/2022 4.2  3.5 - 5.2 g/dL Final    Bilirubin Total 12/09/2022 0.4  <=1.2 mg/dL Final    GFR Estimate 12/09/2022 >90  >60 mL/min/1.73m2 Final    Effective December 21, 2021 eGFRcr in adults is calculated using the 2021 CKD-EPI creatinine equation which includes age and gender (Chaitanya et al., NEJ, DOI: 10.1056/ROGIgj2829267)    TSH 12/09/2022 2.78  0.30 - 4.20 uIU/mL Final    Other HR HPV 12/09/2022 Negative  Negative Final    HPV16 DNA 12/09/2022 Negative  Negative Final    HPV18 DNA 12/09/2022 Negative  Negative Final    FINAL DIAGNOSIS 12/09/2022    Final                    Value:This result contains rich text formatting which cannot be displayed here.       PHYSICAL EXAM:  Objective    Ht 1.676 m (5' 6\")   Wt 124.3 kg (274 lb)   BMI 44.22 kg/m      Vitals - Patient Reported  Pain Score: Mild Pain (2)  Pain Loc: Knee (left knee)      Vitals:  No vitals were obtained today due to virtual visit.    GENERAL: Healthy, alert and no distress  EYES: Eyes grossly normal to inspection.  No discharge or erythema, or obvious scleral/conjunctival abnormalities.  RESP: No audible wheeze, cough, or visible cyanosis.  No visible retractions or increased work of breathing.    SKIN: Visible skin clear. No significant rash, abnormal pigmentation or lesions.  NEURO: Cranial nerves grossly intact.  Mentation and speech appropriate for age.  PSYCH: Mentation appears normal, affect normal/bright, judgement and insight " intact, normal speech and appearance well-groomed.        Sincerely,    Lisa Andre NP      30 minutes spent by me on the date of the encounter doing chart review, history and exam, documentation and further activities per the note    Virtual Visit Details    Type of service:  Video Visit     Originating Location (pt. Location): Home    Distant Location (provider location):  Off-site  Platform used for Video Visit: [employer]

## 2023-06-06 NOTE — TELEPHONE ENCOUNTER
PA Initiation    Medication: WEGOVY 1.7 MG/0.75ML SC SOAJ  Insurance Company: [employer] - Phone [phone] Fax [phone]  Pharmacy Filling the Rx:    Filling Pharmacy Phone:    Filling Pharmacy Fax:    Start Date: 6/6/2023

## 2023-06-07 NOTE — TELEPHONE ENCOUNTER
Prior Authorization Approval  Rx's sent to pharmacy    Medication: WEGOVY 1.7 MG/0.75ML SC SOAJ  Authorization Effective Date: 6/6/2023  Authorization Expiration Date: 6/5/2024  Approved Dose/Quantity: 3  Reference #: [id number]   Insurance Company: [employer] - Phone [phone] Fax [phone]  Expected CoPay:       CoPay Card Available:      Financial Assistance Needed: no  Which Pharmacy is filling the prescription:    Pharmacy Notified:    Patient Notified:

## 2023-07-31 ENCOUNTER — OFFICE VISIT (OUTPATIENT)
Dept: DERMATOLOGY | Facility: CLINIC | Age: 48
End: 2023-07-31
Payer: COMMERCIAL

## 2023-07-31 DIAGNOSIS — L71.9 ROSACEA: Primary | ICD-10-CM

## 2023-07-31 DIAGNOSIS — L81.4 LENTIGINES: ICD-10-CM

## 2023-07-31 DIAGNOSIS — L73.2 HIDRADENITIS SUPPURATIVA: ICD-10-CM

## 2023-07-31 PROCEDURE — 99214 OFFICE O/P EST MOD 30 MIN: CPT | Performed by: STUDENT IN AN ORGANIZED HEALTH CARE EDUCATION/TRAINING PROGRAM

## 2023-07-31 RX ORDER — CLINDAMYCIN PHOSPHATE 10 MG/G
GEL TOPICAL 2 TIMES DAILY
Qty: 60 G | Refills: 11 | Status: SHIPPED | OUTPATIENT
Start: 2023-07-31 | End: 2024-02-22

## 2023-07-31 NOTE — LETTER
7/31/2023         RE: Kaleigh Nam  [address] [facility] [address]        Dear Colleague,    Thank you for referring your patient, Kaleigh Nam, to the [facility]. Please see a copy of my visit note below.    [facility] Dermatology Note    Encounter Date: Jul 31, 2023    Dermatology Problem List:  #HS; hibiclex, clinda gel     Major PMHx  -   ______________________________________    Impression/Plan:  Kaleigh was seen today for derm problem.    Diagnoses and all orders for this visit:    Rosacea  Lentigines  - discussed sunprotective behaviors, clothing, and the use of sunscreen    Hidradenitis suppurativa  -     clindamycin (CLINDAMAX) 1 % external gel; Apply topically 2 times daily  - mild-uses clindamycin as needed, inframammary areas   - Advised to call if it becomes uncontrolled and we can tweak her treatment regimen        Follow-up PRN.       Staff Involved:  Staff Only    Dani Colvin MD   of Dermatology  Department of Dermatology  [facility]      CC:   Chief Complaint   Patient presents with     Derm Problem     fbs       History of Present Illness:  Ms. Kaleigh Nam is a 48 year old female who presents as a return patient.    Presents for examination for spots on trunk on extremities     Labs:      Physical exam:  Vitals: There were no vitals taken for this visit.  GEN: well developed, well-nourished, in no acute distress, in a pleasant mood.     SKIN: Porter phototype 1  - Full skin, which includes the head/face, both arms, chest, back, abdomen,both legs, genitalia and/or groin buttocks, digits and/or nails, was examined.  - Flat brown macules and patches in a sun exposed areas on face and extremities.  - scattered brown papules on trunk and extremities   - No other lesions of concern on areas examined.     Past Medical History:   Past Medical History:   Diagnosis  Date     Allergic rhinitis, cause unspecified      Anxiety state, unspecified      Depressive disorder      Graves's Disease      Moderate episode of recurrent major depressive disorder (H) 2018     Multinodular Goiter      Severe pre-eclampsia, unspecified as to episode of care      Past Surgical History:   Procedure Laterality Date     GENITOURINARY SURGERY      3- csections     HC THYROIDECTOMY       ORTHOPEDIC SURGERY      Left meniscus     AL THYROIDECTOMY      Description: Thyroid Surgery Total Thyroidectomy;  Recorded: 2012;     ZZC  DELIVERY ONLY      , Low Cervical     ZZC  DELIVERY ONLY      , Low Cervical     ZZC  DELIVERY ONLY      , Low Cervical     ZZC  DELIVERY ONLY      Description:  Section;  Recorded: 2012;  Annotations: 1998, , 2005       Social History:   reports that she has never smoked. She has never used smokeless tobacco. She reports current alcohol use of about 3.0 standard drinks of alcohol per week. She reports that she does not use drugs.    Family History:  Family History   Problem Relation Age of Onset     Skin Cancer Father      Thyroid Disease Mother      Other - See Comments Mother         prediabetes     Osteoporosis Mother      Depression Mother      Anxiety Disorder Mother      Skin Cancer Mother      Hypertension Sister      Diabetes Sister      Other - See Comments Sister         Lipadema      Hypertension Sister      Depression Sister      Anxiety Disorder Sister      Congenital Anomalies Son         autism, cognitive delay       Medications:  Current Outpatient Medications   Medication Sig Dispense Refill     clindamycin (CLINDAMAX) 1 % external gel Apply topically 2 times daily 60 g 11     clindamycin (CLINDAMAX) 1 % external gel Apply to AA QD 60 g 1     DULoxetine (CYMBALTA) 60 MG capsule TAKE ONE CAPSULE BY MOUTH ONCE DAILY 90 capsule 3     estradiol (VIVELLE-DOT)  0.05 MG/24HR bi-weekly patch Place 1 patch onto the skin twice a week 24 patch 2     ferrous sulfate (FEROSUL) 325 (65 Fe) MG tablet Take 1 tablet (325 mg) by mouth daily (with breakfast) 90 tablet 3     levothyroxine (SYNTHROID/LEVOTHROID) 137 MCG tablet Take 1 tablet (137 mcg) by mouth daily 90 tablet 3     progesterone (PROMETRIUM) 100 MG capsule Take 100 mg for 12 days, starting on day 14 of your cycle (period starts day 1). 90 capsule 0     Semaglutide-Weight Management (WEGOVY) 1.7 MG/0.75ML pen Inject 1.7 mg Subcutaneous every 7 days 3 mL 0     Semaglutide-Weight Management (WEGOVY) 2.4 MG/0.75ML pen Inject 2.4 mg Subcutaneous every 7 days 3 mL 3     traZODone (DESYREL) 50 MG tablet Take 1-2 tablets ( mg) by mouth nightly as needed for sleep 90 tablet 3     Cholecalciferol (VITAMIN D3) 50 MCG (2000 UT) CAPS  (Patient not taking: Reported on 6/6/2023)       triamcinolone (KENALOG) 0.1 % external cream Apply to AA on the hands and arms BID x 1-2 week then PRN only (Patient not taking: Reported on 6/6/2023) 80 g 2     Allergies   Allergen Reactions     No Known Drug Allergy                  Again, thank you for allowing me to participate in the care of your patient.        Sincerely,        Dani Colvin MD

## 2023-07-31 NOTE — PROGRESS NOTES
[facility] Dermatology Note    Encounter Date: Jul 31, 2023    Dermatology Problem List:  #HS; hibiclex, clinda gel     Major PMHx  -   ______________________________________    Impression/Plan:  Kaleigh was seen today for derm problem.    Diagnoses and all orders for this visit:    Rosacea  Lentigines  - discussed sunprotective behaviors, clothing, and the use of sunscreen    Hidradenitis suppurativa  -     clindamycin (CLINDAMAX) 1 % external gel; Apply topically 2 times daily  - mild-uses clindamycin as needed, inframammary areas   - Advised to call if it becomes uncontrolled and we can tweak her treatment regimen        Follow-up PRN.       Staff Involved:  Staff Only    Dani Colvin MD   of Dermatology  Department of Dermatology  [facility]      CC:   Chief Complaint   Patient presents with    Derm Problem     fbs       History of Present Illness:  Ms. Kaleigh Nam is a 48 year old female who presents as a return patient.    Presents for examination for spots on trunk on extremities     Labs:      Physical exam:  Vitals: There were no vitals taken for this visit.  GEN: well developed, well-nourished, in no acute distress, in a pleasant mood.     SKIN: Porter phototype 1  - Full skin, which includes the head/face, both arms, chest, back, abdomen,both legs, genitalia and/or groin buttocks, digits and/or nails, was examined.  - Flat brown macules and patches in a sun exposed areas on face and extremities.  - scattered brown papules on trunk and extremities   - No other lesions of concern on areas examined.     Past Medical History:   Past Medical History:   Diagnosis Date    Allergic rhinitis, cause unspecified     Anxiety state, unspecified     Depressive disorder 2003    Graves's Disease     Moderate episode of recurrent major depressive disorder (H) 12/6/2018    Multinodular Goiter     Severe pre-eclampsia, unspecified as to  episode of care      Past Surgical History:   Procedure Laterality Date    GENITOURINARY SURGERY      3- csections    HC THYROIDECTOMY      ORTHOPEDIC SURGERY      Left meniscus    MS THYROIDECTOMY      Description: Thyroid Surgery Total Thyroidectomy;  Recorded: 2012;    ZZC  DELIVERY ONLY      , Low Cervical    ZZC  DELIVERY ONLY      , Low Cervical    ZZC  DELIVERY ONLY      , Low Cervical    ZZC  DELIVERY ONLY      Description:  Section;  Recorded: 2012;  Annotations: 1998, , 2005       Social History:   reports that she has never smoked. She has never used smokeless tobacco. She reports current alcohol use of about 3.0 standard drinks of alcohol per week. She reports that she does not use drugs.    Family History:  Family History   Problem Relation Age of Onset    Skin Cancer Father     Thyroid Disease Mother     Other - See Comments Mother         prediabetes    Osteoporosis Mother     Depression Mother     Anxiety Disorder Mother     Skin Cancer Mother     Hypertension Sister     Diabetes Sister     Other - See Comments Sister         Lipadema     Hypertension Sister     Depression Sister     Anxiety Disorder Sister     Congenital Anomalies Son         autism, cognitive delay       Medications:  Current Outpatient Medications   Medication Sig Dispense Refill    clindamycin (CLINDAMAX) 1 % external gel Apply topically 2 times daily 60 g 11    clindamycin (CLINDAMAX) 1 % external gel Apply to AA QD 60 g 1    DULoxetine (CYMBALTA) 60 MG capsule TAKE ONE CAPSULE BY MOUTH ONCE DAILY 90 capsule 3    estradiol (VIVELLE-DOT) 0.05 MG/24HR bi-weekly patch Place 1 patch onto the skin twice a week 24 patch 2    ferrous sulfate (FEROSUL) 325 (65 Fe) MG tablet Take 1 tablet (325 mg) by mouth daily (with breakfast) 90 tablet 3    levothyroxine (SYNTHROID/LEVOTHROID) 137 MCG tablet Take 1 tablet (137 mcg) by mouth daily 90 tablet  3    progesterone (PROMETRIUM) 100 MG capsule Take 100 mg for 12 days, starting on day 14 of your cycle (period starts day 1). 90 capsule 0    Semaglutide-Weight Management (WEGOVY) 1.7 MG/0.75ML pen Inject 1.7 mg Subcutaneous every 7 days 3 mL 0    Semaglutide-Weight Management (WEGOVY) 2.4 MG/0.75ML pen Inject 2.4 mg Subcutaneous every 7 days 3 mL 3    traZODone (DESYREL) 50 MG tablet Take 1-2 tablets ( mg) by mouth nightly as needed for sleep 90 tablet 3    Cholecalciferol (VITAMIN D3) 50 MCG (2000 UT) CAPS  (Patient not taking: Reported on 6/6/2023)      triamcinolone (KENALOG) 0.1 % external cream Apply to AA on the hands and arms BID x 1-2 week then PRN only (Patient not taking: Reported on 6/6/2023) 80 g 2     Allergies   Allergen Reactions    No Known Drug Allergy

## 2023-08-01 PROBLEM — L73.2 HIDRADENITIS SUPPURATIVA: Status: ACTIVE | Noted: 2023-08-01

## 2023-10-12 NOTE — PATIENT INSTRUCTIONS
"Hi Lisa Andre NP, it was nice to meet you today!  Thank you for allowing us the privilege of caring for you. We hope we provided you with the excellent service you deserve.   Please let us know if there is anything else we can do for you so that we can be sure you are completely satisfied with your care experience.    To ensure the quality of our services you may be receiving a patient satisfaction survey from an independent patient satisfaction monitoring company.    The greatest compliment you can give is a \"Likely to Recommend\"    Your visit was with Lisa Andre NP today.    Instructions per today's visit:     Plan:  Increase wegovy to 1.7mg x 4 weeks  Increase to wegovy 2.4mg after that unless having side effects   Start with smaller portions   Great job with healthier consistent meals  Could consider lighter lunch   Could look for higher protein dips with veggies  Or consider making bigger dinner to plan for leftovers  Follow up 3 months   ___________________________________________________________________________  Important contact and scheduling information:  Please call our contact center at [phone] to schedule your next appointments.  For any nursing questions or concerns call Denita Gutiérerz LPN at [phone] or Jadyn Rodgers RN at [phone]  Please call during clinic hours Monday through Friday 8:00a - 4:00p if you have questions or you can contact us via [employer] at anytime and we will reply during clinic hours.    Lab results will be communicated through My Chart or letter (if My Chart not used). Please call the clinic if you have not received communication after 1 week or if you have any questions.?  Clinic Fax: [phone]  __________________________________________________________________________    If labs were ordered today:    Please make an appointment to have them drawn at your convenience.     To schedule the Lab Appointment using [employer]:  Select \"Schedule an Appointment\"  Select \"Lab " [phone] sister, samanta called at this time, awaiting family member  "Only\"  For \"A couple of questions\", select \"Other\"  For \"Which locations work for you?, select the location and set up the appointment    To schedule by phone call [phone] to schedule a lab only appointment at any [facility] lab.  ___________________________________________________________________________  Work with A Health !  Virtual Sessions are Available through [facility] Management Clinics    To learn more, call to schedule a free, Health  Q&A appointment: [phone]     What is Health Coaching?  Do you know what you are supposed to do, but you just aren't doing it?  Then, HEALTH COACHING may help you!   Get unstuck and move forward with the support of a professionally trained NBC-HWC (National Board-Certified Health and ) who uses evidence-based approaches to help you move forward with healthy lifestyle changes in the areas of weight loss, stress management and overall well-being.    Health Coaches help you identify goals that will work best for you. Health Coaches provide support and encouragement with overcoming barriers and help you to find inspiration and motivation to lead a healthy lifestyle.    Option one:  Health Coaching 3-Pack; Three, 30-minute Health Coaching Visits, for $99  Visits are done virtually (phone or video)  This is a self pay service; we do not accept insurance for marjorie coaching.    Option two:   The 24 week Plan; 11 Health Coaching Visits, and a 7 months subscription to [employer]-- on-demand fitness, nutrition and mindfulness classes, for $499 (employee discounts may be available). Participants will also meet regularly with a weight management Medical Provider and a Registered/Licensed Dietician.  This is a self-pay service; we do not accept insurance for health coaching.    To Schedule a free Health  Q&A appointment to learn more,  call [phone].  ____________________________________________________________________    M " [facility]   Healthy Lifestyle Virtual Support Group    Healthy Lifestyle Virtual Support Group?  This is 60 minutes of small group guided discussion, support and resources. All are welcome who want a healthy lifestyle.  WHEN: Starting in July 2023, this group meets the 1st Friday of the month from 12:30 PM - 1:30 PM virtually using [employer] Teams.    FACILITATOR: Led by National Board Certified Health and , Kimmie Suero [facility].   TO REGISTER: Please send an email to [email or web address] to receive monthly invites to the group or if you have any questions about having a health .  Prior to the meeting, a link with directions on how to join the meeting will be sent to you.    2023 Meetings  May 19: Let's Talk  June 9: Create Your Coaching Toolkit: Learn How to  Yourself  July 7: Let's Talk  August 4: Benefits of Fiber with CARMITA Rodriguez  September 1: Show and Tell (share your aps, podcasts, recipes, hacks, books)  October 6 :Let's Talk  November 3: Introduction to Mindfulness   December 1: Let's Talk    If you would like bariatric surgery specific support group info please let your care team know.         Thank you,   M [facility] Comprehensive Weight Management Team

## 2023-11-02 ENCOUNTER — VIRTUAL VISIT (OUTPATIENT)
Dept: ENDOCRINOLOGY | Facility: CLINIC | Age: 48
End: 2023-11-02
Payer: COMMERCIAL

## 2023-11-02 VITALS — HEIGHT: 66 IN | BODY MASS INDEX: 40.82 KG/M2 | WEIGHT: 254 LBS

## 2023-11-02 DIAGNOSIS — E66.813 CLASS 3 SEVERE OBESITY DUE TO EXCESS CALORIES WITH SERIOUS COMORBIDITY AND BODY MASS INDEX (BMI) OF 40.0 TO 44.9 IN ADULT (H): Primary | ICD-10-CM

## 2023-11-02 DIAGNOSIS — E66.01 CLASS 3 SEVERE OBESITY DUE TO EXCESS CALORIES WITH SERIOUS COMORBIDITY AND BODY MASS INDEX (BMI) OF 40.0 TO 44.9 IN ADULT (H): Primary | ICD-10-CM

## 2023-11-02 PROCEDURE — 99214 OFFICE O/P EST MOD 30 MIN: CPT | Mod: VID | Performed by: NURSE PRACTITIONER

## 2023-11-02 RX ORDER — SEMAGLUTIDE 2.4 MG/.75ML
2.4 INJECTION, SOLUTION SUBCUTANEOUS
Qty: 3 ML | Refills: 3 | Status: SHIPPED | OUTPATIENT
Start: 2023-11-02 | End: 2024-01-15

## 2023-11-02 ASSESSMENT — PAIN SCALES - GENERAL: PAINLEVEL: NO PAIN (0)

## 2023-11-02 NOTE — PATIENT INSTRUCTIONS
"Thank you for allowing us the privilege of caring for you. We hope we provided you with the excellent service you deserve.   Please let us know if there is anything else we can do for you so that we can be sure you are completely satisfied with your care experience.    To ensure the quality of our services you may be receiving a patient satisfaction survey from an independent patient satisfaction monitoring company.    The greatest compliment you can give is a \"Likely to Recommend\"    Your visit was with Lisa Andre NP today.    Instructions per today's visit:     Dimitry Nam, it was great to visit with you today.  Here is a review of our visit.  If our clinic scheduler is not able to reach you please call [phone] to schedule your next appointments.    Plan:  Continue wegovy 2.4mg   Continue to look for ways to add protein- lentils? High protein grains? Protein supplement?  Avoid excessive hunger with small snack instead of meal if not hungry- fuel rather than pleasure   Great work with hydration   Great work prepping meals a head of time!   Great work finding ways to be more active as a family!   Follow up 3 months   Labs 2/2023 with pcp      Information about Video Visits with [facility]: video visit information  _________________________________________________________________________________________________________________________________________________________  If you are asked by your clinic team to have your blood pressure checked:  [facility] do offer several locations for blood pressure checks. Please follow the below link to schedule an appointment. Scheduling an appointment at the pharmacy for a blood pressure check is now preferred.    Appointment Plus (appointment-plus.Delivered)  _________________________________________________________________________________________________________________________________________________________  Important contact and scheduling " information:  Please call our contact center at [phone] to schedule your next appointments.  To find a lab location near you, please call [phone].  For any nursing questions or concerns call Denita Gutiérrez LPN at [phone] or Jadyn Rodgers RN at [phone]  Please call during clinic hours Monday through Friday 8:00a - 4:00p if you have questions or you can contact us via [employer] at anytime and we will reply during clinic hours.    Lab results will be communicated through My Chart or letter (if My Chart not used). Please call the clinic if you have not received communication after 1 week or if you have any questions.?  Clinic Fax: [phone]    _________________________________________________________________________________________________________________________________________________________  Meal Replacement Products:    Here is the link to our new e-store where you can purchase our meal replacement products    [facility] E-Store  [email or web address]    The one week starter kit is a great way to sample a variety of products and see what works for you.    If you want more information about the product go to: [email or web address]    If you are an employee or [facility] or [facility] please contact your care team for a 10% estore discount    Free Shipping for orders over $75     Benefits of meal replacements products:    Portion and calorie control  Improved nutrition  Structured eating  Simplified food choices  Avoid contact with trigger foods  _________________________________________________________________________________________________________________________________________________________  Interested in working with a health ?  Health coaches work with you to improve your overall health and wellbeing.  They look at the whole person, and may involve discussion of different areas of life, including, but not limited to the four  pillars of health (sleep, exercise, nutrition, and stress management). Discuss with your care team if you would like to start working a health .  Health Coaching-3 Pack: Schedule by calling [phone]    $99 for three health coaching visits    Visits may be done in person or via phone    Coaching is a partnership between the  and the client; Coaches do not prescribe or diagnose    Coaching helps inspire the client to reach his/her personal goals   _________________________________________________________________________________________________________________________________________________________  24 Week Healthy Lifestyle Plan:    Our mission in the 24-week Healthy Lifestyle Plan is to provide you with individualized care by giving you the tools, education and support you need to lose weight and maintain a healthy lifestyle. In your 24-week journey, you ll be supported by a dedicated weight loss team that includes registered dietitians, medical weight management providers, health coaches, and nurses -- all with special expertise in weight loss -- to help you every step of the way.     Monthly meetings with your registered dietician or medical weight management provider help to review your progress, update your care plan, and make any adjustments needed to ensure success. Between these visits, weekly and bi-weekly health  visits will help you focus on the four pillars of weight loss -- stress, sleep, nutrition, and exercise -- and how you can best adapt each to achieve sustainable weight loss results.    In addition, you will be given exclusive access to online wellbeing classes through [employer].  Your initial visit will be with a medical weight management provider who will help to understand your weight loss goals and ensure this program is the right fit for you. Please let our team know if you are interested in the 24 week plan by sending a message to your care team or calling [phone] to  schedule.  _________________________________________________________________________________________________________________________________________________________  __________  [facility] Get Moving Program  Our team of physical therapists is trained to help you understand and take control of your condition. They will perform a thorough evaluation to determine your ability for activity and develop a customized plan to fit your goals and physical ability.  Scheduling: Unsure if the Get Moving program is right for you? Discuss the program with your medical provider or diabetes educator. You can also call us at [phone] to ask questions or schedule an appointment.   [facility] Get Moving Program  ____________________________________________________________________________________________________________________________________________________________________________   [employer] Diabetes Prevention Program (DPP)  If you have prediabetes and Medicare please contact us via [employer] to learn more about the Diabetes Prevention Program (DPP)  Program Details:   [employer] offers the year-long Diabetes Prevention Program (DPP). The program helps you to make lifestyle changes that prevent or delay type 2 diabetes by supporting healthy eating, increased physical activity, stress reduction and use of coping skills.   On average, previous [facility] DPP cohorts have lost and maintained at least 5% of their starting weight throughout the program and averaged more than 150 minutes of physical activity per week.  Participants meet weekly for one-hour group sessions over sixteen weeks, every other week for the next 8 weeks, and monthly for the last six months.   A year-long maintenance program is also available for participants who complete the first year.   Location & Cost:   During the COVID-19 Public Health Emergency, the program is offered virtually. When in-person classes can resume, they  will be held at [facility].  For people with Medicare, the program is covered in full. A self-pay option will also be available for those with non-Medicare insurance plans.   ______________________________________________________________________________________________________________________________________________________________________________________________________________________________    To work with a Behavioral Health Psychologist:    Call to schedule:    Jean Lazaro - [phone]  Valeria Jurado - [phone]  Lidia Love - [phone]  Padmini Brandon - [phone]   Janeth Carrera PhD (cannot accept Medicare) [phone]        Thank you,   [facility] Comprehensive Weight Management Team

## 2023-11-02 NOTE — LETTER
2023       RE: Kaleigh Nam  [address] [address]     Dear Colleague,    Thank you for referring your patient, Kaleigh Nam, to the [facility] WEIGHT MANAGEMENT CLINIC [facility]. Please see a copy of my visit note below.      Return Medical Weight Management Note     Kaleigh Nam  MRN:  [id number]  :  1975  ELIAS:  2023    Dear Dusty Bang MD,    I had the pleasure of seeing your patient Kaleigh Nam. She is a 48 year old female who I am continuing to see for treatment of obesity related to:        2021     8:56 PM   --   I have the following health issues associated with obesity Pre-Diabetes    GERD (Reflux)    Stress Incontinence    Osteoarthritis (joint disease)    Hypothyroidism   I have the following symptoms associated with obesity Knee Pain    Depression    Lower Extremity Swelling    Back Pain    Fatigue    Groin Rash    Hip Pain       Assessment & Plan   Problem List Items Addressed This Visit          Digestive    Class 3 severe obesity due to excess calories with serious comorbidity and body mass index (BMI) of 40.0 to 44.9 in adult (H) - Primary     Tolerating wegovy 2.4mg well. Adverse effects just with specific food triggers. Working to avoid hunger which occurs mostly when going too long without eating- not eating because not hungry and nothing sounds good. Continues to work on protein. Hydration adequate. Discussed strategies.     Plan:  Continue wegovy 2.4mg   Continue to look for ways to add protein- lentils? High protein grains? Protein supplement?  Avoid excessive hunger with small snack instead of meal if not hungry- fuel rather than pleasure   Great work with hydration   Great work prepping meals a head of time!   Great work finding ways to be more active as a family!   Follow up 3 months   Labs 2023 with pcp            Relevant Medications     "Semaglutide-Weight Management (WEGOVY) 2.4 MG/0.75ML pen    Other Relevant Orders    CBC with platelets    Comprehensive metabolic panel    Hemoglobin A1c    Lipid panel reflex to direct LDL Fasting          INTERVAL HISTORY:  New MWM 6/2021 BMI 44 with prediabetes, gerd, joint pain, hypothyroidism, fatigue, depression. Gap in follow up with the loss of son, restarted wegovy 12/2022 (280lb, BMI 45).    Last seen 6/6/2023 - taper up to 2.4mg, tolerating 1mg well.     Anti-obesity medication history    Current:   wegovy- taper up to 2.4mg over the summer   -nausea and diarrhea with greasy foods   -less satisfaction with cravings   -cravings more controllable   -food seems \"less important\"     Recent diet changes:   Less cravings for greasy foods   Packing back up snacks when out of the house   Hard time eating anything if not hungry at a meal   Lots of fruit and veggies   Struggling some with meat   Meal prepping for work     Recent exercise/activity changes:   Talking about how to get more active  Walking more as activities     Recent stressors:    Travelling back and forth from [place] for work in [place] (4 hours)     Vitamins/Labs: will check with pcp in feb     CURRENT WEIGHT:   254 lbs 0 oz    Initial Weight (lbs): 275 lbs  Last Visits Weight: 124.3 kg (274 lb)  Cumulative weight loss (lbs): 21  Weight Loss Percentage: 7.64%        11/2/2023     6:46 AM   Changes and Difficulties   I have made the following changes to my diet since my last visit: None   With regards to my diet, I am still struggling with: None   I have made the following changes to my activity/exercise since my last visit: None   With regards to my activity/exercise, I am still struggling with: None         MEDICATIONS:   Current Outpatient Medications   Medication Sig Dispense Refill    Cholecalciferol (VITAMIN D3) 50 MCG (2000 UT) CAPS       clindamycin (CLINDAMAX) 1 % external gel Apply topically 2 times daily 60 g 11    clindamycin (CLINDAMAX) 1 % " external gel Apply to AA QD 60 g 1    DULoxetine (CYMBALTA) 60 MG capsule TAKE ONE CAPSULE BY MOUTH ONCE DAILY 90 capsule 3    estradiol (VIVELLE-DOT) 0.05 MG/24HR bi-weekly patch Place 1 patch onto the skin twice a week 24 patch 2    ferrous sulfate (FEROSUL) 325 (65 Fe) MG tablet Take 1 tablet (325 mg) by mouth daily (with breakfast) 90 tablet 3    levothyroxine (SYNTHROID/LEVOTHROID) 137 MCG tablet Take 1 tablet (137 mcg) by mouth daily 90 tablet 3    progesterone (PROMETRIUM) 100 MG capsule Take 100 mg for 12 days, starting on day 14 of your cycle (period starts day 1). 90 capsule 0    Semaglutide-Weight Management (WEGOVY) 1.7 MG/0.75ML pen Inject 1.7 mg Subcutaneous every 7 days 3 mL 0    Semaglutide-Weight Management (WEGOVY) 2.4 MG/0.75ML pen Inject 2.4 mg Subcutaneous every 7 days 3 mL 3    Semaglutide-Weight Management (WEGOVY) 2.4 MG/0.75ML pen Inject 2.4 mg Subcutaneous every 7 days 3 mL 3    traZODone (DESYREL) 50 MG tablet Take 1-2 tablets ( mg) by mouth nightly as needed for sleep 90 tablet 3    triamcinolone (KENALOG) 0.1 % external cream Apply to AA on the hands and arms BID x 1-2 week then PRN only (Patient not taking: Reported on 11/2/2023) 80 g 2           11/2/2023     6:46 AM   Weight Loss Medication History Reviewed With Patient   Which weight loss medications are you currently taking on a regular basis? Wegovy   Are you having any side effects from the weight loss medication that we have prescribed you? No       Office Visit on 12/09/2022   Component Date Value Ref Range Status    Interpretation 12/09/2022 Negative for Intraepithelial Lesion or Malignancy (NILM)    Final    Comment 12/09/2022    Final                    Value:This result contains rich text formatting which cannot be displayed here.    Specimen Adequacy 12/09/2022 Satisfactory for evaluation, endocervical/transformation zone component present   Final    Clinical Information 12/09/2022    Final                     Value:This result contains rich text formatting which cannot be displayed here.    Reflex Testing 12/09/2022 Yes regardless of result   Final    Previous Abnormal? 12/09/2022    Final                    Value:This result contains rich text formatting which cannot be displayed here.    Performing Labs 12/09/2022    Final                    Value:This result contains rich text formatting which cannot be displayed here.    Hemoglobin A1C 12/09/2022 5.4  0.0 - 5.6 % Final    Normal <5.7%   Prediabetes 5.7-6.4%    Diabetes 6.5% or higher     Note: Adopted from ADA consensus guidelines.    Sodium 12/09/2022 139  136 - 145 mmol/L Final    Potassium 12/09/2022 4.4  3.4 - 5.3 mmol/L Final    Chloride 12/09/2022 103  98 - 107 mmol/L Final    Carbon Dioxide (CO2) 12/09/2022 22  22 - 29 mmol/L Final    Anion Gap 12/09/2022 14  7 - 15 mmol/L Final    Urea Nitrogen 12/09/2022 17.3  6.0 - 20.0 mg/dL Final    Creatinine 12/09/2022 0.74  0.51 - 0.95 mg/dL Final    Calcium 12/09/2022 9.0  8.6 - 10.0 mg/dL Final    Glucose 12/09/2022 94  70 - 99 mg/dL Final    Alkaline Phosphatase 12/09/2022 84  35 - 104 U/L Final    AST 12/09/2022 22  10 - 35 U/L Final    ALT 12/09/2022 13  10 - 35 U/L Final    Protein Total 12/09/2022 7.3  6.4 - 8.3 g/dL Final    Albumin 12/09/2022 4.2  3.5 - 5.2 g/dL Final    Bilirubin Total 12/09/2022 0.4  <=1.2 mg/dL Final    GFR Estimate 12/09/2022 >90  >60 mL/min/1.73m2 Final    Effective December 21, 2021 eGFRcr in adults is calculated using the 2021 CKD-EPI creatinine equation which includes age and gender (Chaitanya et al., NEJM, DOI: 10.1056/IHVTcx7049628)    TSH 12/09/2022 2.78  0.30 - 4.20 uIU/mL Final    Other HR HPV 12/09/2022 Negative  Negative Final    HPV16 DNA 12/09/2022 Negative  Negative Final    HPV18 DNA 12/09/2022 Negative  Negative Final    FINAL DIAGNOSIS 12/09/2022    Final                    Value:This result contains rich text formatting which cannot be displayed here.           12/16/2019      "2:06 PM 12/9/2022    10:21 AM   TRAVON Score (Last Two)   TRAVON Raw Score 34 36   Activation Score 68.9 75.5   TRAVON Level 3 4         PHYSICAL EXAM:  Objective    Ht 1.676 m (5' 6\")   Wt 115.2 kg (254 lb)   BMI 41.00 kg/m      Vitals - Patient Reported  Pain Score: No Pain (0)        GENERAL: Healthy, alert and no distress  EYES: Eyes grossly normal to inspection.  No discharge or erythema, or obvious scleral/conjunctival abnormalities.  RESP: No audible wheeze, cough, or visible cyanosis.  No visible retractions or increased work of breathing.    SKIN: Visible skin clear. No significant rash, abnormal pigmentation or lesions.  NEURO: Cranial nerves grossly intact.  Mentation and speech appropriate for age.  PSYCH: Mentation appears normal, affect normal/bright, judgement and insight intact, normal speech and appearance well-groomed.        Sincerely,    Lisa Andre NP      30 minutes spent by me on the date of the encounter doing chart review, history and exam, documentation and further activities per the note    Virtual Visit Details    Type of service:  Video Visit     Originating Location (pt. Location): Home    Distant Location (provider location):  Off-site  Platform used for Video Visit: Sharon    "

## 2023-11-02 NOTE — ASSESSMENT & PLAN NOTE
Tolerating wegovy 2.4mg well. Adverse effects just with specific food triggers. Working to avoid hunger which occurs mostly when going too long without eating- not eating because not hungry and nothing sounds good. Continues to work on protein. Hydration adequate. Discussed strategies.     Plan:  Continue wegovy 2.4mg   Continue to look for ways to add protein- lentils? High protein grains? Protein supplement?  Avoid excessive hunger with small snack instead of meal if not hungry- fuel rather than pleasure   Great work with hydration   Great work prepping meals a head of time!   Great work finding ways to be more active as a family!   Follow up 3 months   Labs 2/2023 with pcp

## 2023-11-02 NOTE — PROGRESS NOTES
Return Medical Weight Management Note     Kaleigh Nam  MRN:  [id number]  :  1975  ELIAS:  2023    Dear Dusty Bang MD,    I had the pleasure of seeing your patient Kaleigh Nam. She is a 48 year old female who I am continuing to see for treatment of obesity related to:        2021     8:56 PM   --   I have the following health issues associated with obesity Pre-Diabetes    GERD (Reflux)    Stress Incontinence    Osteoarthritis (joint disease)    Hypothyroidism   I have the following symptoms associated with obesity Knee Pain    Depression    Lower Extremity Swelling    Back Pain    Fatigue    Groin Rash    Hip Pain       Assessment & Plan   Problem List Items Addressed This Visit        Digestive    Class 3 severe obesity due to excess calories with serious comorbidity and body mass index (BMI) of 40.0 to 44.9 in adult (H) - Primary     Tolerating wegovy 2.4mg well. Adverse effects just with specific food triggers. Working to avoid hunger which occurs mostly when going too long without eating- not eating because not hungry and nothing sounds good. Continues to work on protein. Hydration adequate. Discussed strategies.     Plan:  Continue wegovy 2.4mg   Continue to look for ways to add protein- lentils? High protein grains? Protein supplement?  Avoid excessive hunger with small snack instead of meal if not hungry- fuel rather than pleasure   Great work with hydration   Great work prepping meals a head of time!   Great work finding ways to be more active as a family!   Follow up 3 months   Labs 2023 with pcp            Relevant Medications    Semaglutide-Weight Management (WEGOVY) 2.4 MG/0.75ML pen    Other Relevant Orders    CBC with platelets    Comprehensive metabolic panel    Hemoglobin A1c    Lipid panel reflex to direct LDL Fasting          INTERVAL HISTORY:  New MWM 2021 BMI 44 with prediabetes, gerd, joint pain, hypothyroidism, fatigue, depression. Gap in follow  "up with the loss of son, restarted wegovy 12/2022 (280lb, BMI 45).    Last seen 6/6/2023 - taper up to 2.4mg, tolerating 1mg well.     Anti-obesity medication history    Current:   wegovy- taper up to 2.4mg over the summer   -nausea and diarrhea with greasy foods   -less satisfaction with cravings   -cravings more controllable   -food seems \"less important\"     Recent diet changes:   Less cravings for greasy foods   Packing back up snacks when out of the house   Hard time eating anything if not hungry at a meal   Lots of fruit and veggies   Struggling some with meat   Meal prepping for work     Recent exercise/activity changes:   Talking about how to get more active  Walking more as activities     Recent stressors:    Travelling back and forth from [place] for work in [place] (4 hours)     Vitamins/Labs: will check with pcp in feb     CURRENT WEIGHT:   254 lbs 0 oz    Initial Weight (lbs): 275 lbs  Last Visits Weight: 124.3 kg (274 lb)  Cumulative weight loss (lbs): 21  Weight Loss Percentage: 7.64%        11/2/2023     6:46 AM   Changes and Difficulties   I have made the following changes to my diet since my last visit: None   With regards to my diet, I am still struggling with: None   I have made the following changes to my activity/exercise since my last visit: None   With regards to my activity/exercise, I am still struggling with: None         MEDICATIONS:   Current Outpatient Medications   Medication Sig Dispense Refill     Cholecalciferol (VITAMIN D3) 50 MCG (2000 UT) CAPS        clindamycin (CLINDAMAX) 1 % external gel Apply topically 2 times daily 60 g 11     clindamycin (CLINDAMAX) 1 % external gel Apply to AA QD 60 g 1     DULoxetine (CYMBALTA) 60 MG capsule TAKE ONE CAPSULE BY MOUTH ONCE DAILY 90 capsule 3     estradiol (VIVELLE-DOT) 0.05 MG/24HR bi-weekly patch Place 1 patch onto the skin twice a week 24 patch 2     ferrous sulfate (FEROSUL) 325 (65 Fe) MG tablet Take 1 tablet (325 mg) by mouth daily (with " breakfast) 90 tablet 3     levothyroxine (SYNTHROID/LEVOTHROID) 137 MCG tablet Take 1 tablet (137 mcg) by mouth daily 90 tablet 3     progesterone (PROMETRIUM) 100 MG capsule Take 100 mg for 12 days, starting on day 14 of your cycle (period starts day 1). 90 capsule 0     Semaglutide-Weight Management (WEGOVY) 1.7 MG/0.75ML pen Inject 1.7 mg Subcutaneous every 7 days 3 mL 0     Semaglutide-Weight Management (WEGOVY) 2.4 MG/0.75ML pen Inject 2.4 mg Subcutaneous every 7 days 3 mL 3     Semaglutide-Weight Management (WEGOVY) 2.4 MG/0.75ML pen Inject 2.4 mg Subcutaneous every 7 days 3 mL 3     traZODone (DESYREL) 50 MG tablet Take 1-2 tablets ( mg) by mouth nightly as needed for sleep 90 tablet 3     triamcinolone (KENALOG) 0.1 % external cream Apply to AA on the hands and arms BID x 1-2 week then PRN only (Patient not taking: Reported on 11/2/2023) 80 g 2           11/2/2023     6:46 AM   Weight Loss Medication History Reviewed With Patient   Which weight loss medications are you currently taking on a regular basis? Wegovy   Are you having any side effects from the weight loss medication that we have prescribed you? No       Office Visit on 12/09/2022   Component Date Value Ref Range Status     Interpretation 12/09/2022 Negative for Intraepithelial Lesion or Malignancy (NILM)    Final     Comment 12/09/2022    Final                    Value:This result contains rich text formatting which cannot be displayed here.     Specimen Adequacy 12/09/2022 Satisfactory for evaluation, endocervical/transformation zone component present   Final     Clinical Information 12/09/2022    Final                    Value:This result contains rich text formatting which cannot be displayed here.     Reflex Testing 12/09/2022 Yes regardless of result   Final     Previous Abnormal? 12/09/2022    Final                    Value:This result contains rich text formatting which cannot be displayed here.     Performing Labs 12/09/2022    Final  "                   Value:This result contains rich text formatting which cannot be displayed here.     Hemoglobin A1C 12/09/2022 5.4  0.0 - 5.6 % Final    Normal <5.7%   Prediabetes 5.7-6.4%    Diabetes 6.5% or higher     Note: Adopted from ADA consensus guidelines.     Sodium 12/09/2022 139  136 - 145 mmol/L Final     Potassium 12/09/2022 4.4  3.4 - 5.3 mmol/L Final     Chloride 12/09/2022 103  98 - 107 mmol/L Final     Carbon Dioxide (CO2) 12/09/2022 22  22 - 29 mmol/L Final     Anion Gap 12/09/2022 14  7 - 15 mmol/L Final     Urea Nitrogen 12/09/2022 17.3  6.0 - 20.0 mg/dL Final     Creatinine 12/09/2022 0.74  0.51 - 0.95 mg/dL Final     Calcium 12/09/2022 9.0  8.6 - 10.0 mg/dL Final     Glucose 12/09/2022 94  70 - 99 mg/dL Final     Alkaline Phosphatase 12/09/2022 84  35 - 104 U/L Final     AST 12/09/2022 22  10 - 35 U/L Final     ALT 12/09/2022 13  10 - 35 U/L Final     Protein Total 12/09/2022 7.3  6.4 - 8.3 g/dL Final     Albumin 12/09/2022 4.2  3.5 - 5.2 g/dL Final     Bilirubin Total 12/09/2022 0.4  <=1.2 mg/dL Final     GFR Estimate 12/09/2022 >90  >60 mL/min/1.73m2 Final    Effective December 21, 2021 eGFRcr in adults is calculated using the 2021 CKD-EPI creatinine equation which includes age and gender (Chaitanya et al., NEJM, DOI: 10.1056/EXNQzx2917173)     TSH 12/09/2022 2.78  0.30 - 4.20 uIU/mL Final     Other HR HPV 12/09/2022 Negative  Negative Final     HPV16 DNA 12/09/2022 Negative  Negative Final     HPV18 DNA 12/09/2022 Negative  Negative Final     FINAL DIAGNOSIS 12/09/2022    Final                    Value:This result contains rich text formatting which cannot be displayed here.           12/16/2019     2:06 PM 12/9/2022    10:21 AM   TRAVON Score (Last Two)   TRAVON Raw Score 34 36   Activation Score 68.9 75.5   TRAVON Level 3 4         PHYSICAL EXAM:  Objective    Ht 1.676 m (5' 6\")   Wt 115.2 kg (254 lb)   BMI 41.00 kg/m      Vitals - Patient Reported  Pain Score: No Pain (0)        GENERAL: Healthy, " alert and no distress  EYES: Eyes grossly normal to inspection.  No discharge or erythema, or obvious scleral/conjunctival abnormalities.  RESP: No audible wheeze, cough, or visible cyanosis.  No visible retractions or increased work of breathing.    SKIN: Visible skin clear. No significant rash, abnormal pigmentation or lesions.  NEURO: Cranial nerves grossly intact.  Mentation and speech appropriate for age.  PSYCH: Mentation appears normal, affect normal/bright, judgement and insight intact, normal speech and appearance well-groomed.        Sincerely,    Lisa Andre NP      30 minutes spent by me on the date of the encounter doing chart review, history and exam, documentation and further activities per the note    Virtual Visit Details    Type of service:  Video Visit     Originating Location (pt. Location): Home    Distant Location (provider location):  Off-site  Platform used for Video Visit: Sharon

## 2023-11-02 NOTE — NURSING NOTE
Is the patient currently in the state of [place]? YES    Visit mode:VIDEO    If the visit is dropped, the patient can be reconnected by: VIDEO VISIT: Text to cell phone:   Telephone Information:   Mobile [phone]       Will anyone else be joining the visit? NO  (If patient encounters technical issues they should call [phone]    How would you like to obtain your AVS? MyChart    Are changes needed to the allergy or medication list? No, Pt stated no changes to allergies, and Pt stated no med changes    Reason for visit: RECHECK (WALTER)    Nimco BROOKE

## 2023-11-10 DIAGNOSIS — Z79.890 HORMONE REPLACEMENT THERAPY: ICD-10-CM

## 2023-11-10 DIAGNOSIS — N95.1 MENOPAUSAL SYNDROME (HOT FLASHES): ICD-10-CM

## 2023-11-10 RX ORDER — PROGESTERONE 100 MG/1
CAPSULE ORAL
Qty: 90 CAPSULE | Refills: 0 | Status: SHIPPED | OUTPATIENT
Start: 2023-11-10 | End: 2024-02-22

## 2023-12-28 DIAGNOSIS — N95.1 MENOPAUSAL SYNDROME (HOT FLASHES): ICD-10-CM

## 2023-12-28 DIAGNOSIS — Z79.890 HORMONE REPLACEMENT THERAPY: ICD-10-CM

## 2023-12-28 RX ORDER — ESTRADIOL 0.05 MG/D
1 PATCH, EXTENDED RELEASE TRANSDERMAL
Qty: 24 PATCH | Refills: 0 | Status: SHIPPED | OUTPATIENT
Start: 2023-12-28 | End: 2024-02-22

## 2023-12-29 DIAGNOSIS — F33.42 RECURRENT MAJOR DEPRESSIVE DISORDER, IN FULL REMISSION (H): ICD-10-CM

## 2024-01-02 RX ORDER — DULOXETIN HYDROCHLORIDE 60 MG/1
CAPSULE, DELAYED RELEASE ORAL
Qty: 90 CAPSULE | Refills: 0 | Status: SHIPPED | OUTPATIENT
Start: 2024-01-02 | End: 2024-02-22

## 2024-01-09 DIAGNOSIS — F51.04 PSYCHOPHYSIOLOGICAL INSOMNIA: ICD-10-CM

## 2024-01-09 DIAGNOSIS — E89.0 HYPOTHYROIDISM, POSTSURGICAL: ICD-10-CM

## 2024-01-10 RX ORDER — TRAZODONE HYDROCHLORIDE 50 MG/1
50-100 TABLET, FILM COATED ORAL
Qty: 90 TABLET | Refills: 1 | Status: SHIPPED | OUTPATIENT
Start: 2024-01-10 | End: 2024-02-22

## 2024-01-10 RX ORDER — LEVOTHYROXINE SODIUM 137 UG/1
137 TABLET ORAL DAILY
Qty: 90 TABLET | Refills: 1 | Status: SHIPPED | OUTPATIENT
Start: 2024-01-10 | End: 2024-02-22

## 2024-01-15 ENCOUNTER — TELEPHONE (OUTPATIENT)
Dept: SURGERY | Facility: CLINIC | Age: 49
End: 2024-01-15
Payer: COMMERCIAL

## 2024-01-15 ENCOUNTER — VIRTUAL VISIT (OUTPATIENT)
Dept: CARDIOLOGY | Facility: CLINIC | Age: 49
End: 2024-01-15
Attending: PHYSICIAN ASSISTANT
Payer: COMMERCIAL

## 2024-01-15 VITALS — BODY MASS INDEX: 40.5 KG/M2 | WEIGHT: 252 LBS | HEIGHT: 66 IN

## 2024-01-15 DIAGNOSIS — E66.813 CLASS 3 SEVERE OBESITY DUE TO EXCESS CALORIES WITH SERIOUS COMORBIDITY AND BODY MASS INDEX (BMI) OF 40.0 TO 44.9 IN ADULT (H): Primary | ICD-10-CM

## 2024-01-15 DIAGNOSIS — E66.01 CLASS 3 SEVERE OBESITY DUE TO EXCESS CALORIES WITH SERIOUS COMORBIDITY AND BODY MASS INDEX (BMI) OF 40.0 TO 44.9 IN ADULT (H): Primary | ICD-10-CM

## 2024-01-15 RX ORDER — SEMAGLUTIDE 2.4 MG/.75ML
2.4 INJECTION, SOLUTION SUBCUTANEOUS
Qty: 3 ML | Refills: 5 | Status: SHIPPED | OUTPATIENT
Start: 2024-01-15

## 2024-01-15 ASSESSMENT — PAIN SCALES - GENERAL: PAINLEVEL: NO PAIN (0)

## 2024-01-15 NOTE — PATIENT INSTRUCTIONS
"Recommendations from today's MTM visit:                                                    Remain on Wegovy 2.4 mg once weekly.     Schedule follow-up visit with Pharm.D. at your convenience.  Please contact me with any questions or concerns.      It was great speaking with you today.  I value your experience and would be very thankful for your time in providing feedback in our clinic survey. In the next few days, you may receive an email or text message from [facility] [employer] with a link to a survey related to your  clinical pharmacist.\"     To schedule another MTM appointment, please call the clinic directly or you may call the MTM scheduling line at [phone].    My Clinical Pharmacist's contact information:                                                      Please feel free to contact me with any questions or concerns you have.      Diomedes John PharmD, BCACP  Medication Therapy Management Pharmacist  [facility]    "

## 2024-01-15 NOTE — Clinical Note
DEVAN - patient established with Lisa and doing well with Wegovy 2.4 mg weekly.  She plans to follow-up with Lisa and we will schedule Pharm.D. appointment after that.  Orders for Wegovy renewed.  Thank you, Narciso

## 2024-01-15 NOTE — Clinical Note
1/15/2024      RE: Kaleigh Nam  [address] [address]       Dear Colleague,    Thank you for the opportunity to participate in the care of your patient, Kaleigh Nam, at the [facility]. Please see a copy of my visit note below.    Virtual Visit Details    Type of service:  Telephone Visit   Phone call duration: *** minutes     Medication Therapy Management (MTM) Encounter    ASSESSMENT:                            Medication Adherence/Access: No issues identified    Weight management: Continued positive response to use of Wegovy at 2.4 mg with mild, transient nausea on an occasional basis.  Given weight loss progress to this point and continued tolerance of Wegovy, patient remains an appropriate candidate for GLP-1 agonist therapy.  Pretreatment BMI greater than 40 kg/m .  Briefly discussed potential transition to Zepbound if weight plateau continues. Negative history of pancreatitis, medullary thyroid cancer and multiple endocrine neoplasia type 2.      For patients that are under [employer] insurance coverage, it is mandated by insurance that each qualifying patient meet with hospital based Weight Management Medication Therapy Management pharmacist to continue therapy coverage. The following patient meets the below coverage criteria and can therefore continue GLP-1/GIP agonist therapy for Weight Management:    Adult  BMI >40 with or without comorbidities   OR   BMI >30 + NAFLD*   at time of initiating GLP-1/GIP agonist therapy Approved for 29 weeks  Met Updated Initial Criteria   At least 5% weight loss of baseline body weight  Approved for 12 months        PLAN:                            Remain on Wegovy 2.4 mg once weekly.    Schedule follow-up visit with Pharm.D. at your convenience.  Please contact me with any questions or concerns.    SUBJECTIVE/OBJECTIVE:                      "     Carin Nam is a 48 year old female called for an initial visit. She was referred to me from [facility] Insurance requirement .      Reason for visit: GLP-1 agonist consult .    Allergies/ADRs: Reviewed in chart  Past Medical History: Reviewed in chart  Tobacco: She reports that she has never smoked. She has never used smokeless tobacco.    Medication Adherence/Access: no issues reported    Weight management:  Wegovy 2.4 mg weekly     Has been on Wegovy for about a year with an interruption before that. Has been on 2.4 mg weekly since June/July of 2023. Feels things are going pretty well in general. [place] early on to avoid high fat foods, will have troubles with nausea, diarrhea, gas, bloating. On a regular basis does not have troubles with GI adverse effects aside from mild nausea. Denies vomiting. Does have very mild constipation. Responsive to adequate water and fiber intake. Feels weight changes have been slower the last few months.     Fluid/Water intake: 100 oz of water daily   Diet: Deliberate about fiber intake. Does also take iron, which can worsen constipation. Does have a harder time with protein, but is also intentional about including this.   Physical activity: Walking, lifting of heavy packages with work/lifestyle. Some lifestyle.     Medical History:  MEN2/Medullary Thyroid Cancer: Negative   Pancreatitis: Negative     Current weight: 252 lbs   Initial Consult Weight: 283 lbs   Cumulative Weight loss: 30 lbs       Wt Readings from Last 4 Encounters:   01/15/24 114.3 kg (252 lb)   11/02/23 115.2 kg (254 lb)   06/06/23 124.3 kg (274 lb)   03/02/23 124.7 kg (275 lb)     Body Mass Index (BMI) Body mass index is 40.67 kg/m .    Today's Vitals: Ht 1.676 m (5' 6\")   Wt 114.3 kg (252 lb)   BMI 40.67 kg/m      Lab Results   Component Value Date    A1C 5.4 12/09/2022    A1C 5.3 06/28/2021    A1C 5.0 06/22/2017    A1C 5.0 02/18/2016    A1C 5.4 07/24/2014     Lab Results   Component Value Date    CHOL 188 " 10/02/2020     Lab Results   Component Value Date    HDL 73 10/02/2020     Lab Results   Component Value Date     10/02/2020     Lab Results   Component Value Date    TRIG 60 10/02/2020     Lab Results   Component Value Date    MI 2.3 12/05/2007     ----------------      I spent 15 minutes with this patient today. All changes were made via collaborative practice agreement with Ashley Malave PA-C . A copy of the visit note was provided to the patient's provider(s).    A summary of these recommendations was sent via [employer].    Diomedes John, PharmD, BCACP  Medication Therapy Management Pharmacist  [facility]     Telemedicine Visit Details  Type of service:  Telephone visit  Start Time:  935am  End Time:  950am     Medication Therapy Recommendations  No medication therapy recommendations to display           Please do not hesitate to contact me if you have any questions/concerns.     Sincerely,     DIOMEDES JOHN RPH

## 2024-01-15 NOTE — PROGRESS NOTES
Medication Therapy Management (MTM) Encounter    ASSESSMENT:                            Medication Adherence/Access: No issues identified    Weight management: Continued positive response to use of Wegovy at 2.4 mg with mild, transient nausea on an occasional basis.  Given weight loss progress to this point and continued tolerance of Wegovy, patient remains an appropriate candidate for GLP-1 agonist therapy.  Pretreatment BMI greater than 40 kg/m .  Briefly discussed potential transition to Zepbound if weight plateau continues. Negative history of pancreatitis, medullary thyroid cancer and multiple endocrine neoplasia type 2.      For patients that are under [employer] insurance coverage, it is mandated by insurance that each qualifying patient meet with hospital based Weight Management Medication Therapy Management pharmacist to continue therapy coverage. The following patient meets the below coverage criteria and can therefore continue GLP-1/GIP agonist therapy for Weight Management:    Adult  BMI >40 with or without comorbidities   OR   BMI >30 + NAFLD*   at time of initiating GLP-1/GIP agonist therapy Approved for 29 weeks  Met Updated Initial Criteria   At least 5% weight loss of baseline body weight  Approved for 12 months        PLAN:                            Remain on Wegovy 2.4 mg once weekly.    Schedule follow-up visit with Pharm.D. at your convenience.  Please contact me with any questions or concerns.    SUBJECTIVE/OBJECTIVE:                          Carin Nam is a 48 year old female called for an initial visit. She was referred to me from [facility] Insurance requirement .      Reason for visit: GLP-1 agonist consult .    Allergies/ADRs: Reviewed in chart  Past Medical History: Reviewed in chart  Tobacco: She reports that she has never smoked. She has never used smokeless tobacco.    Medication Adherence/Access: no issues reported    Weight management:  Wegovy 2.4 mg weekly     Has been  "on Wegovy for about a year with an interruption before that. Has been on 2.4 mg weekly since June/July of 2023. Feels things are going pretty well in general. [place] early on to avoid high fat foods, will have troubles with nausea, diarrhea, gas, bloating. On a regular basis does not have troubles with GI adverse effects aside from mild nausea. Denies vomiting. Does have very mild constipation. Responsive to adequate water and fiber intake. Feels weight changes have been slower the last few months.     Fluid/Water intake: 100 oz of water daily   Diet: Deliberate about fiber intake. Does also take iron, which can worsen constipation. Does have a harder time with protein, but is also intentional about including this.   Physical activity: Walking, lifting of heavy packages with work/lifestyle. Some lifestyle.     Medical History:  MEN2/Medullary Thyroid Cancer: Negative   Pancreatitis: Negative     Current weight: 252 lbs   Initial Consult Weight: 283 lbs   Cumulative Weight loss: 30 lbs       Wt Readings from Last 4 Encounters:   01/15/24 114.3 kg (252 lb)   11/02/23 115.2 kg (254 lb)   06/06/23 124.3 kg (274 lb)   03/02/23 124.7 kg (275 lb)     Body Mass Index (BMI) Body mass index is 40.67 kg/m .    Today's Vitals: Ht 1.676 m (5' 6\")   Wt 114.3 kg (252 lb)   BMI 40.67 kg/m      Lab Results   Component Value Date    A1C 5.4 12/09/2022    A1C 5.3 06/28/2021    A1C 5.0 06/22/2017    A1C 5.0 02/18/2016    A1C 5.4 07/24/2014     Lab Results   Component Value Date    CHOL 188 10/02/2020     Lab Results   Component Value Date    HDL 73 10/02/2020     Lab Results   Component Value Date     10/02/2020     Lab Results   Component Value Date    TRIG 60 10/02/2020     Lab Results   Component Value Date    CHOLHDLRATIO 2.3 12/05/2007     ----------------      I spent 15 minutes with this patient today. All changes were made via collaborative practice agreement with Ashley Malave PA-C . A copy of the visit note was " provided to the patient's provider(s).    A summary of these recommendations was sent via [employer].    Diomedes John, PharmD, BCACP  Medication Therapy Management Pharmacist  [facility]     Telemedicine Visit Details  Type of service:  Telephone visit  Start Time:  935am  End Time:  950am     Medication Therapy Recommendations  No medication therapy recommendations to display

## 2024-01-15 NOTE — NURSING NOTE
Is the patient currently in the state of [place]? YES    Visit mode:TELEPHONE    If the visit is dropped, the patient can be reconnected by: TELEPHONE VISIT: Phone number: [phone]    Will anyone else be joining the visit? NO  (If patient encounters technical issues they should call [phone])    How would you like to obtain your AVS? MyChart    Are changes needed to the allergy or medication list? No  Please remove any meds marked not taking and any flagged for removal.    Reason for visit: Consult    Wt/ht other than 24 hrs:    Pain more than one location: no   Lesa BROOKE

## 2024-02-21 SDOH — HEALTH STABILITY: PHYSICAL HEALTH: ON AVERAGE, HOW MANY MINUTES DO YOU ENGAGE IN EXERCISE AT THIS LEVEL?: 20 MIN

## 2024-02-21 SDOH — HEALTH STABILITY: PHYSICAL HEALTH: ON AVERAGE, HOW MANY DAYS PER WEEK DO YOU ENGAGE IN MODERATE TO STRENUOUS EXERCISE (LIKE A BRISK WALK)?: 1 DAY

## 2024-02-21 ASSESSMENT — PATIENT HEALTH QUESTIONNAIRE - PHQ9
SUM OF ALL RESPONSES TO PHQ QUESTIONS 1-9: 3
SUM OF ALL RESPONSES TO PHQ QUESTIONS 1-9: 3
10. IF YOU CHECKED OFF ANY PROBLEMS, HOW DIFFICULT HAVE THESE PROBLEMS MADE IT FOR YOU TO DO YOUR WORK, TAKE CARE OF THINGS AT HOME, OR GET ALONG WITH OTHER PEOPLE: NOT DIFFICULT AT ALL

## 2024-02-21 ASSESSMENT — SOCIAL DETERMINANTS OF HEALTH (SDOH): HOW OFTEN DO YOU GET TOGETHER WITH FRIENDS OR RELATIVES?: ONCE A WEEK

## 2024-02-21 NOTE — PROGRESS NOTES
# Hypothyroidism  TSH   Date Value Ref Range Status   12/09/2022 2.78 0.30 - 4.20 uIU/mL Final   12/03/2021 0.77 0.40 - 4.00 mU/L Final   10/02/2020 2.38 0.40 - 4.00 mU/L Final   - synthroid    # Restless legs  - iron 325 mg daily   Latest Reference Range & Units 12/03/21 11:41   Ferritin 8 - 252 ng/mL 17       # Menopausal hot flashes  # HRT    # Healthy weight - Endo  Wt Readings from Last 4 Encounters:   01/15/24 114.3 kg (252 lb)   11/02/23 115.2 kg (254 lb)   06/06/23 124.3 kg (274 lb)   03/02/23 124.7 kg (275 lb)   - wegovy 2.4 mg weekly (max dose)    # Vitamin d def  Vitamin D Deficiency Screening Results:  Lab Results   Component Value Date    VITDT 29 10/02/2020    VITDT 47 09/06/2017    VITDT 20 06/22/2017    VITDT 25 (L) 06/26/2013     # Mental health  # PTSD      11/17/2021     3:16 PM 6/29/2022     9:31 AM 12/9/2022    10:17 AM   PHQ   PHQ-9 Total Score 4 10 3   Q9: Thoughts of better off dead/self-harm past 2 weeks Not at all Not at all Not at all         11/17/2021     3:17 PM 4/11/2022    11:28 AM 6/29/2022     9:31 AM   SAMUEL-7 SCORE   Total Score 2 (minimal anxiety) 14 (moderate anxiety) 7 (mild anxiety)   Total Score 2 14 7     - cymbalta 60    # Hidradenitis suppurativa  - clinda getl    # HCM  The ASCVD Risk score (Joy DK, et al., 2019) failed to calculate for the following reasons:    The systolic blood pressure is missing    - due flu/covid  - mammo scheduled  - pap, colon utd  - due shingels

## 2024-02-22 ENCOUNTER — ANCILLARY PROCEDURE (OUTPATIENT)
Dept: MAMMOGRAPHY | Facility: CLINIC | Age: 49
End: 2024-02-22
Attending: INTERNAL MEDICINE
Payer: COMMERCIAL

## 2024-02-22 ENCOUNTER — OFFICE VISIT (OUTPATIENT)
Dept: PEDIATRICS | Facility: CLINIC | Age: 49
End: 2024-02-22
Payer: COMMERCIAL

## 2024-02-22 VITALS
HEIGHT: 65 IN | RESPIRATION RATE: 16 BRPM | TEMPERATURE: 97.5 F | HEART RATE: 72 BPM | SYSTOLIC BLOOD PRESSURE: 104 MMHG | DIASTOLIC BLOOD PRESSURE: 62 MMHG | WEIGHT: 257 LBS | BODY MASS INDEX: 42.82 KG/M2 | OXYGEN SATURATION: 98 %

## 2024-02-22 DIAGNOSIS — L24.9 IRRITANT DERMATITIS: ICD-10-CM

## 2024-02-22 DIAGNOSIS — G25.81 RESTLESS LEGS SYNDROME: ICD-10-CM

## 2024-02-22 DIAGNOSIS — Z13.1 SCREENING FOR DIABETES MELLITUS: ICD-10-CM

## 2024-02-22 DIAGNOSIS — R11.0 NAUSEA: ICD-10-CM

## 2024-02-22 DIAGNOSIS — E89.0 HYPOTHYROIDISM, POSTSURGICAL: ICD-10-CM

## 2024-02-22 DIAGNOSIS — Z29.89 ALTITUDE SICKNESS PROPHYLAXIS: ICD-10-CM

## 2024-02-22 DIAGNOSIS — F51.04 PSYCHOPHYSIOLOGICAL INSOMNIA: ICD-10-CM

## 2024-02-22 DIAGNOSIS — Z00.00 ROUTINE GENERAL MEDICAL EXAMINATION AT A HEALTH CARE FACILITY: Primary | ICD-10-CM

## 2024-02-22 DIAGNOSIS — N95.1 MENOPAUSAL SYNDROME (HOT FLASHES): ICD-10-CM

## 2024-02-22 DIAGNOSIS — Z13.220 SCREENING CHOLESTEROL LEVEL: ICD-10-CM

## 2024-02-22 DIAGNOSIS — F33.42 RECURRENT MAJOR DEPRESSIVE DISORDER, IN FULL REMISSION (H): ICD-10-CM

## 2024-02-22 DIAGNOSIS — R07.9 RIGHT-SIDED CHEST PAIN: ICD-10-CM

## 2024-02-22 DIAGNOSIS — L73.2 HIDRADENITIS SUPPURATIVA: ICD-10-CM

## 2024-02-22 DIAGNOSIS — Z79.890 HORMONE REPLACEMENT THERAPY (HRT): ICD-10-CM

## 2024-02-22 DIAGNOSIS — E55.9 VITAMIN D DEFICIENCY: ICD-10-CM

## 2024-02-22 DIAGNOSIS — Z12.31 VISIT FOR SCREENING MAMMOGRAM: ICD-10-CM

## 2024-02-22 PROCEDURE — 99214 OFFICE O/P EST MOD 30 MIN: CPT | Mod: 25 | Performed by: INTERNAL MEDICINE

## 2024-02-22 PROCEDURE — 77067 SCR MAMMO BI INCL CAD: CPT | Mod: TC | Performed by: RADIOLOGY

## 2024-02-22 PROCEDURE — 77063 BREAST TOMOSYNTHESIS BI: CPT | Mod: TC | Performed by: RADIOLOGY

## 2024-02-22 PROCEDURE — 99396 PREV VISIT EST AGE 40-64: CPT | Performed by: INTERNAL MEDICINE

## 2024-02-22 RX ORDER — LEVOTHYROXINE SODIUM 137 UG/1
137 TABLET ORAL DAILY
Qty: 90 TABLET | Refills: 3 | Status: SHIPPED | OUTPATIENT
Start: 2024-02-22

## 2024-02-22 RX ORDER — PROGESTERONE 100 MG/1
CAPSULE ORAL
Qty: 90 CAPSULE | Refills: 3 | Status: SHIPPED | OUTPATIENT
Start: 2024-02-22

## 2024-02-22 RX ORDER — CLINDAMYCIN PHOSPHATE 10 MG/G
GEL TOPICAL 2 TIMES DAILY
Qty: 60 G | Refills: 11 | Status: SHIPPED | OUTPATIENT
Start: 2024-02-22

## 2024-02-22 RX ORDER — ESTRADIOL 0.05 MG/D
1 PATCH, EXTENDED RELEASE TRANSDERMAL
Qty: 24 PATCH | Refills: 4 | Status: SHIPPED | OUTPATIENT
Start: 2024-02-22

## 2024-02-22 RX ORDER — DULOXETIN HYDROCHLORIDE 60 MG/1
CAPSULE, DELAYED RELEASE ORAL
Qty: 90 CAPSULE | Refills: 3 | Status: SHIPPED | OUTPATIENT
Start: 2024-02-22

## 2024-02-22 RX ORDER — TRIAMCINOLONE ACETONIDE 1 MG/G
CREAM TOPICAL
Qty: 80 G | Refills: 2 | Status: SHIPPED | OUTPATIENT
Start: 2024-02-22

## 2024-02-22 RX ORDER — SUMATRIPTAN 50 MG/1
50 TABLET, FILM COATED ORAL
Qty: 20 TABLET | Refills: 1 | Status: SHIPPED | OUTPATIENT
Start: 2024-02-22

## 2024-02-22 RX ORDER — TRAZODONE HYDROCHLORIDE 50 MG/1
25-50 TABLET, FILM COATED ORAL
Qty: 90 TABLET | Refills: 3 | Status: SHIPPED | OUTPATIENT
Start: 2024-02-22

## 2024-02-22 RX ORDER — ONDANSETRON 4 MG/1
4 TABLET, ORALLY DISINTEGRATING ORAL EVERY 8 HOURS PRN
Qty: 20 TABLET | Refills: 3 | Status: SHIPPED | OUTPATIENT
Start: 2024-02-22

## 2024-02-22 ASSESSMENT — PAIN SCALES - GENERAL: PAINLEVEL: NO PAIN (0)

## 2024-02-22 NOTE — PATIENT INSTRUCTIONS
Can try the imitrex/zofran for [place]. If it happens again next time we could try diamox.   If chest pain worsening/with exertion let me know and we should do an exercise stress test.   Preventive Care Advice   This is general advice given by our system to help you stay healthy. However, your care team may have specific advice just for you. Please talk to your care team about your preventive care needs.  Nutrition  Eat 5 or more servings of fruits and vegetables each day.  Try wheat bread, brown rice and whole grain pasta (instead of white bread, rice, and pasta).  Get enough calcium and vitamin D. Check the label on foods and aim for 100% of the RDA (recommended daily allowance).  Lifestyle  Exercise at least 150 minutes each week  (30 minutes a day, 5 days a week).  Do muscle strengthening activities 2 days a week. These help control your weight and prevent disease.  No smoking.  Wear sunscreen to prevent skin cancer.  Have a dental exam and cleaning every 6 months.  Yearly exams  See your health care team every year to talk about:  Any changes in your health.  Any medicines your care team has prescribed.  Preventive care, family planning, and ways to prevent chronic diseases.  Shots (vaccines)   HPV shots (up to age 26), if you've never had them before.  Hepatitis B shots (up to age 59), if you've never had them before.  COVID-19 shot: Get this shot when it's due.  Flu shot: Get a flu shot every year.  Tetanus shot: Get a tetanus shot every 10 years.  Pneumococcal, hepatitis A, and RSV shots: Ask your care team if you need these based on your risk.  Shingles shot (for age 50 and up)  General health tests  Diabetes screening:  Starting at age 35, Get screened for diabetes at least every 3 years.  If you are younger than age 35, ask your care team if you should be screened for diabetes.  Cholesterol test: At age 39, start having a cholesterol test every 5 years, or more often if advised.  Bone density scan  (DEXA): At age 50, ask your care team if you should have this scan for osteoporosis (brittle bones).  Hepatitis C: Get tested at least once in your life.  STIs (sexually transmitted infections)  Before age 24: Ask your care team if you should be screened for STIs.  After age 24: Get screened for STIs if you're at risk. You are at risk for STIs (including HIV) if:  You are sexually active with more than one person.  You don't use condoms every time.  You or a partner was diagnosed with a sexually transmitted infection.  If you are at risk for HIV, ask about PrEP medicine to prevent HIV.  Get tested for HIV at least once in your life, whether you are at risk for HIV or not.  Cancer screening tests  Cervical cancer screening: If you have a cervix, begin getting regular cervical cancer screening tests starting at age 21.  Breast cancer scan (mammogram): If you've ever had breasts, begin having regular mammograms starting at age 40. This is a scan to check for breast cancer.  Colon cancer screening: It is important to start screening for colon cancer at age 45.  Have a colonoscopy test every 10 years (or more often if you're at risk) Or, ask your provider about stool tests like a FIT test every year or Cologuard test every 3 years.  To learn more about your testing options, visit:   [email or web address].  For help making a decision, visit:   https://bit.ly/tt81708.  Prostate cancer screening test: If you have a prostate, ask your care team if a prostate cancer screening test (PSA) at age 55 is right for you.  Lung cancer screening: If you are a current or former smoker ages 50 to 80, ask your care team if ongoing lung cancer screenings are right for you.  For informational purposes only. Not to replace the advice of your health care provider. Copyright   2023 [employer]. All rights reserved. Clinically reviewed by the [facility] Transitions Program. [employer] [id number] - REV 01/24.

## 2024-02-22 NOTE — PROGRESS NOTES
"Preventive Care Visit  [facility] HAWA Bang MD, Internal Medicine - Pediatrics  Feb 22, 2024    Assessment & Plan       ICD-10-CM    1. Routine general medical examination at a health care facility  Z00.00       2. Hypothyroidism, postsurgical  E89.0 TSH with free T4 reflex     levothyroxine (SYNTHROID/LEVOTHROID) 137 MCG tablet      3. Right-sided chest pain  R07.9 CT Coronary Calcium Scan      4. Hidradenitis suppurativa  L73.2 clindamycin (CLEOCIN-T) 1 % external gel      5. Restless legs syndrome  G25.81 Ferritin     Comprehensive metabolic panel      6. Vitamin D deficiency  E55.9 Vitamin D Deficiency      7. Recurrent major depressive disorder, in full remission (H24)  F33.42 DULoxetine (CYMBALTA) 60 MG capsule      8. Psychophysiological insomnia  F51.04 traZODone (DESYREL) 50 MG tablet      9. Menopausal syndrome (hot flashes)  N95.1 estradiol (VIVELLE-DOT) 0.05 MG/24HR bi-weekly patch     progesterone (PROMETRIUM) 100 MG capsule      10. Hormone replacement therapy (HRT) - started 12/2022  Z79.890 Comprehensive metabolic panel     estradiol (VIVELLE-DOT) 0.05 MG/24HR bi-weekly patch     progesterone (PROMETRIUM) 100 MG capsule      11. Irritant dermatitis  L24.9 triamcinolone (KENALOG) 0.1 % external cream      12. Altitude sickness prophylaxis  Z29.89 SUMAtriptan (IMITREX) 50 MG tablet      13. Nausea  R11.0 ondansetron (ZOFRAN ODT) 4 MG ODT tab      14. Screening cholesterol level  Z13.220 Lipid panel reflex to direct LDL Fasting      15. Screening for diabetes mellitus  Z13.1 Hemoglobin A1c        Overall doing well. Some atypical cp most often associated w/ stress. No CAD risk factors but with get CAC. If high would recommend exercise stress test.     Has gotten flu vaccine, will get covid at pharmacy.             BMI  Estimated body mass index is 42.77 kg/m  as calculated from the following:    Height as of this encounter: 1.651 m (5' 5\").    Weight as of this " encounter: 116.6 kg (257 lb).       Counseling  Appropriate preventive services were discussed with this patient, including applicable screening as appropriate for fall prevention, nutrition, physical activity, Tobacco-use cessation, weight loss and cognition.  Checklist reviewing preventive services available has been given to the patient.  Reviewed patient's diet, addressing concerns and/or questions.   She is at risk for lack of exercise and has been provided with information to increase physical activity for the benefit of her well-being.     Jewel Del Rosario is a 49 year old, presenting for the following:  Physical        2/22/2024     8:17 AM   Additional Questions   Roomed by Daria GODINEZ   Accompanied by Self         2/22/2024     8:17 AM   Patient Reported Additional Medications   Patient reports taking the following new medications n/a        Health Care Directive  Patient does not have a Health Care Directive or Living Will: Discussed advance care planning with patient; however, patient declined at this time.    HPI    Tired  Period still coming every month  Prolonged  Last 2 times in [place] - migraine, n/v/d -- ? Altitude sickness... only in [place] the last time. Going back for a concert and a little worried about it.   Occasionally would get migraines with periods in perimenopause. But not so bad.     Chest pain on the right side with emotional stress - occasionally will have the same sensation. Not w/ exercise. Feels tight.  - non smoker  - no early cardiac disease in her family    # Hypothyroidism  TSH   Date Value Ref Range Status   12/09/2022 2.78 0.30 - 4.20 uIU/mL Final   12/03/2021 0.77 0.40 - 4.00 mU/L Final   10/02/2020 2.38 0.40 - 4.00 mU/L Final   - synthroid    # Restless legs  - iron 325 mg daily  - much better  - gets leg spasms if she works and doesn't drink enough   Latest Reference Range & Units 12/03/21 11:41   Ferritin 8 - 252 ng/mL 17       # Menopausal hot flashes  # HRT  - pretty  okay  - not keeping her awake    # Healthy weight - Endo  Wt Readings from Last 4 Encounters:   02/22/24 116.6 kg (257 lb)   01/15/24 114.3 kg (252 lb)   11/02/23 115.2 kg (254 lb)   06/06/23 124.3 kg (274 lb)   - wegovy 2.4 mg weekly (max dose)  - hasn't lost weigth recently - thinking about zepbound  - no side effects really     # Vitamin d def  Vitamin D Deficiency Screening Results:  Lab Results   Component Value Date    VITDT 29 10/02/2020    VITDT 47 09/06/2017    VITDT 20 06/22/2017    VITDT 25 (L) 06/26/2013     # Mental health  # PTSD      6/29/2022     9:31 AM 12/9/2022    10:17 AM 2/21/2024    11:49 AM   PHQ   PHQ-9 Total Score 10 3 3   Q9: Thoughts of better off dead/self-harm past 2 weeks Not at all Not at all Not at all         11/17/2021     3:17 PM 4/11/2022    11:28 AM 6/29/2022     9:31 AM   SAMUEL-7 SCORE   Total Score 2 (minimal anxiety) 14 (moderate anxiety) 7 (mild anxiety)   Total Score 2 14 7     - cymbalta 60    # Hidradenitis suppurativa  - clinda getl    # HCM  The 10-year ASCVD risk score (Joy GARCIA, et al., 2019) is: 0.6%    Values used to calculate the score:      Age: 49 years      Sex: Female      Is Non- : No      Diabetic: No      Tobacco smoker: No      Systolic Blood Pressure: 104 mmHg      Is BP treated: No      HDL Cholesterol: 73 mg/dL      Total Cholesterol: 188 mg/dL    - due flu/covid  - mammo scheduled  - pap, colon utd  - due shingels          2/21/2024   General Health   How would you rate your overall physical health? Good   Feel stress (tense, anxious, or unable to sleep) To some extent   (!) STRESS CONCERN      2/21/2024   Nutrition   Three or more servings of calcium each day? Yes   Diet: Regular (no restrictions)   How many servings of fruit and vegetables per day? (!) 2-3   How many sweetened beverages each day? 0-1         2/21/2024   Exercise   Days per week of moderate/strenous exercise 1 day   Average minutes spent exercising at this level  20 min   (!) EXERCISE CONCERN      2/21/2024   Social Factors   Frequency of gathering with friends or relatives Once a week   Worry food won't last until get money to buy more No   Food not last or not have enough money for food? No   Do you have housing?  Yes   Are you worried about losing your housing? No   Lack of transportation? No   Unable to get utilities (heat,electricity)? No         2/21/2024   Fall Risk   Fallen 2 or more times in the past year? No   Trouble with walking or balance? No          2/21/2024   Dental   Dentist two times every year? Yes         2/21/2024   TB Screening   Were you born outside of [place]?  (!) YES         Today's PHQ-9 Score:       2/21/2024    11:49 AM   PHQ-9 SCORE   PHQ-9 Total Score MyChart 3 (Minimal depression)   PHQ-9 Total Score 3         2/21/2024   Substance Use   Alcohol more than 3/day or more than 7/wk No   Do you use any other substances recreationally? No     Social History     Tobacco Use    Smoking status: Never     Passive exposure: Never    Smokeless tobacco: Never   Vaping Use    Vaping Use: Never used   Substance Use Topics    Alcohol use: Yes     Alcohol/week: 3.0 standard drinks of alcohol     Comment: 1 month    Drug use: No             2/21/2024   Breast Cancer Screening   Family history of breast, colon, or ovarian cancer? No / Unknown          No data to display                 Mammogram Screening - Mammogram every 1-2 years updated in Health Maintenance based on mutual decision making        2/21/2024   STI Screening   New sexual partner(s) since last STI/HIV test? No     History of abnormal Pap smear: NO - age 30-65 PAP every 5 years with negative HPV co-testing recommended        Latest Ref Rng & Units 12/9/2022    11:04 AM 6/22/2017     9:23 AM 6/22/2017     8:40 AM   PAP / HPV   PAP  Negative for Intraepithelial Lesion or Malignancy (NILM)      PAP (Historical)   NIL     HPV 16 DNA Negative Negative   Negative    HPV 18 DNA Negative Negative    "Negative    Other HR HPV Negative Negative   Negative      The 10-year ASCVD risk score (Joy GARCIA, et al., 2019) is: 0.6%    Values used to calculate the score:      Age: 49 years      Sex: Female      Is Non- : No      Diabetic: No      Tobacco smoker: No      Systolic Blood Pressure: 104 mmHg      Is BP treated: No      HDL Cholesterol: 73 mg/dL      Total Cholesterol: 188 mg/dL         Reviewed and updated as needed this visit by Provider                         Objective    Exam  /62   Pulse 72   Temp 97.5  F (36.4  C) (Tympanic)   Resp 16   Ht 1.651 m (5' 5\")   Wt 116.6 kg (257 lb)   LMP 02/12/2024   SpO2 98%   BMI 42.77 kg/m     Estimated body mass index is 42.77 kg/m  as calculated from the following:    Height as of this encounter: 1.651 m (5' 5\").    Weight as of this encounter: 116.6 kg (257 lb).    Physical Exam  GENERAL: alert and no distress  EYES: Eyes grossly normal to inspection, PERRL and conjunctivae and sclerae normal  HENT: ear canals and TM's normal, nose and mouth without ulcers or lesions  NECK: no adenopathy, no asymmetry, masses, or scars  RESP: lungs clear to auscultation - no rales, rhonchi or wheezes  CV: regular rate and rhythm, normal S1 S2, no S3 or S4, no murmur, click or rub, no peripheral edema  ABDOMEN: soft, nontender, no hepatosplenomegaly, no masses and bowel sounds normal  MS: no gross musculoskeletal defects noted, no edema  SKIN: no suspicious lesions or rashes  NEURO: Normal strength and tone, mentation intact and speech normal  PSYCH: mentation appears normal, affect normal/bright      Signed Electronically by: Dusty Bang MD    "

## 2024-02-28 ENCOUNTER — LAB (OUTPATIENT)
Dept: LAB | Facility: CLINIC | Age: 49
End: 2024-02-28
Payer: COMMERCIAL

## 2024-02-28 DIAGNOSIS — E89.0 HYPOTHYROIDISM, POSTSURGICAL: ICD-10-CM

## 2024-02-28 DIAGNOSIS — Z79.890 HORMONE REPLACEMENT THERAPY (HRT): ICD-10-CM

## 2024-02-28 DIAGNOSIS — G25.81 RESTLESS LEGS SYNDROME: ICD-10-CM

## 2024-02-28 DIAGNOSIS — E66.01 CLASS 3 SEVERE OBESITY DUE TO EXCESS CALORIES WITH SERIOUS COMORBIDITY AND BODY MASS INDEX (BMI) OF 40.0 TO 44.9 IN ADULT (H): ICD-10-CM

## 2024-02-28 DIAGNOSIS — E66.813 CLASS 3 SEVERE OBESITY DUE TO EXCESS CALORIES WITH SERIOUS COMORBIDITY AND BODY MASS INDEX (BMI) OF 40.0 TO 44.9 IN ADULT (H): ICD-10-CM

## 2024-02-28 DIAGNOSIS — E55.9 VITAMIN D DEFICIENCY: ICD-10-CM

## 2024-02-28 DIAGNOSIS — Z13.1 SCREENING FOR DIABETES MELLITUS: ICD-10-CM

## 2024-02-28 DIAGNOSIS — Z13.220 SCREENING CHOLESTEROL LEVEL: ICD-10-CM

## 2024-02-28 LAB
ALBUMIN SERPL BCG-MCNC: 4 G/DL (ref 3.5–5.2)
ALP SERPL-CCNC: 81 U/L (ref 40–150)
ALT SERPL W P-5'-P-CCNC: 14 U/L (ref 0–50)
ANION GAP SERPL CALCULATED.3IONS-SCNC: 7 MMOL/L (ref 7–15)
AST SERPL W P-5'-P-CCNC: 16 U/L (ref 0–45)
BILIRUB SERPL-MCNC: 0.3 MG/DL
BUN SERPL-MCNC: 17.1 MG/DL (ref 6–20)
CALCIUM SERPL-MCNC: 8.8 MG/DL (ref 8.6–10)
CHLORIDE SERPL-SCNC: 104 MMOL/L (ref 98–107)
CHOLEST SERPL-MCNC: 178 MG/DL
CREAT SERPL-MCNC: 0.82 MG/DL (ref 0.51–0.95)
DEPRECATED HCO3 PLAS-SCNC: 29 MMOL/L (ref 22–29)
EGFRCR SERPLBLD CKD-EPI 2021: 86 ML/MIN/1.73M2
ERYTHROCYTE [DISTWIDTH] IN BLOOD BY AUTOMATED COUNT: 13.1 % (ref 10–15)
FASTING STATUS PATIENT QL REPORTED: YES
FERRITIN SERPL-MCNC: 48 NG/ML (ref 11–328)
GLUCOSE SERPL-MCNC: 90 MG/DL (ref 70–99)
HBA1C MFR BLD: 5.1 % (ref 0–5.6)
HCT VFR BLD AUTO: 37.8 % (ref 35–47)
HDLC SERPL-MCNC: 67 MG/DL
HGB BLD-MCNC: 11.7 G/DL (ref 11.7–15.7)
LDLC SERPL CALC-MCNC: 99 MG/DL
MCH RBC QN AUTO: 28.8 PG (ref 26.5–33)
MCHC RBC AUTO-ENTMCNC: 31 G/DL (ref 31.5–36.5)
MCV RBC AUTO: 93 FL (ref 78–100)
NONHDLC SERPL-MCNC: 111 MG/DL
PLATELET # BLD AUTO: 286 10E3/UL (ref 150–450)
POTASSIUM SERPL-SCNC: 4.1 MMOL/L (ref 3.4–5.3)
PROT SERPL-MCNC: 6.7 G/DL (ref 6.4–8.3)
RBC # BLD AUTO: 4.06 10E6/UL (ref 3.8–5.2)
SODIUM SERPL-SCNC: 140 MMOL/L (ref 135–145)
TRIGL SERPL-MCNC: 59 MG/DL
TSH SERPL DL<=0.005 MIU/L-ACNC: 0.68 UIU/ML (ref 0.3–4.2)
VIT D+METAB SERPL-MCNC: 26 NG/ML (ref 20–50)
WBC # BLD AUTO: 4.2 10E3/UL (ref 4–11)

## 2024-02-28 PROCEDURE — 36415 COLL VENOUS BLD VENIPUNCTURE: CPT

## 2024-02-28 PROCEDURE — 83036 HEMOGLOBIN GLYCOSYLATED A1C: CPT

## 2024-02-28 PROCEDURE — 84443 ASSAY THYROID STIM HORMONE: CPT

## 2024-02-28 PROCEDURE — 80061 LIPID PANEL: CPT

## 2024-02-28 PROCEDURE — 85027 COMPLETE CBC AUTOMATED: CPT

## 2024-02-28 PROCEDURE — 80053 COMPREHEN METABOLIC PANEL: CPT

## 2024-02-28 PROCEDURE — 82306 VITAMIN D 25 HYDROXY: CPT

## 2024-02-28 PROCEDURE — 82728 ASSAY OF FERRITIN: CPT

## 2024-05-10 ENCOUNTER — TELEPHONE (OUTPATIENT)
Dept: ENDOCRINOLOGY | Facility: CLINIC | Age: 49
End: 2024-05-10
Payer: COMMERCIAL

## 2024-05-10 NOTE — TELEPHONE ENCOUNTER
Left Voicemail (1st Attempt) and Sent Mychart (1st Attempt) for the patient to call back and schedule the following:    Appointment type: [facility]  Provider: Lisa Andre  Return date: Reschedule 6/20/24 appt as provider no longer available  Specialty phone number: [phone]  Additional appointment(s) needed: n/a  Additonal Notes: few spots open on 6/21, otherwise ok to use any new gabe slot per Lisa Andre

## 2024-05-14 ENCOUNTER — TELEPHONE (OUTPATIENT)
Dept: ENDOCRINOLOGY | Facility: CLINIC | Age: 49
End: 2024-05-14
Payer: COMMERCIAL

## 2024-05-14 NOTE — TELEPHONE ENCOUNTER
Left Voicemail (2nd Attempt) for the patient to call back and schedule the following:    Appointment type: [facility]   Provider: Lisa Andre NP  Return date: 06/20/2024  Specialty phone number: [phone]  Additional appointment(s) needed: n/a  Additonal Notes: Provider unavailable, pt needs to reschedule  Ok to use new slot per Lisa

## 2024-05-29 ENCOUNTER — TELEPHONE (OUTPATIENT)
Dept: SURGERY | Facility: CLINIC | Age: 49
End: 2024-05-29
Payer: COMMERCIAL

## 2024-05-29 NOTE — TELEPHONE ENCOUNTER
PA Initiation    Medication: WEGOVY 2.4 MG/0.75ML SC SOAJ  Insurance Company: [employer] - Phone [phone] Fax [phone]  Pharmacy Filling the Rx: [facility] - [place], [place] - [address]  Filling Pharmacy Phone: [phone]  Filling Pharmacy Fax:    Start Date: 5/29/2024   Pa renewal started by phone, current pa expires on 06/05/2024  Ref# [id number]

## 2024-06-10 ENCOUNTER — TELEPHONE (OUTPATIENT)
Dept: CARDIOLOGY | Facility: CLINIC | Age: 49
End: 2024-06-10
Payer: COMMERCIAL

## 2024-06-19 NOTE — TELEPHONE ENCOUNTER
06/19 called [facility] to check on PA status, they stated they needed additional info, which I faxed back on 06/03/2024  Rep took info verbally, we should have an answer soon

## 2024-06-20 NOTE — TELEPHONE ENCOUNTER
Prior Authorization Approval    Medication: WEGOVY 2.4 MG/0.75ML SC SOAJ  Authorization Effective Date: 6/20/2024  Authorization Expiration Date: 6/19/2025  Approved Dose/Quantity: 3  Reference #: [id number]   Insurance Company: [employer] - Phone [phone] Fax [phone]  Expected CoPay: $    CoPay Card Available:      Financial Assistance Needed:    Which Pharmacy is filling the prescription: [facility] - [place], [place] - [facility] [address]  Pharmacy Notified: yes  Patient Notified: no

## 2024-09-12 ENCOUNTER — APPOINTMENT (OUTPATIENT)
Dept: LAB | Age: 49
End: 2024-09-12

## 2024-09-12 ENCOUNTER — OFFICE VISIT (OUTPATIENT)
Dept: INTERNAL MEDICINE | Age: 49
End: 2024-09-12

## 2024-09-12 VITALS
TEMPERATURE: 99.2 F | DIASTOLIC BLOOD PRESSURE: 80 MMHG | WEIGHT: 256.3 LBS | HEART RATE: 77 BPM | BODY MASS INDEX: 41.19 KG/M2 | HEIGHT: 66 IN | SYSTOLIC BLOOD PRESSURE: 118 MMHG

## 2024-09-12 DIAGNOSIS — R50.81 FEVER IN OTHER DISEASES: ICD-10-CM

## 2024-09-12 DIAGNOSIS — R12 HEARTBURN: ICD-10-CM

## 2024-09-12 DIAGNOSIS — R10.84 ABDOMINAL PAIN, ACUTE, GENERALIZED: Primary | ICD-10-CM

## 2024-09-12 DIAGNOSIS — R11.2 NAUSEA AND VOMITING, UNSPECIFIED VOMITING TYPE: ICD-10-CM

## 2024-09-12 DIAGNOSIS — R14.0 BLOATING: ICD-10-CM

## 2024-09-12 DIAGNOSIS — R19.7 DIARRHEA, UNSPECIFIED TYPE: ICD-10-CM

## 2024-09-12 PROBLEM — F51.04 PSYCHOPHYSIOLOGICAL INSOMNIA: Status: ACTIVE | Noted: 2022-12-09

## 2024-09-12 PROBLEM — N95.1 MENOPAUSAL SYNDROME (HOT FLASHES): Status: ACTIVE | Noted: 2022-12-09

## 2024-09-12 PROBLEM — F33.42 RECURRENT MAJOR DEPRESSIVE DISORDER, IN FULL REMISSION (CMD): Status: ACTIVE | Noted: 2019-04-26

## 2024-09-12 PROBLEM — F43.10 PTSD (POST-TRAUMATIC STRESS DISORDER): Status: ACTIVE | Noted: 2022-06-29

## 2024-09-12 PROBLEM — L73.2 HIDRADENITIS SUPPURATIVA: Status: ACTIVE | Noted: 2023-08-01

## 2024-09-12 PROBLEM — G25.81 RESTLESS LEGS SYNDROME: Status: ACTIVE | Noted: 2020-10-02

## 2024-09-12 PROCEDURE — 99204 OFFICE O/P NEW MOD 45 MIN: CPT | Performed by: INTERNAL MEDICINE

## 2024-09-12 RX ORDER — LORATADINE 10 MG/1
10 TABLET ORAL DAILY
COMMUNITY

## 2024-09-12 RX ORDER — PNV NO.95/FERROUS FUM/FOLIC AC 28MG-0.8MG
325 TABLET ORAL DAILY
COMMUNITY

## 2024-09-12 RX ORDER — DULOXETIN HYDROCHLORIDE 60 MG/1
60 CAPSULE, DELAYED RELEASE ORAL DAILY
COMMUNITY
Start: 2024-06-20

## 2024-09-12 RX ORDER — CLINDAMYCIN PHOSPHATE 10 MG/G
GEL TOPICAL
COMMUNITY
Start: 2024-08-21

## 2024-09-12 RX ORDER — SEMAGLUTIDE 2.4 MG/.75ML
2.4 INJECTION, SOLUTION SUBCUTANEOUS
COMMUNITY
Start: 2024-08-24

## 2024-09-12 RX ORDER — PROGESTERONE 100 MG/1
100 CAPSULE ORAL
COMMUNITY
Start: 2024-08-21

## 2024-09-12 RX ORDER — ESTRADIOL 0.05 MG/D
1 PATCH, EXTENDED RELEASE TRANSDERMAL
COMMUNITY
Start: 2024-08-21

## 2024-09-12 RX ORDER — TRAZODONE HYDROCHLORIDE 50 MG/1
50 TABLET, FILM COATED ORAL NIGHTLY
COMMUNITY

## 2024-09-12 RX ORDER — LEVOTHYROXINE SODIUM 137 UG/1
137 TABLET ORAL DAILY
COMMUNITY
Start: 2024-06-20

## 2024-09-12 ASSESSMENT — PATIENT HEALTH QUESTIONNAIRE - PHQ9
SUM OF ALL RESPONSES TO PHQ9 QUESTIONS 1 AND 2: 0
CLINICAL INTERPRETATION OF PHQ2 SCORE: NO FURTHER SCREENING NEEDED
SUM OF ALL RESPONSES TO PHQ9 QUESTIONS 1 AND 2: 0
1. LITTLE INTEREST OR PLEASURE IN DOING THINGS: NOT AT ALL
2. FEELING DOWN, DEPRESSED OR HOPELESS: NOT AT ALL

## 2024-09-13 ENCOUNTER — LAB SERVICES (OUTPATIENT)
Dept: LAB | Age: 49
End: 2024-09-13

## 2024-09-13 DIAGNOSIS — R14.0 BLOATING: ICD-10-CM

## 2024-09-13 DIAGNOSIS — R50.81 FEVER IN OTHER DISEASES: ICD-10-CM

## 2024-09-13 DIAGNOSIS — R19.7 DIARRHEA, UNSPECIFIED TYPE: ICD-10-CM

## 2024-09-13 LAB — C DIFF TOX B TCDB STL QL NAA+PROBE: NOT DETECTED

## 2024-09-13 PROCEDURE — 87449 NOS EACH ORGANISM AG IA: CPT

## 2024-09-13 PROCEDURE — 87427 SHIGA-LIKE TOXIN AG IA: CPT

## 2024-09-13 PROCEDURE — 87046 STOOL CULTR AEROBIC BACT EA: CPT

## 2024-09-13 PROCEDURE — 87493 C DIFF AMPLIFIED PROBE: CPT

## 2024-09-15 LAB — BACTERIA STL CULT: NORMAL

## 2024-09-16 LAB
C JEJUNI+C COLI AG STL QL: NORMAL
E COLI SHIGA-LIKE TOXIN 1+2 STL QL IA: NORMAL

## 2024-10-16 NOTE — PROGRESS NOTES
ASSESSMENT & PLAN    1. Arthralgia of right lower leg    2. Patellofemoral pain syndrome of right knee    3. Weakness of right hip      Discussed xray - no significant arthritis, lateral patellar tilt/subluxation  Discussed exam - Need to strengthen your thigh, hip and buttock. Knee is otherwise stable and no effusion  Would also benefit from kinesiotaping   Activity modification as discussed - squats, lunges, stair, sit-to-stand and kneeling will bother you  Would not do more than 3x/week with squats/lunges for exercise. Use lighter weight and more reps.  Physical therapy: [facility] - [phone]  Can take up to 8 weeks to change your strength    Follow up after 8 PT sessions.    -----    SUBJECTIVE  Kaleigh Nam is a/an 43 year old female who is seen as a self referral for evaluation of right knee pain. The patient is seen by themselves.    Onset: 5 month(s) ago. Denies any trauma, started exercising more the beginning of the year including a lot of lunges and squats  Location of Pain: right anterior knee pain that has spread to the medial and lateral joint lines  Rating of Pain at worst: 7/10  Rating of Pain Currently: 1/10  Worsened by: stairs, squatting, hiking, pounding exercises, mowing, walking on uneven ground  Better with: rest  Treatments tried: rest/activity avoidance, ice and stretching  Associated symptoms: mild swelling,  loss of knee flexion, weakness/instability, catching  Orthopedic history: left knee meniscal tear  Relevant surgical history: L knee arthroscopy, partial lateral meniscectomy, DOS 4/6/2016, Dr. Fernandez  Patient Social History: works as a NICU nurse    Patient's past medical, surgical, social, and family histories were reviewed today and no changes are noted.    REVIEW OF SYSTEMS:  10 point ROS is negative other than symptoms noted above in HPI, Past Medical History or as stated below  Constitutional: NEGATIVE for fever, chills, change in weight  Skin:  "NEGATIVE for worrisome rashes, moles or lesions  GI/: NEGATIVE for bowel or bladder changes  Neuro: NEGATIVE for weakness, dizziness or paresthesias    OBJECTIVE:  /78  Ht 5' 5.5\" (1.664 m)  Wt 227 lb (103 kg)  BMI 37.2 kg/m2   General: healthy, alert and in no distress  HEENT: no scleral icterus or conjunctival erythema  Skin: no suspicious lesions or rash. No jaundice.  CV: no pedal edema  Resp: normal respiratory effort without conversational dyspnea   Psych: normal mood and affect  Gait: normal steady gait with appropriate coordination and balance  Neuro: Normal light sensory exam of lower extremity  MSK:  RIGHT KNEE  Inspection:    normal alignment  Palpation:    Tender about the medial patellar facet and medial joint line. Remainder of bony and ligamentous landmarks are nontender.    No effusion is present    Patellofemoral crepitus is Present  Range of Motion:     00 extension to 1350 flexion  Strength:    Quadriceps and gluteus weakness     Extensor mechanism intact  Special Tests:    Negative: MCL/valgus stress (0 & 30 deg), LCL/varus stress (0 & 30 deg), Lachman's, posterior drawer, Jeaneth's    Independent visualization of the below image:  Recent Results (from the past 24 hour(s))   XR Knee Standing AP Bilat [place] Bilat Lat Right    Narrative    KNEE STANDING AP BILATERAL [place] BILATERAL LATERAL RIGHT 7/19/2018  1:47 PM     HISTORY: Arthralgia of right lower leg.       Impression    IMPRESSION:  1. Right knee 3 views: Joint effusion. There appears to be cystic  resorptive change at the tibial spine region. Mild lateral patellar  subluxation and tilting.  2. Left knee 2 views: Mild lateral compartment narrowing.    RA MANZO MD     Patient's conditions were thoroughly discussed during today's visit with greater than 50% of the visit spent counseling the patient with total time spent face-to-face with the patient being 20 minutes.    Cecilia Almanza, DO [facility]  [employer] [facility] and " Orthopedic Care     Additional Notes: Referred to Dr. Freeman for treatment Detail Level: Detailed Render Risk Assessment In Note?: no

## 2025-03-03 ENCOUNTER — OFFICE VISIT (OUTPATIENT)
Dept: PEDIATRICS | Facility: CLINIC | Age: 50
End: 2025-03-03
Attending: INTERNAL MEDICINE
Payer: COMMERCIAL

## 2025-03-03 VITALS
RESPIRATION RATE: 16 BRPM | HEIGHT: 65 IN | DIASTOLIC BLOOD PRESSURE: 80 MMHG | OXYGEN SATURATION: 98 % | SYSTOLIC BLOOD PRESSURE: 120 MMHG | HEART RATE: 82 BPM | TEMPERATURE: 96.8 F | BODY MASS INDEX: 44.93 KG/M2 | WEIGHT: 269.7 LBS

## 2025-03-03 DIAGNOSIS — L73.2 HIDRADENITIS SUPPURATIVA: ICD-10-CM

## 2025-03-03 DIAGNOSIS — Z12.31 VISIT FOR SCREENING MAMMOGRAM: ICD-10-CM

## 2025-03-03 DIAGNOSIS — Z29.89 ALTITUDE SICKNESS PROPHYLAXIS: ICD-10-CM

## 2025-03-03 DIAGNOSIS — E66.01 CLASS 3 SEVERE OBESITY DUE TO EXCESS CALORIES WITH SERIOUS COMORBIDITY AND BODY MASS INDEX (BMI) OF 40.0 TO 44.9 IN ADULT (H): ICD-10-CM

## 2025-03-03 DIAGNOSIS — Z13.1 SCREENING FOR DIABETES MELLITUS: ICD-10-CM

## 2025-03-03 DIAGNOSIS — G25.81 RESTLESS LEGS SYNDROME: ICD-10-CM

## 2025-03-03 DIAGNOSIS — Z79.890 HORMONE REPLACEMENT THERAPY (HRT): ICD-10-CM

## 2025-03-03 DIAGNOSIS — R11.0 NAUSEA: ICD-10-CM

## 2025-03-03 DIAGNOSIS — F51.04 PSYCHOPHYSIOLOGICAL INSOMNIA: ICD-10-CM

## 2025-03-03 DIAGNOSIS — L65.8 FEMALE PATTERN HAIR LOSS: ICD-10-CM

## 2025-03-03 DIAGNOSIS — Z00.00 ROUTINE GENERAL MEDICAL EXAMINATION AT A HEALTH CARE FACILITY: Primary | ICD-10-CM

## 2025-03-03 DIAGNOSIS — Z13.220 LIPID SCREENING: ICD-10-CM

## 2025-03-03 DIAGNOSIS — E89.0 HYPOTHYROIDISM, POSTSURGICAL: ICD-10-CM

## 2025-03-03 DIAGNOSIS — Z12.31 ENCOUNTER FOR SCREENING MAMMOGRAM FOR BREAST CANCER: ICD-10-CM

## 2025-03-03 DIAGNOSIS — F33.42 RECURRENT MAJOR DEPRESSIVE DISORDER, IN FULL REMISSION: ICD-10-CM

## 2025-03-03 DIAGNOSIS — R79.0 LOW FERRITIN: ICD-10-CM

## 2025-03-03 DIAGNOSIS — L24.9 IRRITANT DERMATITIS: ICD-10-CM

## 2025-03-03 DIAGNOSIS — F43.10 PTSD (POST-TRAUMATIC STRESS DISORDER): ICD-10-CM

## 2025-03-03 DIAGNOSIS — N95.1 MENOPAUSAL SYNDROME (HOT FLASHES): ICD-10-CM

## 2025-03-03 DIAGNOSIS — E66.813 CLASS 3 SEVERE OBESITY DUE TO EXCESS CALORIES WITH SERIOUS COMORBIDITY AND BODY MASS INDEX (BMI) OF 40.0 TO 44.9 IN ADULT (H): ICD-10-CM

## 2025-03-03 DIAGNOSIS — E55.9 VITAMIN D DEFICIENCY: ICD-10-CM

## 2025-03-03 PROBLEM — R93.0 ABNORMAL MRI OF THE HEAD: Status: ACTIVE | Noted: 2025-03-03

## 2025-03-03 LAB
ALBUMIN SERPL BCG-MCNC: 4 G/DL (ref 3.5–5.2)
ALP SERPL-CCNC: 77 U/L (ref 40–150)
ALT SERPL W P-5'-P-CCNC: 14 U/L (ref 0–50)
ANION GAP SERPL CALCULATED.3IONS-SCNC: 10 MMOL/L (ref 7–15)
AST SERPL W P-5'-P-CCNC: 22 U/L (ref 0–45)
BILIRUB SERPL-MCNC: 0.3 MG/DL
BUN SERPL-MCNC: 16 MG/DL (ref 6–20)
CALCIUM SERPL-MCNC: 9.4 MG/DL (ref 8.8–10.4)
CHLORIDE SERPL-SCNC: 105 MMOL/L (ref 98–107)
CHOLEST SERPL-MCNC: 194 MG/DL
CREAT SERPL-MCNC: 0.7 MG/DL (ref 0.51–0.95)
EGFRCR SERPLBLD CKD-EPI 2021: >90 ML/MIN/1.73M2
EST. AVERAGE GLUCOSE BLD GHB EST-MCNC: 103 MG/DL
FASTING STATUS PATIENT QL REPORTED: NO
FASTING STATUS PATIENT QL REPORTED: NO
FERRITIN SERPL-MCNC: 51 NG/ML (ref 11–328)
GLUCOSE SERPL-MCNC: 106 MG/DL (ref 70–99)
HBA1C MFR BLD: 5.2 % (ref 0–5.6)
HCO3 SERPL-SCNC: 26 MMOL/L (ref 22–29)
HDLC SERPL-MCNC: 71 MG/DL
LDLC SERPL CALC-MCNC: 111 MG/DL
NONHDLC SERPL-MCNC: 123 MG/DL
POTASSIUM SERPL-SCNC: 4.5 MMOL/L (ref 3.4–5.3)
PROT SERPL-MCNC: 6.9 G/DL (ref 6.4–8.3)
SODIUM SERPL-SCNC: 141 MMOL/L (ref 135–145)
TRIGL SERPL-MCNC: 59 MG/DL
TSH SERPL DL<=0.005 MIU/L-ACNC: 2.23 UIU/ML (ref 0.3–4.2)
VIT D+METAB SERPL-MCNC: 29 NG/ML (ref 20–50)

## 2025-03-03 PROCEDURE — 1126F AMNT PAIN NOTED NONE PRSNT: CPT | Performed by: INTERNAL MEDICINE

## 2025-03-03 PROCEDURE — 83036 HEMOGLOBIN GLYCOSYLATED A1C: CPT | Performed by: INTERNAL MEDICINE

## 2025-03-03 PROCEDURE — 90471 IMMUNIZATION ADMIN: CPT | Performed by: INTERNAL MEDICINE

## 2025-03-03 PROCEDURE — 3079F DIAST BP 80-89 MM HG: CPT | Performed by: INTERNAL MEDICINE

## 2025-03-03 PROCEDURE — 36415 COLL VENOUS BLD VENIPUNCTURE: CPT | Performed by: INTERNAL MEDICINE

## 2025-03-03 PROCEDURE — 3074F SYST BP LT 130 MM HG: CPT | Performed by: INTERNAL MEDICINE

## 2025-03-03 PROCEDURE — G2211 COMPLEX E/M VISIT ADD ON: HCPCS | Performed by: INTERNAL MEDICINE

## 2025-03-03 PROCEDURE — 90480 ADMN SARSCOV2 VAC 1/ONLY CMP: CPT | Performed by: INTERNAL MEDICINE

## 2025-03-03 PROCEDURE — 91320 SARSCV2 VAC 30MCG TRS-SUC IM: CPT | Performed by: INTERNAL MEDICINE

## 2025-03-03 PROCEDURE — 82728 ASSAY OF FERRITIN: CPT | Performed by: INTERNAL MEDICINE

## 2025-03-03 PROCEDURE — 99214 OFFICE O/P EST MOD 30 MIN: CPT | Mod: 25 | Performed by: INTERNAL MEDICINE

## 2025-03-03 PROCEDURE — 80061 LIPID PANEL: CPT | Performed by: INTERNAL MEDICINE

## 2025-03-03 PROCEDURE — 99396 PREV VISIT EST AGE 40-64: CPT | Mod: 25 | Performed by: INTERNAL MEDICINE

## 2025-03-03 PROCEDURE — 90677 PCV20 VACCINE IM: CPT | Performed by: INTERNAL MEDICINE

## 2025-03-03 PROCEDURE — 82306 VITAMIN D 25 HYDROXY: CPT | Performed by: INTERNAL MEDICINE

## 2025-03-03 PROCEDURE — 80053 COMPREHEN METABOLIC PANEL: CPT | Performed by: INTERNAL MEDICINE

## 2025-03-03 PROCEDURE — 84443 ASSAY THYROID STIM HORMONE: CPT | Performed by: INTERNAL MEDICINE

## 2025-03-03 RX ORDER — TRIAMCINOLONE ACETONIDE 1 MG/G
CREAM TOPICAL
Qty: 80 G | Refills: 2 | Status: SHIPPED | OUTPATIENT
Start: 2025-03-03

## 2025-03-03 RX ORDER — ESTRADIOL 0.05 MG/D
1 PATCH, EXTENDED RELEASE TRANSDERMAL
Qty: 24 PATCH | Refills: 4 | Status: SHIPPED | OUTPATIENT
Start: 2025-03-03

## 2025-03-03 RX ORDER — SUMATRIPTAN SUCCINATE 25 MG/1
25-50 TABLET ORAL
Qty: 20 TABLET | Refills: 3 | Status: SHIPPED | OUTPATIENT
Start: 2025-03-03

## 2025-03-03 RX ORDER — SPIRONOLACTONE 50 MG/1
TABLET, FILM COATED ORAL
Qty: 180 TABLET | Refills: 3 | Status: SHIPPED | OUTPATIENT
Start: 2025-03-03

## 2025-03-03 RX ORDER — FERROUS SULFATE 325(65) MG
325 TABLET ORAL
Qty: 90 TABLET | Refills: 3 | Status: SHIPPED | OUTPATIENT
Start: 2025-03-03

## 2025-03-03 RX ORDER — DULOXETIN HYDROCHLORIDE 60 MG/1
CAPSULE, DELAYED RELEASE ORAL
Qty: 90 CAPSULE | Refills: 3 | Status: SHIPPED | OUTPATIENT
Start: 2025-03-03

## 2025-03-03 RX ORDER — TRAZODONE HYDROCHLORIDE 50 MG/1
25-50 TABLET ORAL
Qty: 90 TABLET | Refills: 3 | Status: SHIPPED | OUTPATIENT
Start: 2025-03-03

## 2025-03-03 RX ORDER — CLINDAMYCIN PHOSPHATE 10 MG/G
GEL TOPICAL 2 TIMES DAILY
Qty: 60 G | Refills: 11 | Status: SHIPPED | OUTPATIENT
Start: 2025-03-03

## 2025-03-03 RX ORDER — ONDANSETRON 4 MG/1
4 TABLET, ORALLY DISINTEGRATING ORAL EVERY 8 HOURS PRN
Qty: 20 TABLET | Refills: 3 | Status: SHIPPED | OUTPATIENT
Start: 2025-03-03

## 2025-03-03 RX ORDER — LEVOTHYROXINE SODIUM 137 UG/1
137 TABLET ORAL DAILY
Qty: 90 TABLET | Refills: 3 | Status: SHIPPED | OUTPATIENT
Start: 2025-03-03

## 2025-03-03 RX ORDER — PROGESTERONE 100 MG/1
CAPSULE ORAL
Qty: 90 CAPSULE | Refills: 3 | Status: SHIPPED | OUTPATIENT
Start: 2025-03-03

## 2025-03-03 SDOH — HEALTH STABILITY: PHYSICAL HEALTH: ON AVERAGE, HOW MANY DAYS PER WEEK DO YOU ENGAGE IN MODERATE TO STRENUOUS EXERCISE (LIKE A BRISK WALK)?: 2 DAYS

## 2025-03-03 SDOH — HEALTH STABILITY: PHYSICAL HEALTH: ON AVERAGE, HOW MANY MINUTES DO YOU ENGAGE IN EXERCISE AT THIS LEVEL?: 30 MIN

## 2025-03-03 ASSESSMENT — PAIN SCALES - GENERAL: PAINLEVEL_OUTOF10: NO PAIN (0)

## 2025-03-03 ASSESSMENT — PATIENT HEALTH QUESTIONNAIRE - PHQ9
SUM OF ALL RESPONSES TO PHQ QUESTIONS 1-9: 2
10. IF YOU CHECKED OFF ANY PROBLEMS, HOW DIFFICULT HAVE THESE PROBLEMS MADE IT FOR YOU TO DO YOUR WORK, TAKE CARE OF THINGS AT HOME, OR GET ALONG WITH OTHER PEOPLE: NOT DIFFICULT AT ALL
SUM OF ALL RESPONSES TO PHQ QUESTIONS 1-9: 2

## 2025-03-03 ASSESSMENT — SOCIAL DETERMINANTS OF HEALTH (SDOH): HOW OFTEN DO YOU GET TOGETHER WITH FRIENDS OR RELATIVES?: ONCE A WEEK

## 2025-03-03 NOTE — PROGRESS NOTES
Preventive Care Visit  [facility] HAWA Bang MD, Internal Medicine - Pediatrics  Mar 3, 2025      Assessment & Plan       ICD-10-CM    1. Routine general medical examination at a health care facility  Z00.00       2. Hormone replacement therapy (HRT) - started 12/2022  Z79.890 estradiol (VIVELLE-DOT) 0.05 MG/24HR bi-weekly patch     progesterone (PROMETRIUM) 100 MG capsule      3. Menopausal syndrome (hot flashes)  N95.1 estradiol (VIVELLE-DOT) 0.05 MG/24HR bi-weekly patch     progesterone (PROMETRIUM) 100 MG capsule      4. Recurrent major depressive disorder, in full remission  F33.42 DULoxetine (CYMBALTA) 60 MG capsule      5. PTSD (post-traumatic stress disorder)  F43.10       6. Psychophysiological insomnia  F51.04 traZODone (DESYREL) 50 MG tablet      7. Class 3 severe obesity due to excess calories with serious comorbidity and body mass index (BMI) of 40.0 to 44.9 in adult (H)  E66.813 Comprehensive metabolic panel    E66.01 Hemoglobin A1c    Z68.41       8. Hypothyroidism, postsurgical  E89.0 TSH WITH FREE T4 REFLEX     levothyroxine (SYNTHROID/LEVOTHROID) 137 MCG tablet      9. Vitamin D deficiency  E55.9 Vitamin D Deficiency      10. Hidradenitis suppurativa - Derm  L73.2 clindamycin (CLEOCIN-T) 1 % external gel      11. Female pattern hair loss  L65.8 spironolactone (ALDACTONE) 50 MG tablet      12. Restless legs syndrome  G25.81       13. Low ferritin  R79.0 ferrous sulfate (FEROSUL) 325 (65 Fe) MG tablet     Ferritin      14. Irritant dermatitis  L24.9 triamcinolone (KENALOG) 0.1 % external cream      15. Nausea  R11.0 ondansetron (ZOFRAN ODT) 4 MG ODT tab      16. Altitude sickness prophylaxis  Z29.89 SUMAtriptan (IMITREX) 25 MG tablet      17. Encounter for screening mammogram for breast cancer  Z12.31 MA Screening Bilateral w/ Mendoza      18. Lipid screening  Z13.220 Lipid panel reflex to direct LDL Fasting     Comprehensive metabolic panel      19. Screening for  "diabetes mellitus  Z13.1 Hemoglobin A1c      20. Visit for screening mammogram  Z12.31         HRT helpful for hot flashes.   Having more hair loss. Discussed FPHL - Rogaine + spironolactone.   Mental health in a good place - no plans to wean.   Watching headaches - see PI.  Will get shingles vaccine at another time.       BMI  Estimated body mass index is 44.88 kg/m  as calculated from the following:    Height as of this encounter: 1.651 m (5' 5\").    Weight as of this encounter: 122.3 kg (269 lb 11.2 oz).     Counseling  Appropriate preventive services were addressed with this patient via screening, questionnaire, or discussion as appropriate for fall prevention, nutrition, physical activity, Tobacco-use cessation, social engagement, weight loss and cognition.  Checklist reviewing preventive services available has been given to the patient.  Reviewed patient's diet, addressing concerns and/or questions.   She is at risk for lack of exercise and has been provided with information to increase physical activity for the benefit of her well-being.   She is at risk for psychosocial distress and has been provided with information to reduce risk.       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 -> doesn't take trazodone    # Atypical migraine  - nubmness/tingling on left side of face  - had stroke w/up    # Weight  Wt Readings from Last 4 Encounters:   03/03/25 122.3 kg (269 lb 11.2 oz)   02/22/24 116.6 kg (257 lb)   01/15/24 114.3 kg (252 lb)   11/02/23 115.2 kg (254 lb)   - wegovy wasn't covered    # Thyroid  TSH   Date Value Ref Range Status   02/28/2024 0.68 0.30 - 4.20 uIU/mL Final   12/03/2021 0.77 0.40 " - 4.00 mU/L Final   10/02/2020 2.38 0.40 - 4.00 mU/L Final   - synthroid 137    # Depression   # PTSD      12/9/2022    10:17 AM 2/21/2024    11:49 AM 3/3/2025     1:10 PM   PHQ   PHQ-9 Total Score 3 3 2    Q9: Thoughts of better off dead/self-harm past 2 weeks Not at all  Not at all Not at all       Patient-reported    Proxy-reported     - trazodone 25-50 at bedtime prn  - cymbalta 60    # Menopause   - estradiol 0.05 bi weekly patch  - progesterone 100 mg daily  - hot flashes are pretty good  - just this month period is late  - more frequent migraines, hair loss, waking up in the middle of the night    # RLS  - better w/ iron     # Migraine   - maybe 3-5 days a month  - scared to take imitrex   - imitrex prn    # Hidradenitis suppurativa  - clinda gel  - triamcinolone lotion 0.1%    # Vitamin d def  Vitamin D Deficiency Screening Results:  Lab Results   Component Value Date    VITDT 26 02/28/2024    VITDT 29 10/02/2020    VITDT 47 09/06/2017    VITDT 20 06/22/2017    VITDT 25 (L) 06/26/2013     # HCM  - mammo due    Advance Care Planning  Patient does not have a Health Care Directive: Discussed advance care planning with patient; however, patient declined at this time.      3/3/2025   General Health   How would you rate your overall physical health? Good   Feel stress (tense, anxious, or unable to sleep) To some extent   (!) STRESS CONCERN      3/3/2025   Nutrition   Three or more servings of calcium each day? Yes   Diet: Regular (no restrictions)   How many servings of fruit and vegetables per day? 4 or more   How many sweetened beverages each day? 0-1         3/3/2025   Exercise   Days per week of moderate/strenous exercise 2 days   Average minutes spent exercising at this level 30 min   (!) EXERCISE CONCERN      3/3/2025   Social Factors   Frequency of gathering with friends or relatives Once a week   Worry food won't last until get money to buy more No   Food not last or not have enough money for food? No   Do  you have housing? (Housing is defined as stable permanent housing and does not include staying ouside in a car, in a tent, in an abandoned building, in an overnight shelter, or couch-surfing.) Yes   Are you worried about losing your housing? No   Lack of transportation? No   Unable to get utilities (heat,electricity)? No         3/3/2025   Fall Risk   Fallen 2 or more times in the past year? No   Trouble with walking or balance? No          3/3/2025   Dental   Dentist two times every year? Yes         2/21/2024   TB Screening   Were you born outside of the [place]? (!) YES         Today's PHQ-9 Score:       3/3/2025     1:10 PM   PHQ-9 SCORE   PHQ-9 Total Score MyChart 2 (Minimal depression)   PHQ-9 Total Score 2        Patient-reported         3/3/2025   Substance Use   Alcohol more than 3/day or more than 7/wk Not Applicable   Do you use any other substances recreationally? No     Social History     Tobacco Use    Smoking status: Never     Passive exposure: Never    Smokeless tobacco: Never   Vaping Use    Vaping status: Never Used   Substance Use Topics    Alcohol use: Yes     Alcohol/week: 3.0 standard drinks of alcohol     Comment: 1 month    Drug use: No           2/22/2024   LAST FHS-7 RESULTS   1st degree relative breast or ovarian cancer No   Any relative bilateral breast cancer No   Any male have breast cancer No   Any ONE woman have BOTH breast AND ovarian cancer No   Any woman with breast cancer before 50yrs No   2 or more relatives with breast AND/OR ovarian cancer No   2 or more relatives with breast AND/OR bowel cancer No        Mammogram Screening - Mammogram every 1-2 years updated in Health Maintenance based on mutual decision making        3/3/2025   STI Screening   New sexual partner(s) since last STI/HIV test? No     History of abnormal Pap smear: No - age 30- 64 PAP with HPV every 5 years recommended        Latest Ref Rng & Units 12/9/2022    11:04 AM 6/22/2017     9:23 AM 6/22/2017     8:40 AM  "  PAP / HPV   PAP  Negative for Intraepithelial Lesion or Malignancy (NILM)      PAP (Historical)   NIL     HPV 16 DNA Negative Negative   Negative    HPV 18 DNA Negative Negative   Negative    Other HR HPV Negative Negative   Negative      ASCVD Risk   The 10-year ASCVD risk score (Joy GARCIA, et al., 2019) is: 0.9%    Values used to calculate the score:      Age: 50 years      Sex: Female      Is Non- : No      Diabetic: No      Tobacco smoker: No      Systolic Blood Pressure: 120 mmHg      Is BP treated: No      HDL Cholesterol: 67 mg/dL      Total Cholesterol: 178 mg/dL         Reviewed and updated as needed this visit by Provider                         Objective    Exam  /80   Pulse 82   Temp 96.8  F (36  C) (Temporal)   Resp 16   Ht 1.651 m (5' 5\")   Wt 122.3 kg (269 lb 11.2 oz)   LMP 01/17/2025   SpO2 98%   BMI 44.88 kg/m     Estimated body mass index is 44.88 kg/m  as calculated from the following:    Height as of this encounter: 1.651 m (5' 5\").    Weight as of this encounter: 122.3 kg (269 lb 11.2 oz).    Physical Exam  GENERAL: alert and no distress  EYES: Eyes grossly normal to inspection, PERRL and conjunctivae and sclerae normal  HENT: ear canals and TM's normal, nose and mouth without ulcers or lesions  NECK: no adenopathy, no asymmetry, masses, or scars  RESP: lungs clear to auscultation - no rales, rhonchi or wheezes  CV: regular rate and rhythm, normal S1 S2, no S3 or S4, no murmur, click or rub, no peripheral edema  ABDOMEN: soft, nontender, no hepatosplenomegaly, no masses and bowel sounds normal  MS: no gross musculoskeletal defects noted, no edema  SKIN: no suspicious lesions or rashes  NEURO: Normal strength and tone, mentation intact and speech normal  PSYCH: mentation appears normal, affect normal/bright      The longitudinal plan of care for the diagnosis(es)/condition(s) as documented were addressed during this visit. Due to the added " complexity in care, I will continue to support Carin in the subsequent management and with ongoing continuity of care.  Signed Electronically by: Dusty Bang MD

## 2025-03-03 NOTE — PROGRESS NOTES
# Weight  Wt Readings from Last 4 Encounters:   02/22/24 116.6 kg (257 lb)   01/15/24 114.3 kg (252 lb)   11/02/23 115.2 kg (254 lb)   06/06/23 124.3 kg (274 lb)   - wegovy***    # Thyroid  TSH   Date Value Ref Range Status   02/28/2024 0.68 0.30 - 4.20 uIU/mL Final   12/03/2021 0.77 0.40 - 4.00 mU/L Final   10/02/2020 2.38 0.40 - 4.00 mU/L Final   - synthroid 137    # Depression   # PTSD      6/29/2022     9:31 AM 12/9/2022    10:17 AM 2/21/2024    11:49 AM   PHQ   PHQ-9 Total Score 10 3 3   Q9: Thoughts of better off dead/self-harm past 2 weeks Not at all Not at all  Not at all       Proxy-reported     - trazodone 25-50 at bedtime prn  - cymbalta 60    # Menopause   - estradiol 0.05 bi weekly patch  - progesterone 100 mg daily    # RLS    # Migraine   - imitrex prn    # Hidradenitis suppurativa  - clinda gel  - triamcinolone lotion 0.1%    # Vitamin d def  Vitamin D Deficiency Screening Results:  Lab Results   Component Value Date    VITDT 26 02/28/2024    VITDT 29 10/02/2020    VITDT 47 09/06/2017    VITDT 20 06/22/2017    VITDT 25 (L) 06/26/2013     # HCM  - mammo due

## 2025-03-03 NOTE — PATIENT INSTRUCTIONS
Female pattern hair loss  Spironolactone    Rogaine  ---- Need to use indefinitely, once stopped, any hair regrowth eventually will be lost, often over the course of several months.  ---- Scalp treatment, not a hair treatment  ---- Apply 1 mL of minoxidil 5% solution or one half of a capful of the 5% foam twice daily to involved areas on a dry scalp. The solution can be spread lightly with a finger; massage is not needed.  ---- Hair shedding may occur at the initiation of treatment. The increased hair loss usually resolves within two months.   ---- Must be used twice a day for at least four months prior to evaluating the initial response to therapy.  Hair growth may be seen within four to eight months and stabilizes at 12 to 18 months. A full year of treatment is recommended before deciding if it works.    Migraines  - try imitrex at home on a weekend  - can take 25mg if you want.       If you are over 50 I recommend the Shingrix vaccine. Two doses of Shingrix provides more than 90% protection against shingles and postherpetic neuralgia (PHN), a type of chronic pain that is the most common complication of shingles.   - even if you've had the older shingles vaccine (Zostavax) it's okay to get the new vaccine.   - even if you've had singles before the vaccine can be helpful.    The vaccine is a two shot series - I recommend getting the second shot 2-6 months after the first dose.    You may have a sore arm and feel mild flu-like symptoms for a day or two after the vaccine. Most people do not need to adjust their regular activities. It's okay to take tylenol or ibuprofen if you have side effects.      Patient Education   Preventive Care Advice   This is general advice given by our system to help you stay healthy. However, your care team may have specific advice just for you. Please talk to your care team about your preventive care needs.  Nutrition  Eat 5 or more servings of fruits and vegetables each day.  Try wheat  bread, brown rice and whole grain pasta (instead of white bread, rice, and pasta).  Get enough calcium and vitamin D. Check the label on foods and aim for 100% of the RDA (recommended daily allowance).  Lifestyle  Exercise at least 150 minutes each week  (30 minutes a day, 5 days a week).  Do muscle strengthening activities 2 days a week. These help control your weight and prevent disease.  No smoking.  Wear sunscreen to prevent skin cancer.  Have a dental exam and cleaning every 6 months.  Yearly exams  See your health care team every year to talk about:  Any changes in your health.  Any medicines your care team has prescribed.  Preventive care, family planning, and ways to prevent chronic diseases.  Shots (vaccines)   HPV shots (up to age 26), if you've never had them before.  Hepatitis B shots (up to age 59), if you've never had them before.  COVID-19 shot: Get this shot when it's due.  Flu shot: Get a flu shot every year.  Tetanus shot: Get a tetanus shot every 10 years.  Pneumococcal, hepatitis A, and RSV shots: Ask your care team if you need these based on your risk.  Shingles shot (for age 50 and up)  General health tests  Diabetes screening:  Starting at age 35, Get screened for diabetes at least every 3 years.  If you are younger than age 35, ask your care team if you should be screened for diabetes.  Cholesterol test: At age 39, start having a cholesterol test every 5 years, or more often if advised.  Bone density scan (DEXA): At age 50, ask your care team if you should have this scan for osteoporosis (brittle bones).  Hepatitis C: Get tested at least once in your life.  STIs (sexually transmitted infections)  Before age 24: Ask your care team if you should be screened for STIs.  After age 24: Get screened for STIs if you're at risk. You are at risk for STIs (including HIV) if:  You are sexually active with more than one person.  You don't use condoms every time.  You or a partner was diagnosed with a  sexually transmitted infection.  If you are at risk for HIV, ask about PrEP medicine to prevent HIV.  Get tested for HIV at least once in your life, whether you are at risk for HIV or not.  Cancer screening tests  Cervical cancer screening: If you have a cervix, begin getting regular cervical cancer screening tests starting at age 21.  Breast cancer scan (mammogram): If you've ever had breasts, begin having regular mammograms starting at age 40. This is a scan to check for breast cancer.  Colon cancer screening: It is important to start screening for colon cancer at age 45.  Have a colonoscopy test every 10 years (or more often if you're at risk) Or, ask your provider about stool tests like a FIT test every year or Cologuard test every 3 years.  To learn more about your testing options, visit:   .  For help making a decision, visit:   https://bit.ly/jd78884.  Prostate cancer screening test: If you have a prostate, ask your care team if a prostate cancer screening test (PSA) at age 55 is right for you.  Lung cancer screening: If you are a current or former smoker ages 50 to 80, ask your care team if ongoing lung cancer screenings are right for you.  For informational purposes only. Not to replace the advice of your health care provider. Copyright   2023 [employer]. All rights reserved. Clinically reviewed by the [facility]. [employer] [id number] - REV 01/24.

## 2025-03-04 ENCOUNTER — PATIENT OUTREACH (OUTPATIENT)
Dept: CARE COORDINATION | Facility: CLINIC | Age: 50
End: 2025-03-04
Payer: COMMERCIAL

## 2025-03-12 ENCOUNTER — APPOINTMENT (OUTPATIENT)
Dept: INTERNAL MEDICINE | Age: 50
End: 2025-03-12

## 2025-03-17 NOTE — PATIENT INSTRUCTIONS
Preventive Health Recommendations  Female Ages 40 to 49    Yearly exam:     See your health care provider every year in order to  1. Review health changes.   2. Discuss preventive care.    3. Review your medicines if your doctor prescribed any.      Get a Pap test every three years (unless you have an abnormal result and your provider advises testing more often).      If you get Pap tests with HPV test, you only need to test every 5 years, unless you have an abnormal result. You do not need a Pap test if your uterus was removed (hysterectomy) and you have not had cancer.      You should be tested each year for STDs (sexually transmitted diseases), if you're at risk.       Ask your doctor if you should have a mammogram.      Have a colonoscopy (test for colon cancer) if someone in your family has had colon cancer or polyps before age 50.       Have a cholesterol test every 5 years.       Have a diabetes test (fasting glucose) after age 45. If you are at risk for diabetes, you should have this test every 3 years.    Shots: Get a flu shot each year. Get a tetanus shot every 10 years.     Nutrition:     Eat at least 5 servings of fruits and vegetables each day.    Eat whole-grain bread, whole-wheat pasta and brown rice instead of white grains and rice.    Talk to your provider about Calcium and Vitamin D.     Lifestyle    Exercise at least 150 minutes a week (an average of 30 minutes a day, 5 days a week). This will help you control your weight and prevent disease.    Limit alcohol to one drink per day.    No smoking.     Wear sunscreen to prevent skin cancer.    See your dentist every six months for an exam and cleaning.  
Home

## 2025-04-07 ENCOUNTER — MYC MEDICAL ADVICE (OUTPATIENT)
Dept: PEDIATRICS | Facility: CLINIC | Age: 50
End: 2025-04-07
Payer: COMMERCIAL

## 2025-04-07 DIAGNOSIS — N95.1 MENOPAUSAL SYNDROME (HOT FLASHES): ICD-10-CM

## 2025-04-07 DIAGNOSIS — Z79.890 HORMONE REPLACEMENT THERAPY (HRT): ICD-10-CM

## 2025-04-08 NOTE — TELEPHONE ENCOUNTER
"See patient's MyChart message   - Patient states that she was under the impression that her progesterone medication would be changed from 12 days a month to taking daily as her menstrual periods have become irregular     Upon chart review:   - 3/3/2025 office visit with Dr. Bang states:     \"# Menopause   - estradiol 0.05 bi weekly patch  - progesterone 100 mg daily  - hot flashes are pretty good  - just this month period is late  - more frequent migraines, hair loss, waking up in the middle of the night\"     progesterone (PROMETRIUM) 100 MG capsule 90 capsule 3 3/3/2025 -- No   Sig: Take 100 mg for 12 days, starting on day 14 of your cycle (period starts day 1).     - Pended progesterone (PROMETRIUM) 100 MG capsule daily with the [facility] - [id number]. Bang, please review and order with the updated/increased frequency as appropriate.     Paula LION RN   [facility]   "

## 2025-04-09 RX ORDER — PROGESTERONE 100 MG/1
100 CAPSULE ORAL DAILY
Qty: 90 CAPSULE | Refills: 3 | Status: SHIPPED | OUTPATIENT
Start: 2025-04-09

## 2025-04-14 ENCOUNTER — HOSPITAL ENCOUNTER (OUTPATIENT)
Dept: MAMMOGRAPHY | Facility: CLINIC | Age: 50
Discharge: HOME | End: 2025-04-14
Attending: INTERNAL MEDICINE | Admitting: INTERNAL MEDICINE
Payer: COMMERCIAL

## 2025-04-14 DIAGNOSIS — Z12.31 ENCOUNTER FOR SCREENING MAMMOGRAM FOR BREAST CANCER: ICD-10-CM

## 2025-04-14 PROCEDURE — 77063 BREAST TOMOSYNTHESIS BI: CPT

## 2025-04-14 PROCEDURE — 77067 SCR MAMMO BI INCL CAD: CPT

## 2025-05-28 ENCOUNTER — OFFICE VISIT (OUTPATIENT)
Dept: AUDIOLOGY | Facility: CLINIC | Age: 50
End: 2025-05-28
Payer: COMMERCIAL

## 2025-05-28 DIAGNOSIS — H91.90 HEARING DIFFICULTY, UNSPECIFIED LATERALITY: Primary | ICD-10-CM

## 2025-05-28 PROCEDURE — 92557 COMPREHENSIVE HEARING TEST: CPT | Performed by: AUDIOLOGIST

## 2025-05-28 PROCEDURE — 92550 TYMPANOMETRY & REFLEX THRESH: CPT | Performed by: AUDIOLOGIST

## 2025-05-28 NOTE — PROGRESS NOTES
AUDIOLOGY REPORT    SUBJECTIVE:  Kaleigh Nam is a 50 year old female who was seen in the Audiology Clinic at the [facility] for audiologic evaluation, self-referred. The patient reported that her spouse is concerned for her ability to hear well at home. She has a family history of hearing loss as her father/grandparents aged. 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 She denied tinnitus, dizziness/vertigo, otalgia, otorrhea, recent illness, and aural fullness. She endorsed itchy ears and environmental allergies, as well as migraine with an occasional vertiginous component.     OBJECTIVE:  Falls Risk Screening  Falls Risk Completed by: Audiology  Have you fallen 2 or more times in the past year? No  Have you fallen and had an injury in the past year? No     Otoscopic exam indicates ears are clear of cerumen, bilaterally.     Pure Tone Thresholds assessed using conventional audiometry with good  reliability from 250-8000 Hz bilaterally using insert earphones and circumaural headphones.     RIGHT:  normal hearing sensitivity    LEFT:   normal hearing sensitivity    Tympanogram:    RIGHT: normal eardrum mobility    LEFT:   normal eardrum mobility    Reflexes for 1000 Hz (reported by stimulus ear):  RIGHT: Ipsilateral is present at normal levels  RIGHT: Contralateral is present at normal levels  LEFT:   Ipsilateral is present at normal levels  LEFT:   Contralateral is present at normal levels      Speech Reception Threshold:    RIGHT: 5 dB HL    LEFT:   5 dB HL  Word Recognition Score:     RIGHT: 100% at 50 dB HL using NU-6 recorded word list.    LEFT:   100% at 50 dB HL using NU-6 recorded word list.      ASSESSMENT:     ICD-10-CM    1. Hearing difficulty, unspecified laterality  H91.90           Today s results were discussed with the patient in detail. Appropriate communication strategies were discussed at length, as were reasonable expectations regarding hearing  at a distance, in noisy environments, or when attention is diverted elsewhere.    PLAN:  Ms. Nam should return for hearing evaluation per medical management or patient concern. Wear hearing protection consistently in noise to preserve residual hearing sensitivity and to minimize the effects of noise on tinnitus. Ms. Nam expressed verbal understanding of this information and plan. Please call this clinic with questions regarding these results or recommendations.        Samira Mendoza, [facility]  [place] Licensed Audiologist [id number]

## 2025-07-03 DIAGNOSIS — Z79.890 HORMONE REPLACEMENT THERAPY (HRT): ICD-10-CM

## 2025-07-03 DIAGNOSIS — N95.1 MENOPAUSAL SYNDROME (HOT FLASHES): ICD-10-CM

## 2025-07-07 RX ORDER — ESTRADIOL 0.05 MG/D
1 PATCH, EXTENDED RELEASE TRANSDERMAL
Qty: 24 PATCH | Refills: 4 | OUTPATIENT
Start: 2025-07-07

## 2025-07-31 ENCOUNTER — TELEPHONE (OUTPATIENT)
Dept: PEDIATRICS | Facility: CLINIC | Age: 50
End: 2025-07-31
Payer: COMMERCIAL

## 2025-07-31 NOTE — TELEPHONE ENCOUNTER
Form completed in outbasket. I don't think I complete the last page bc not requesting any work restrictions. Pt needs to sign page 1.    SUREKHA Bang MD  Internal Medicine-Pediatrics